# Patient Record
Sex: MALE | Race: WHITE | NOT HISPANIC OR LATINO | Employment: FULL TIME | URBAN - METROPOLITAN AREA
[De-identification: names, ages, dates, MRNs, and addresses within clinical notes are randomized per-mention and may not be internally consistent; named-entity substitution may affect disease eponyms.]

---

## 2023-03-12 DIAGNOSIS — E11.9 TYPE 2 DIABETES MELLITUS WITHOUT COMPLICATION, UNSPECIFIED WHETHER LONG TERM INSULIN USE (MULTI): ICD-10-CM

## 2023-03-12 RX ORDER — GLIMEPIRIDE 4 MG/1
1 TABLET ORAL DAILY
COMMUNITY
Start: 2020-02-27 | End: 2023-03-12 | Stop reason: SDUPTHER

## 2023-03-12 RX ORDER — GLIMEPIRIDE 4 MG/1
4 TABLET ORAL DAILY
Qty: 90 TABLET | Refills: 1 | Status: SHIPPED | OUTPATIENT
Start: 2023-03-12 | End: 2023-06-12 | Stop reason: ALTCHOICE

## 2023-04-23 DIAGNOSIS — I10 HYPERTENSION, UNSPECIFIED TYPE: ICD-10-CM

## 2023-04-23 DIAGNOSIS — Z00.00 HEALTH CARE MAINTENANCE: ICD-10-CM

## 2023-04-23 RX ORDER — MELOXICAM 15 MG/1
15 TABLET ORAL DAILY
COMMUNITY
End: 2023-04-23 | Stop reason: SDUPTHER

## 2023-04-23 RX ORDER — AMLODIPINE BESYLATE 5 MG/1
5 TABLET ORAL DAILY
Qty: 90 TABLET | Refills: 1 | Status: SHIPPED | OUTPATIENT
Start: 2023-04-23 | End: 2023-10-18 | Stop reason: SDUPTHER

## 2023-04-23 RX ORDER — LISINOPRIL AND HYDROCHLOROTHIAZIDE 12.5; 2 MG/1; MG/1
1 TABLET ORAL 2 TIMES DAILY
COMMUNITY
End: 2023-04-23 | Stop reason: SDUPTHER

## 2023-04-23 RX ORDER — AMLODIPINE BESYLATE 5 MG/1
5 TABLET ORAL DAILY
COMMUNITY
End: 2023-04-23 | Stop reason: SDUPTHER

## 2023-04-23 RX ORDER — LISINOPRIL AND HYDROCHLOROTHIAZIDE 12.5; 2 MG/1; MG/1
2 TABLET ORAL DAILY
Qty: 180 TABLET | Refills: 1 | Status: SHIPPED | OUTPATIENT
Start: 2023-04-23 | End: 2023-10-25 | Stop reason: SDUPTHER

## 2023-04-23 RX ORDER — MELOXICAM 15 MG/1
15 TABLET ORAL DAILY
Qty: 90 TABLET | Refills: 1 | Status: SHIPPED | OUTPATIENT
Start: 2023-04-23 | End: 2023-10-18 | Stop reason: SDUPTHER

## 2023-06-01 PROBLEM — I10 BENIGN SYSTOLIC HYPERTENSION: Status: ACTIVE | Noted: 2023-06-01

## 2023-06-01 PROBLEM — M16.12 ARTHRITIS OF LEFT HIP: Status: ACTIVE | Noted: 2023-06-01

## 2023-06-01 PROBLEM — E11.21 DIABETES MELLITUS WITH KIDNEY DISEASE (MULTI): Status: ACTIVE | Noted: 2023-06-01

## 2023-06-01 PROBLEM — N18.31 STAGE 3A CHRONIC KIDNEY DISEASE (MULTI): Status: ACTIVE | Noted: 2023-06-01

## 2023-06-01 PROBLEM — H35.30 MACULAR DEGENERATION: Status: ACTIVE | Noted: 2023-06-01

## 2023-06-01 PROBLEM — E78.5 HYPERLIPEMIA: Status: ACTIVE | Noted: 2023-06-01

## 2023-06-01 RX ORDER — KRILL/OM-3/DHA/EPA/PHOSPHO/AST 1000-170MG
CAPSULE ORAL
COMMUNITY
Start: 2020-02-27

## 2023-06-01 RX ORDER — ASPIRIN 81 MG/1
TABLET ORAL
COMMUNITY
Start: 2020-02-27

## 2023-06-01 RX ORDER — ROSUVASTATIN CALCIUM 40 MG/1
TABLET, COATED ORAL
COMMUNITY
End: 2023-08-07 | Stop reason: SDUPTHER

## 2023-06-01 RX ORDER — SITAGLIPTIN AND METFORMIN HYDROCHLORIDE 1000; 50 MG/1; MG/1
TABLET, FILM COATED ORAL
COMMUNITY
End: 2023-06-24 | Stop reason: SDUPTHER

## 2023-06-01 RX ORDER — BLOOD SUGAR DIAGNOSTIC
STRIP MISCELLANEOUS
COMMUNITY
Start: 2020-02-27

## 2023-06-01 RX ORDER — AMMONIUM LACTATE 12 G/100G
LOTION TOPICAL
COMMUNITY
Start: 2020-02-27 | End: 2024-02-18 | Stop reason: SDUPTHER

## 2023-06-01 RX ORDER — CHOLECALCIFEROL (VITAMIN D3) 25 MCG
TABLET ORAL
COMMUNITY
Start: 2020-02-27

## 2023-06-12 ENCOUNTER — OFFICE VISIT (OUTPATIENT)
Dept: PRIMARY CARE | Facility: CLINIC | Age: 73
End: 2023-06-12
Payer: MEDICARE

## 2023-06-12 VITALS
DIASTOLIC BLOOD PRESSURE: 75 MMHG | BODY MASS INDEX: 35.79 KG/M2 | SYSTOLIC BLOOD PRESSURE: 132 MMHG | WEIGHT: 228 LBS | HEART RATE: 58 BPM | HEIGHT: 67 IN

## 2023-06-12 DIAGNOSIS — E11.21 DIABETES MELLITUS WITH KIDNEY DISEASE (MULTI): ICD-10-CM

## 2023-06-12 DIAGNOSIS — G47.33 SLEEP APNEA, OBSTRUCTIVE: ICD-10-CM

## 2023-06-12 DIAGNOSIS — M25.512 ACUTE PAIN OF LEFT SHOULDER: ICD-10-CM

## 2023-06-12 DIAGNOSIS — N18.31 STAGE 3A CHRONIC KIDNEY DISEASE (MULTI): ICD-10-CM

## 2023-06-12 DIAGNOSIS — Z13.89 SCREENING FOR MULTIPLE CONDITIONS: ICD-10-CM

## 2023-06-12 DIAGNOSIS — Z00.00 ROUTINE GENERAL MEDICAL EXAMINATION AT HEALTH CARE FACILITY: Primary | ICD-10-CM

## 2023-06-12 DIAGNOSIS — Z12.5 PROSTATE CANCER SCREENING: ICD-10-CM

## 2023-06-12 DIAGNOSIS — E66.01 OBESITY, MORBID (MULTI): ICD-10-CM

## 2023-06-12 DIAGNOSIS — Z13.6 SCREENING FOR CARDIOVASCULAR CONDITION: ICD-10-CM

## 2023-06-12 DIAGNOSIS — I10 BENIGN SYSTOLIC HYPERTENSION: ICD-10-CM

## 2023-06-12 DIAGNOSIS — Z13.220 SCREENING FOR HYPERLIPIDEMIA: ICD-10-CM

## 2023-06-12 DIAGNOSIS — E11.9 TYPE 2 DIABETES MELLITUS WITHOUT COMPLICATION, UNSPECIFIED WHETHER LONG TERM INSULIN USE (MULTI): ICD-10-CM

## 2023-06-12 PROBLEM — E78.5 HYPERLIPIDEMIA WITH TARGET LDL LESS THAN 100: Status: RESOLVED | Noted: 2023-06-12 | Resolved: 2023-06-12

## 2023-06-12 PROBLEM — H35.3190 NONEXUDATIVE MACULAR DEGENERATION: Status: ACTIVE | Noted: 2017-03-10

## 2023-06-12 LAB
ALANINE AMINOTRANSFERASE (SGPT) (U/L) IN SER/PLAS: 33 U/L (ref 10–52)
ALBUMIN (G/DL) IN SER/PLAS: 4.3 G/DL (ref 3.4–5)
ALKALINE PHOSPHATASE (U/L) IN SER/PLAS: 66 U/L (ref 33–136)
ANION GAP IN SER/PLAS: 14 MMOL/L (ref 10–20)
ASPARTATE AMINOTRANSFERASE (SGOT) (U/L) IN SER/PLAS: 24 U/L (ref 9–39)
BILIRUBIN TOTAL (MG/DL) IN SER/PLAS: 0.6 MG/DL (ref 0–1.2)
CALCIUM (MG/DL) IN SER/PLAS: 10.5 MG/DL (ref 8.6–10.6)
CARBON DIOXIDE, TOTAL (MMOL/L) IN SER/PLAS: 27 MMOL/L (ref 21–32)
CHLORIDE (MMOL/L) IN SER/PLAS: 103 MMOL/L (ref 98–107)
CHOLESTEROL (MG/DL) IN SER/PLAS: 118 MG/DL (ref 0–199)
CHOLESTEROL IN HDL (MG/DL) IN SER/PLAS: 32.5 MG/DL
CHOLESTEROL/HDL RATIO: 3.6
CREATININE (MG/DL) IN SER/PLAS: 1.41 MG/DL (ref 0.5–1.3)
ERYTHROCYTE DISTRIBUTION WIDTH (RATIO) BY AUTOMATED COUNT: 13.2 % (ref 11.5–14.5)
ERYTHROCYTE MEAN CORPUSCULAR HEMOGLOBIN CONCENTRATION (G/DL) BY AUTOMATED: 31.4 G/DL (ref 32–36)
ERYTHROCYTE MEAN CORPUSCULAR VOLUME (FL) BY AUTOMATED COUNT: 94 FL (ref 80–100)
ERYTHROCYTES (10*6/UL) IN BLOOD BY AUTOMATED COUNT: 4.66 X10E12/L (ref 4.5–5.9)
GFR MALE: 53 ML/MIN/1.73M2
GLUCOSE (MG/DL) IN SER/PLAS: 60 MG/DL (ref 74–99)
HEMATOCRIT (%) IN BLOOD BY AUTOMATED COUNT: 43.6 % (ref 41–52)
HEMOGLOBIN (G/DL) IN BLOOD: 13.7 G/DL (ref 13.5–17.5)
LDL: 45 MG/DL (ref 0–99)
LEUKOCYTES (10*3/UL) IN BLOOD BY AUTOMATED COUNT: 5.8 X10E9/L (ref 4.4–11.3)
NON HDL CHOLESTEROL: 86 MG/DL
NRBC (PER 100 WBCS) BY AUTOMATED COUNT: 0 /100 WBC (ref 0–0)
PLATELETS (10*3/UL) IN BLOOD AUTOMATED COUNT: 143 X10E9/L (ref 150–450)
POC HEMOGLOBIN A1C: 6 % (ref 4.2–6.5)
POTASSIUM (MMOL/L) IN SER/PLAS: 4.1 MMOL/L (ref 3.5–5.3)
PROSTATE SPECIFIC ANTIGEN,SCREEN: 1.95 NG/ML (ref 0–4)
PROTEIN TOTAL: 6.8 G/DL (ref 6.4–8.2)
SODIUM (MMOL/L) IN SER/PLAS: 140 MMOL/L (ref 136–145)
THYROTROPIN (MIU/L) IN SER/PLAS BY DETECTION LIMIT <= 0.05 MIU/L: 3.28 MIU/L (ref 0.44–3.98)
TRIGLYCERIDE (MG/DL) IN SER/PLAS: 205 MG/DL (ref 0–149)
UREA NITROGEN (MG/DL) IN SER/PLAS: 26 MG/DL (ref 6–23)
VLDL: 41 MG/DL (ref 0–40)

## 2023-06-12 PROCEDURE — 1159F MED LIST DOCD IN RCRD: CPT | Performed by: FAMILY MEDICINE

## 2023-06-12 PROCEDURE — 80053 COMPREHEN METABOLIC PANEL: CPT

## 2023-06-12 PROCEDURE — G0439 PPPS, SUBSEQ VISIT: HCPCS | Performed by: FAMILY MEDICINE

## 2023-06-12 PROCEDURE — 1160F RVW MEDS BY RX/DR IN RCRD: CPT | Performed by: FAMILY MEDICINE

## 2023-06-12 PROCEDURE — 84443 ASSAY THYROID STIM HORMONE: CPT

## 2023-06-12 PROCEDURE — 80061 LIPID PANEL: CPT

## 2023-06-12 PROCEDURE — 1036F TOBACCO NON-USER: CPT | Performed by: FAMILY MEDICINE

## 2023-06-12 PROCEDURE — 99214 OFFICE O/P EST MOD 30 MIN: CPT | Performed by: FAMILY MEDICINE

## 2023-06-12 PROCEDURE — 83036 HEMOGLOBIN GLYCOSYLATED A1C: CPT | Performed by: FAMILY MEDICINE

## 2023-06-12 PROCEDURE — 85027 COMPLETE CBC AUTOMATED: CPT

## 2023-06-12 PROCEDURE — 1170F FXNL STATUS ASSESSED: CPT | Performed by: FAMILY MEDICINE

## 2023-06-12 PROCEDURE — 3075F SYST BP GE 130 - 139MM HG: CPT | Performed by: FAMILY MEDICINE

## 2023-06-12 PROCEDURE — G0447 BEHAVIOR COUNSEL OBESITY 15M: HCPCS | Performed by: FAMILY MEDICINE

## 2023-06-12 PROCEDURE — 3078F DIAST BP <80 MM HG: CPT | Performed by: FAMILY MEDICINE

## 2023-06-12 PROCEDURE — G0103 PSA SCREENING: HCPCS

## 2023-06-12 RX ORDER — GLIMEPIRIDE 4 MG/1
4 TABLET ORAL DAILY
Qty: 90 TABLET | Refills: 1 | Status: SHIPPED | OUTPATIENT
Start: 2023-06-12 | End: 2023-12-08 | Stop reason: SDUPTHER

## 2023-06-12 ASSESSMENT — ACTIVITIES OF DAILY LIVING (ADL)
BATHING: INDEPENDENT
MANAGING_FINANCES: INDEPENDENT
GROCERY_SHOPPING: INDEPENDENT
TAKING_MEDICATION: INDEPENDENT
DRESSING: INDEPENDENT
DOING_HOUSEWORK: INDEPENDENT

## 2023-06-12 ASSESSMENT — PATIENT HEALTH QUESTIONNAIRE - PHQ9
SUM OF ALL RESPONSES TO PHQ9 QUESTIONS 1 AND 2: 0
1. LITTLE INTEREST OR PLEASURE IN DOING THINGS: NOT AT ALL
2. FEELING DOWN, DEPRESSED OR HOPELESS: NOT AT ALL

## 2023-06-12 ASSESSMENT — ENCOUNTER SYMPTOMS
DEPRESSION: 0
OCCASIONAL FEELINGS OF UNSTEADINESS: 0
LOSS OF SENSATION IN FEET: 0

## 2023-06-12 NOTE — PROGRESS NOTES
Assessment and Plan:  Problem List Items Addressed This Visit       Benign systolic hypertension    Current Assessment & Plan     Patient's blood pressure is at goal of 130/85 or less. Condition is stable. Continue current medications and treatment plan.  I recommend that you exercise for 30-45 minutes 5 days a week.  I also recommend a balanced diet with fruits and vegetables every day, lean meats, and little fried foods. The DASH diet (you can find this online) is a good example of this.           Relevant Orders    CBC (Completed)    Comprehensive Metabolic Panel (Completed)    Diabetes mellitus with kidney disease (CMS/[facility])    Current Assessment & Plan     A1c controlled continue medication         Relevant Orders    TSH with reflex to Free T4 if abnormal (Completed)    Lipid Panel (Completed)    Referral to Ophthalmology    POCT glycosylated hemoglobin (Hb A1C) manually resulted (Completed)    Stage 3a chronic kidney disease    Current Assessment & Plan     Monitor kidney function avoid NSAIDs avoid other nephrotoxic agents         Sleep apnea, obstructive    Current Assessment & Plan     Compliant with CPAP         Obesity, morbid (CMS/[facility])    Current Assessment & Plan     patient is advised to lose weight by reducing calorie intake and increasing activity levels, especially aerobic exercise           Other Visit Diagnoses       Routine general medical examination at health care facility    -  Primary    Acute pain of left shoulder        Relevant Orders    Referral to Orthopaedic Surgery    XR shoulder left 2+ views    CBC (Completed)    Comprehensive Metabolic Panel (Completed)    Prostate cancer screening        Relevant Orders    CBC (Completed)    Comprehensive Metabolic Panel (Completed)    Prostate Specific Antigen, Screen (Completed)    Screening for multiple conditions        Screening for cardiovascular condition        Screening for hyperlipidemia              Will refer to orthopedics    HPI left  "shoulder pain arthroscopy 10 yrs ago.  Unable to lift left arm no specific injury but has history of rotator cuff surgeries in the past        ROS   Constitutional:  o weight loss. Negative for chills and fever.   HENT: Negative.     Respiratory: Negative.     Cardiovascular: Negative.    Gastrointestinal: Negative.  Negative for nausea and vomiting.   Endocrine: Negative.    Genitourinary: Negative.    Musculoskeletal: Shoulder pain  Skin: Negative.  Negative for rash.   Allergic/Immunologic: Negative.    Neurological: Negative.    Hematological: Negative.    Psychiatric/Behavioral: Negative.     All other systems reviewed and are negative.      Blood Pressure 132/75   Pulse 58   Height 1.702 m (5' 7\")   Weight 103 kg (228 lb)   Body Mass Index 35.71 kg/m²   Body mass index is 35.71 kg/m².  Physical Exam    ENT:      Head: Normocephalic and atraumatic.      Right Ear: Tympanic membrane normal.      Left Ear: Tympanic membrane normal.      Nose: Nose normal.      Mouth/Throat:      Mouth: Mucous membranes are moist.   Eyes:      Pupils: Pupils are equal, round, and reactive to light.   Cardiovascular:      Rate and Rhythm: Normal rate and regular rhythm.      Pulses: Normal pulses.      Heart sounds: Normal heart sounds.   Pulmonary:      Effort: Pulmonary effort is normal.      Breath sounds: Normal breath sounds.   Abdominal:      General: Abdomen is flat. Bowel sounds are normal.      Palpations: Abdomen is soft.   Musculoskeletal:         Decreased abduction external rotation left shoulder to 30 degrees.      Cervical back: Normal range of motion and neck supple.   Skin:     General: Skin is warm and dry.      Capillary Refill: Capillary refill takes less than 2 seconds.   Neurological:      General: No focal deficit present.      Mental Status: She is alert and oriented to person, place, and time.   Psychiatric:         Mood and Affect: Mood normal.       Active Problem List  Patient Active Problem List "   Diagnosis    Arthritis of left hip    Benign systolic hypertension    Diabetes mellitus with kidney disease (CMS/[facility])    Hyperlipemia    Macular degeneration    Stage 3a chronic kidney disease    Nonexudative macular degeneration    Obesity, unspecified    Sleep apnea, obstructive    Obesity, morbid (CMS/[facility])       Comprehensive Medical/Surgical/Social/Family History  Past Medical History:   Diagnosis Date    Arthritis 12/01/2014    Diabetes mellitus (CMS/[facility]) 01/01/2009    Hypertension 06/12/2023    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     Past Surgical History:   Procedure Laterality Date    JOINT REPLACEMENT  6/2022    OTHER SURGICAL HISTORY  02/27/2020    Hip surgery    OTHER SURGICAL HISTORY  02/27/2020    Shoulder surgery    OTHER SURGICAL HISTORY  02/27/2020    Carpal tunnel surgery    TONSILLECTOMY  1957     Social History     Social History Narrative    Not on file       Allergies and Medications  Penicillins  Current Outpatient Medications   Medication Instructions    amLODIPine (NORVASC) 5 mg, oral, Daily    ammonium lactate (Lac-Hydrin) 12 % lotion APPLY  AND RUB  IN A THIN FILM TO AFFECTED AREAS TWICE DAILY.(AM AND PM).    aspirin 81 mg EC tablet TAKE 1 TABLET DAILY AS DIRECTED.    cholecalciferol (Vitamin D-3) 25 MCG (1000 UT) tablet TAKE 1 TABLET DAILY.    glimepiride (AMARYL) 4 mg, oral, Daily    Janumet 50-1,000 mg tablet take one tablet by mouth two times a day    krill-om-3-dha-epa-phospho-ast (krill oil) 1,929-657-82-80 mg capsule 3 tablets by mouth 2 times daily    lisinopriL-hydrochlorothiazide 20-12.5 mg tablet 2 tablets, oral, Daily    meloxicam (MOBIC) 15 mg, oral, Daily    OneTouch Ultra Test strip USE 1 STRIP Daily    rosuvastatin (Crestor) 40 mg tablet take one tablet by mouth at bedtime   Subjective   Reason for Visit: Jordon Waddell is an 72 y.o. male here for a Medicare Wellness visit.     Past Medical, Surgical, and Family History reviewed and updated in chart.    Reviewed all medications by  "prescribing practitioner or clinical pharmacist (such as prescriptions, OTCs, herbal therapies and supplements) and documented in the medical record.    HPI    Patient Care Team:  Tae Colby MD as PCP - General  Maggie Whipple MD as PCP - [employer] PCP     Review of Systems    Objective   Vitals:  Blood Pressure 132/75   Pulse 58   Height 1.702 m (5' 7\")   Weight 103 kg (228 lb)   Body Mass Index 35.71 kg/m²       Physical Exam    Assessment/Plan   Problem List Items Addressed This Visit       Benign systolic hypertension    Current Assessment & Plan     Patient's blood pressure is at goal of 130/85 or less. Condition is stable. Continue current medications and treatment plan.  I recommend that you exercise for 30-45 minutes 5 days a week.  I also recommend a balanced diet with fruits and vegetables every day, lean meats, and little fried foods. The DASH diet (you can find this online) is a good example of this.           Relevant Orders    CBC (Completed)    Comprehensive Metabolic Panel (Completed)    Diabetes mellitus with kidney disease (CMS/[facility])    Current Assessment & Plan     A1c controlled continue medication         Relevant Orders    TSH with reflex to Free T4 if abnormal (Completed)    Lipid Panel (Completed)    Referral to Ophthalmology    POCT glycosylated hemoglobin (Hb A1C) manually resulted (Completed)    Stage 3a chronic kidney disease    Current Assessment & Plan     Monitor kidney function avoid NSAIDs avoid other nephrotoxic agents         Sleep apnea, obstructive    Current Assessment & Plan     Compliant with CPAP         Obesity, morbid (CMS/[facility])    Current Assessment & Plan     patient is advised to lose weight by reducing calorie intake and increasing activity levels, especially aerobic exercise           Other Visit Diagnoses       Routine general medical examination at health care facility    -  Primary    Acute pain of left shoulder        Relevant Orders    " "Referral to Orthopaedic Surgery    XR shoulder left 2+ views    CBC (Completed)    Comprehensive Metabolic Panel (Completed)    Prostate cancer screening        Relevant Orders    CBC (Completed)    Comprehensive Metabolic Panel (Completed)    Prostate Specific Antigen, Screen (Completed)    Screening for multiple conditions        Screening for cardiovascular condition        Screening for hyperlipidemia             spent 15 minutes obtaining and discussing depression screening using PHQ-2 questions with results documented in chart.”  (If screen positive: \"The screen indicated potential depression and PHQ-9 was obtained with treatment and referral plan discussed  Watch what you eat and include more vegetables on your plate.  Portion control and exercise will help in loosing weight .   It is recommended to get 150 mins of brisk exercise in a week . 150 mins of exercise per week will help in maintaining your current weight, if you are already working out . Exercise should make your heart rate go up.   Calorie deficit ( spending more calories than eating ) will result in weight loss    A deficit of 500 calories per day in 7 days would results in 1lbs weight loss per week., Aim for 1-2 lbs weight loss per month.   You can reduce the calorie intake by 250 calories and spend 250 calories by working out which would give you a total deficit of 500 calories     No sugary/ sweetened beverages , portion control , dedicated physical activity atleast 5 times a week advised .          "

## 2023-06-13 ENCOUNTER — TELEPHONE (OUTPATIENT)
Dept: PRIMARY CARE | Facility: CLINIC | Age: 73
End: 2023-06-13
Payer: MEDICARE

## 2023-06-13 NOTE — TELEPHONE ENCOUNTER
----- Message from Tae Colby MD sent at 6/12/2023 11:46 PM EDT -----  Please call the patient regarding his abnormal result.  Kidney function slightly worse. Check if patient is taking advil or over the counter pain meds.   Check UA c and s. Repeat Renal function in 2 weeks.   Blood sugar low check BG at home 1-2 times day call if number below 70/

## 2023-06-18 PROBLEM — E66.01 OBESITY, MORBID (MULTI): Status: ACTIVE | Noted: 2023-06-18

## 2023-06-18 ASSESSMENT — COLUMBIA-SUICIDE SEVERITY RATING SCALE - C-SSRS
2. HAVE YOU ACTUALLY HAD ANY THOUGHTS OF KILLING YOURSELF?: NO
1. IN THE PAST MONTH, HAVE YOU WISHED YOU WERE DEAD OR WISHED YOU COULD GO TO SLEEP AND NOT WAKE UP?: NO

## 2023-06-18 NOTE — ASSESSMENT & PLAN NOTE
Patient's blood pressure is at goal of 130/85 or less. Condition is stable. Continue current medications and treatment plan.  I recommend that you exercise for 30-45 minutes 5 days a week.  I also recommend a balanced diet with fruits and vegetables every day, lean meats, and little fried foods. The DASH diet (you can find this online) is a good example of this.

## 2023-06-22 ENCOUNTER — TELEPHONE (OUTPATIENT)
Dept: PRIMARY CARE | Facility: CLINIC | Age: 73
End: 2023-06-22
Payer: MEDICARE

## 2023-06-24 DIAGNOSIS — E11.21 DIABETES MELLITUS WITH KIDNEY DISEASE (MULTI): ICD-10-CM

## 2023-06-25 RX ORDER — SITAGLIPTIN AND METFORMIN HYDROCHLORIDE 1000; 50 MG/1; MG/1
1 TABLET, FILM COATED ORAL 2 TIMES DAILY
Qty: 180 TABLET | Refills: 1 | Status: SHIPPED | OUTPATIENT
Start: 2023-06-25 | End: 2024-01-02 | Stop reason: SDUPTHER

## 2023-07-12 ENCOUNTER — CLINICAL SUPPORT (OUTPATIENT)
Dept: PRIMARY CARE | Facility: CLINIC | Age: 73
End: 2023-07-12
Payer: MEDICARE

## 2023-07-12 DIAGNOSIS — N28.9 ABNORMAL KIDNEY FUNCTION: ICD-10-CM

## 2023-07-12 LAB
ALBUMIN (G/DL) IN SER/PLAS: 4.3 G/DL (ref 3.4–5)
ANION GAP IN SER/PLAS: 16 MMOL/L (ref 10–20)
APPEARANCE, URINE: CLEAR
BACTERIA, URINE: ABNORMAL /HPF
BILIRUBIN, URINE: NEGATIVE
BLOOD, URINE: NEGATIVE
CALCIUM (MG/DL) IN SER/PLAS: 9.5 MG/DL (ref 8.6–10.6)
CARBON DIOXIDE, TOTAL (MMOL/L) IN SER/PLAS: 24 MMOL/L (ref 21–32)
CHLORIDE (MMOL/L) IN SER/PLAS: 105 MMOL/L (ref 98–107)
COLOR, URINE: ABNORMAL
CREATININE (MG/DL) IN SER/PLAS: 1.21 MG/DL (ref 0.5–1.3)
GFR MALE: 64 ML/MIN/1.73M2
GLUCOSE (MG/DL) IN SER/PLAS: 115 MG/DL (ref 74–99)
GLUCOSE, URINE: NEGATIVE MG/DL
KETONES, URINE: NEGATIVE MG/DL
LEUKOCYTE ESTERASE, URINE: NEGATIVE
NITRITE, URINE: NEGATIVE
PH, URINE: 5 (ref 5–8)
PHOSPHATE (MG/DL) IN SER/PLAS: 2.7 MG/DL (ref 2.5–4.9)
POTASSIUM (MMOL/L) IN SER/PLAS: 4 MMOL/L (ref 3.5–5.3)
PROTEIN, URINE: ABNORMAL MG/DL
RBC, URINE: 1 /HPF (ref 0–5)
SODIUM (MMOL/L) IN SER/PLAS: 141 MMOL/L (ref 136–145)
SPECIFIC GRAVITY, URINE: 1.01 (ref 1–1.03)
UREA NITROGEN (MG/DL) IN SER/PLAS: 23 MG/DL (ref 6–23)
UROBILINOGEN, URINE: <2 MG/DL (ref 0–1.9)
WBC, URINE: <1 /HPF (ref 0–5)

## 2023-07-12 PROCEDURE — 80069 RENAL FUNCTION PANEL: CPT

## 2023-07-12 PROCEDURE — 81001 URINALYSIS AUTO W/SCOPE: CPT

## 2023-07-18 ENCOUNTER — TELEPHONE (OUTPATIENT)
Dept: PRIMARY CARE | Facility: CLINIC | Age: 73
End: 2023-07-18
Payer: MEDICARE

## 2023-08-07 DIAGNOSIS — E78.5 HYPERLIPIDEMIA, UNSPECIFIED HYPERLIPIDEMIA TYPE: ICD-10-CM

## 2023-08-07 RX ORDER — ROSUVASTATIN CALCIUM 40 MG/1
40 TABLET, COATED ORAL NIGHTLY
Qty: 90 TABLET | Refills: 1 | Status: SHIPPED | OUTPATIENT
Start: 2023-08-07 | End: 2024-02-11 | Stop reason: SDUPTHER

## 2023-10-18 DIAGNOSIS — I10 HYPERTENSION, UNSPECIFIED TYPE: ICD-10-CM

## 2023-10-18 DIAGNOSIS — Z00.00 HEALTH CARE MAINTENANCE: ICD-10-CM

## 2023-10-18 RX ORDER — AMLODIPINE BESYLATE 5 MG/1
5 TABLET ORAL DAILY
Qty: 90 TABLET | Refills: 1 | Status: SHIPPED | OUTPATIENT
Start: 2023-10-18 | End: 2024-04-14 | Stop reason: SDUPTHER

## 2023-10-18 RX ORDER — MELOXICAM 15 MG/1
15 TABLET ORAL DAILY
Qty: 90 TABLET | Refills: 1 | Status: SHIPPED | OUTPATIENT
Start: 2023-10-18 | End: 2024-04-14 | Stop reason: SDUPTHER

## 2023-10-19 ENCOUNTER — APPOINTMENT (OUTPATIENT)
Dept: OPHTHALMOLOGY | Facility: CLINIC | Age: 73
End: 2023-10-19
Payer: MEDICARE

## 2023-10-25 DIAGNOSIS — I10 HYPERTENSION, UNSPECIFIED TYPE: ICD-10-CM

## 2023-10-25 RX ORDER — LISINOPRIL AND HYDROCHLOROTHIAZIDE 12.5; 2 MG/1; MG/1
2 TABLET ORAL DAILY
Qty: 180 TABLET | Refills: 1 | Status: SHIPPED | OUTPATIENT
Start: 2023-10-25 | End: 2024-04-14 | Stop reason: SDUPTHER

## 2023-11-01 ENCOUNTER — APPOINTMENT (OUTPATIENT)
Dept: OPHTHALMOLOGY | Facility: CLINIC | Age: 73
End: 2023-11-01
Payer: MEDICARE

## 2023-11-16 ENCOUNTER — OFFICE VISIT (OUTPATIENT)
Dept: ORTHOPEDIC SURGERY | Facility: HOSPITAL | Age: 73
End: 2023-11-16
Payer: MEDICARE

## 2023-11-16 DIAGNOSIS — M19.012 ARTHRITIS OF LEFT GLENOHUMERAL JOINT: Primary | ICD-10-CM

## 2023-11-16 PROCEDURE — 99214 OFFICE O/P EST MOD 30 MIN: CPT | Performed by: ORTHOPAEDIC SURGERY

## 2023-11-16 PROCEDURE — 1160F RVW MEDS BY RX/DR IN RCRD: CPT | Performed by: ORTHOPAEDIC SURGERY

## 2023-11-16 PROCEDURE — 1159F MED LIST DOCD IN RCRD: CPT | Performed by: ORTHOPAEDIC SURGERY

## 2023-11-16 PROCEDURE — 1126F AMNT PAIN NOTED NONE PRSNT: CPT | Performed by: ORTHOPAEDIC SURGERY

## 2023-11-16 PROCEDURE — 1036F TOBACCO NON-USER: CPT | Performed by: ORTHOPAEDIC SURGERY

## 2023-11-16 NOTE — PROGRESS NOTES
Subjective    Patient ID: Jordon Waddell is a 72 y.o. male.    Chief Complaint: No chief complaint on file.     Last Surgery: No surgery found  Last Surgery Date: No surgery found    Jordon is a 72 year-old right-hand-dominant male comes to see me for left shoulder pain for last 6 to 8 months. Does not remember an injury. 60% of normal shoulder. 100% on the right side. No therapy. 8 out of 10 pain at its worst 6 out of 10 on average. It wakes him up at night. No radiation of symptoms. Past medical history, surgical history, social history, family history were all reviewed and are as per the [facility] patient health history questionnaire form that I signed and scanned into the chart today. Review of systems for all 14 systems is negative except for the above noted findings in the history of present illness former smoker not currently smoking.     11/16/23  Jordon returns to the clinic today for a repeat follow up visit regarding his left shoulder pain. He is accompanied by his wife today.     He is here today with complaints of returned shoulder pain. His last injection only lasted him a few days and he would like to discuss his other options today.     He has had a knee replacement done with Dr. Suresh.         Objective   Patient is a well-developed well-nourished gentleman in no acute distress. breathes with normal chest rises. pupils are round and symmetric today. Awake alert and oriented x3. Examination of the right shoulder today reveals the skin to be intact. There is no sign any atrophy lesions or abrasions. There is no pain to palpation of the bony prominences. Cervical lymphadenopathy examined, and this was negative.     Patient had 5 out of 5 wrist flexors extension thumb extension bilaterally. Sensation was intact to light touch to median ulnar radial axillary and musculocutaneous nerves bilaterally. Positive radial pulse bilaterally. Provocative maneuvers on the right side today were negative.  Range of  motion of the right shoulder revealed 0-170° of forward elevation. 0-60° of external rotation. And internal rotation up to T 12.  Examination of the left shoulder today reveals skin is intact. 150 degrees not passively correctable 40 degrees external Tatian not passively correctable internally rotates to lower lumbar spine. Negative modified belly press test. Pain with external Tatian 90 degrees abduction 0 degrees of abduction. Some crepitus.       Image Results:  X-rays from June 2023 show moderate to severe osteoarthritis with slight medialization of the joint line     Assessment/Plan   Encounter Diagnoses:  No diagnosis found.  Patient patient with end-stage arthritis of the left shoulder    At this time we had a long discussion about the various options for shoulder arthritis.     1. Do nothing continue activity modifications   2. Consider cortisone injections into the glenohumeral joint. This would give pain relief that is temporary. this would not stop the progression of arthritis. For some patient's, this injection can last not at all, for 2 weeks, 4 weeks, 3 months or longer. The risk of the injection is fairly minimal but does include included a very small chance of infection.   3. Option is a total shoulder replacement. This will give the patient a more permanent solution to pain relief. It would also improve function. There is no rush to this procedure it is an elective procedure.    Jordon has severe arthritis in their shoulder. They have failed conservative management for over 6 months with injections, therapy and home exercises. They cannot continue living with their shoulders secondary to pain and disability. They have severe erosive, destructive changes within their shoulder joint limitation of motion that do not allow them to get above their shoulder level or reach behind their back. Because of the erosive and destructive changes in their shoulder joint as seen on CT scan and plain films, in  addition to their severe limitation of range of motion we have recommended an anatomic shoulder replacement which will help with theirpain as well as improve their function.     The risks of surgery are infection, dislocation, nerve injury, blood loss. The benefits are pain relief and restoration of function. The patient verbalized an understanding of the risks, benefits, alternatives, and the expected outcomes. We gave the patient a handout today on anatomic shoulder replacements.      As his last injection did not help for more than a few days, we discussed surgery more in depth today. We will schedule his pre operative CT scan to be sure we are not missing any underlying pathology and pre-surgical planning. We will also get him set up to see Sugey for pre-op paperwork.     PLAN for LEFT anatomic shoulder replacement pending on CT results.   No orders of the defined types were placed in this encounter.    Follow up will be scheduled appropriately.     Scribe Attestation  By signing my name below, Rita TOSCANO Scribe   attest that this documentation has been prepared under the direction and in the presence of Mike Salazar MD.

## 2023-11-30 ENCOUNTER — HOSPITAL ENCOUNTER (OUTPATIENT)
Dept: RADIOLOGY | Facility: HOSPITAL | Age: 73
Discharge: HOME | End: 2023-11-30
Payer: MEDICARE

## 2023-11-30 DIAGNOSIS — M19.012 ARTHRITIS OF LEFT GLENOHUMERAL JOINT: ICD-10-CM

## 2023-11-30 PROCEDURE — 73200 CT UPPER EXTREMITY W/O DYE: CPT | Mod: LT

## 2023-11-30 PROCEDURE — 73200 CT UPPER EXTREMITY W/O DYE: CPT | Mod: LEFT SIDE | Performed by: STUDENT IN AN ORGANIZED HEALTH CARE EDUCATION/TRAINING PROGRAM

## 2023-11-30 PROCEDURE — 76377 3D RENDER W/INTRP POSTPROCES: CPT | Mod: LEFT SIDE | Performed by: STUDENT IN AN ORGANIZED HEALTH CARE EDUCATION/TRAINING PROGRAM

## 2023-12-05 ENCOUNTER — OFFICE VISIT (OUTPATIENT)
Dept: ORTHOPEDIC SURGERY | Facility: HOSPITAL | Age: 73
End: 2023-12-05
Payer: MEDICARE

## 2023-12-05 VITALS — HEIGHT: 67 IN | WEIGHT: 220 LBS | BODY MASS INDEX: 34.53 KG/M2

## 2023-12-05 DIAGNOSIS — Z01.818 PRE-OP EVALUATION: ICD-10-CM

## 2023-12-05 DIAGNOSIS — M19.012 ARTHRITIS OF LEFT SHOULDER REGION: ICD-10-CM

## 2023-12-05 PROCEDURE — 1159F MED LIST DOCD IN RCRD: CPT | Performed by: NURSE PRACTITIONER

## 2023-12-05 PROCEDURE — 3044F HG A1C LEVEL LT 7.0%: CPT | Performed by: NURSE PRACTITIONER

## 2023-12-05 PROCEDURE — 3048F LDL-C <100 MG/DL: CPT | Performed by: NURSE PRACTITIONER

## 2023-12-05 PROCEDURE — L3670 SO ACRO/CLAV CAN WEB PRE OTS: HCPCS | Performed by: NURSE PRACTITIONER

## 2023-12-05 PROCEDURE — 1157F ADVNC CARE PLAN IN RCRD: CPT | Performed by: NURSE PRACTITIONER

## 2023-12-05 PROCEDURE — 99214 OFFICE O/P EST MOD 30 MIN: CPT | Performed by: NURSE PRACTITIONER

## 2023-12-05 PROCEDURE — 1036F TOBACCO NON-USER: CPT | Performed by: NURSE PRACTITIONER

## 2023-12-05 PROCEDURE — 1160F RVW MEDS BY RX/DR IN RCRD: CPT | Performed by: NURSE PRACTITIONER

## 2023-12-05 PROCEDURE — 1126F AMNT PAIN NOTED NONE PRSNT: CPT | Performed by: NURSE PRACTITIONER

## 2023-12-05 ASSESSMENT — PAIN - FUNCTIONAL ASSESSMENT: PAIN_FUNCTIONAL_ASSESSMENT: 0-10

## 2023-12-05 ASSESSMENT — PAIN DESCRIPTION - DESCRIPTORS: DESCRIPTORS: ACHING;SHARP

## 2023-12-05 ASSESSMENT — PAIN SCALES - GENERAL: PAINLEVEL_OUTOF10: 7

## 2023-12-05 NOTE — PROGRESS NOTES
Provider Impression/Assessment:  Jordon Waddell has end-stage arthritis of left shoulder.      PLAN for LEFT anatomic shoulder replacement pending on CT results.      Patient Discussion/Plan:  Patient has failed conservative management of injections, therapy and anti-inflammatories. The risks benefits and alternatives of an anatomic shoulder replacement were discussed at length with Dr Salazar previously. This was reviewed today. The risks of surgery are infection, dislocation, nerve injury, blood loss. The benefits are pain relief and restoration of function and activities of daily life. The patient verbalize an understanding of the risks benefits alternatives and the expected outcomes and wishes to proceed with elective surgery. The rehabilitation after surgery was discussed at length. It would be 4 weeks in a sling with activities as tolerated at the waist level. Following this they will get into physical therapy for stretching and range of motion exercises. We anticipate they will make a near full recovery around 3-4 months, but with continued improvement up to a year after surgery. We also discussed the hospital course. Usually patients stay one night but sometimes they are able to leave same day. We will plan on Jordon Waddell leaving same day.     We have scheduled them for left total anatomic shoulder replacement on 2/21/24 to take place at [facility]. They were fitted for the post-operative sling today. They will need to be seen and cleared by the anesthesia team prior to surgery. Patient has obtained a CT scan prior to surgery. No further imaging is needed.      Plan will be for left anatomic total shoulder replacement pending the outcomes of the CT scan. We gave the patient a handout today on anatomic shoulder replacement. Patient works at [employer] and will plan on 12 weeks off following surgery due to the nature of his position. Paperwork was submitted to our office today for further work up.      All of their questions were answered today to their satisfaction and they are in agreement with the above plan. They know how to reach the office if any additional questions arise prior to surgery date. .      Patient was discussed with Dr Salazar and imaging reviewed. He is in full agreement of the surgical plan.      Should you have any questions or concerns after your visit today, please do not hesitate to call the office at [phone]. I am honored to be a provider on your health care team and remain dedicated to helping you achieve your healthcare goals    -------------------------------------------------------------------------------------------------  CHIEF COMPLAINT:  PRE-OPERATIVE VISIT  LEFT SHOULDER    History of Present Illness:  Rafaela is a 72 year-old right-hand-dominant male comes to see me for left shoulder pain for last 6 to 8 months. Does not remember an injury. 60% of normal shoulder. 100% on the right side. No therapy. 8 out of 10 pain at its worst 6 out of 10 on average. It wakes him up at night. No radiation of symptoms. Past medical history, surgical history, social history, family history were all reviewed and are as per the [facility] patient health history questionnaire form that I signed and scanned into the chart today. Review of systems for all 14 systems is negative except for the above noted findings in the history of present illness former smoker not currently smoking.      11/16/23  Rafaela returns to the clinic today for a repeat follow up visit regarding his left shoulder pain. He is accompanied by his wife today. He is here today with complaints of returned shoulder pain. His last injection only lasted him a few days and he would like to discuss his other options today. He has had a knee replacement done with Dr. Suresh.     12/5/23  RAFAELA RIOS returns to clinic for a preoperative planning visit for a left total shoulder replacement. He denies any new injury or trauma to his  left shoulder since his last visit. He is still struggling to sleep at night due to his pain. Status post bilateral arthroscopies at [facility] 'years ago'. 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 Has Ly[employer] chair at home already for post operative sleeping.     PCN - rash  DM - last A1C was <6  BMI = 34    Review of Systems:   Review of systems for all 14 systems is negative for complaint except for the above noted findings in the history of present illness.    Family, social, and medical histories are obtained and reviewed.    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       Past Surgical History:   Procedure Laterality Date    JOINT REPLACEMENT  2022    OTHER SURGICAL HISTORY  2020    Hip surgery    OTHER SURGICAL HISTORY  2020    Shoulder surgery    OTHER SURGICAL HISTORY  2020    Carpal tunnel surgery    TONSILLECTOMY         Social History     Socioeconomic History    Marital status:      Spouse name: Not on file    Number of children: Not on file    Years of education: Not on file    Highest education level: Not on file   Occupational History    Not on file   Tobacco Use    Smoking status: Former     Packs/day: 1.00     Years: 5.00     Additional pack years: 0.00     Total pack years: 5.00     Types: Cigarettes     Start date: 1980     Quit date: 1985     Years since quittin.9    Smokeless tobacco: Never   Vaping Use    Vaping Use: Never used   Substance and Sexual Activity    Alcohol use: Yes     Alcohol/week: 1.0 standard drink of alcohol     Types: 1 Cans of beer per week     Drug use: Never    Sexual activity: Not Currently     Partners: Female   Other Topics Concern    Not on file   Social History Narrative    Not on file     Social Determinants of Health     Financial Resource Strain: Not on file   Food Insecurity: Not on file   Transportation Needs: Not on file   Physical Activity: Not on file   Stress: Not on file   Social Connections: Not on file   Intimate Partner Violence: Not on file   Housing Stability: Not on file     Diagnoses/Problems:  Left shoulder arthritis    Physical Examination:  Jordon Waddell is a well-developed well-nourished male in no acute distress. Breathes with normal chest rises. Eye exam reveals round pupils. Awake alert and oriented x3.     Examination of right shoulder reveals range of motion 0-170° of forward elevation. 0-60° of external rotation. Internal rotation was to T12.     Examination of left shoulder reveals skin is intact. No edema. No ecchymosis. No erythema. Active forward elevation to 90°, passively to 150°. External rotation to 10°. Internally rotates to lower lumbar spine. No pain over acromioclavicular joint. Neurovascular intact distally. Jobes test is 4+ out of 5.      Results/Imaging:  Independent review of the imaging today of left shows no signs of any fracture or dislocation. X-rays from June 2023 show moderate to severe osteoarthritis with slight medialization of the joint line. These were personally reviewed with Dr Salazar previously. I personally spent time viewing digital images of the radiology testing with the patient. I explained the results to the patient, along with suggested explanation for follow up recommendations based on testing and clinical results.     Medical Equipment:  Patient was prescribed a shoulder sling for postoperative care. The patient will have weakness, instability and/or deformity of their left arm which requires stabilization from this orthosis to improve their function after surgery.     Verbal and  written instructions for the use, wear schedule, cleaning and application of this item were given. Patient was instructed that should the brace result in increased pain, decreased sensation, increased swelling, or an overall worsening of their medical condition, to please contact our office immediately at [phone].     Orthotic management and training was provided for skin care, modifications due to healing tissues, edema changes, interruption in skin integrity, and safety precautions with the orthosis.        *While intending to generate a timely document that accurately reflects the content of the visit, no guarantee can be provided that every grammatical or spelling mistake has been or will be identified or corrected. Thank you for your understanding.

## 2023-12-08 DIAGNOSIS — E11.9 TYPE 2 DIABETES MELLITUS WITHOUT COMPLICATION, UNSPECIFIED WHETHER LONG TERM INSULIN USE (MULTI): ICD-10-CM

## 2023-12-08 RX ORDER — GLIMEPIRIDE 4 MG/1
4 TABLET ORAL DAILY
Qty: 90 TABLET | Refills: 1 | Status: SHIPPED | OUTPATIENT
Start: 2023-12-08 | End: 2024-06-03 | Stop reason: SDUPTHER

## 2023-12-18 ENCOUNTER — OFFICE VISIT (OUTPATIENT)
Dept: PRIMARY CARE | Facility: CLINIC | Age: 73
End: 2023-12-18
Payer: MEDICARE

## 2023-12-18 VITALS
SYSTOLIC BLOOD PRESSURE: 134 MMHG | BODY MASS INDEX: 34.84 KG/M2 | HEIGHT: 67 IN | HEART RATE: 71 BPM | WEIGHT: 222 LBS | DIASTOLIC BLOOD PRESSURE: 82 MMHG

## 2023-12-18 DIAGNOSIS — E78.00 PURE HYPERCHOLESTEROLEMIA: ICD-10-CM

## 2023-12-18 DIAGNOSIS — Z23 NEED FOR INFLUENZA VACCINATION: ICD-10-CM

## 2023-12-18 DIAGNOSIS — E11.21 DIABETES MELLITUS WITH KIDNEY DISEASE (MULTI): ICD-10-CM

## 2023-12-18 DIAGNOSIS — Z00.00 ROUTINE GENERAL MEDICAL EXAMINATION AT A HEALTH CARE FACILITY: Primary | ICD-10-CM

## 2023-12-18 DIAGNOSIS — I10 BENIGN SYSTOLIC HYPERTENSION: ICD-10-CM

## 2023-12-18 PROBLEM — S89.92XA LEFT KNEE INJURY: Status: RESOLVED | Noted: 2023-12-18 | Resolved: 2023-12-18

## 2023-12-18 PROBLEM — M19.90 CHRONIC OSTEOARTHRITIS: Status: ACTIVE | Noted: 2023-12-18

## 2023-12-18 PROBLEM — J22 ACUTE RESPIRATORY INFECTION: Status: RESOLVED | Noted: 2023-12-18 | Resolved: 2023-12-18

## 2023-12-18 PROBLEM — R50.9 FEVER: Status: RESOLVED | Noted: 2023-12-18 | Resolved: 2023-12-18

## 2023-12-18 PROBLEM — S76.112S: Status: RESOLVED | Noted: 2023-12-18 | Resolved: 2023-12-18

## 2023-12-18 LAB
ALBUMIN SERPL BCP-MCNC: 4.4 G/DL (ref 3.4–5)
ALP SERPL-CCNC: 67 U/L (ref 33–136)
ALT SERPL W P-5'-P-CCNC: 21 U/L (ref 10–52)
ANION GAP SERPL CALC-SCNC: 13 MMOL/L (ref 10–20)
AST SERPL W P-5'-P-CCNC: 19 U/L (ref 9–39)
BILIRUB SERPL-MCNC: 0.4 MG/DL (ref 0–1.2)
BUN SERPL-MCNC: 22 MG/DL (ref 6–23)
CALCIUM SERPL-MCNC: 10.1 MG/DL (ref 8.6–10.6)
CHLORIDE SERPL-SCNC: 105 MMOL/L (ref 98–107)
CHOLEST SERPL-MCNC: 116 MG/DL (ref 0–199)
CHOLESTEROL/HDL RATIO: 2.9
CO2 SERPL-SCNC: 26 MMOL/L (ref 21–32)
CREAT SERPL-MCNC: 1.18 MG/DL (ref 0.5–1.3)
GFR SERPL CREATININE-BSD FRML MDRD: 66 ML/MIN/1.73M*2
GLUCOSE SERPL-MCNC: 163 MG/DL (ref 74–99)
HDLC SERPL-MCNC: 40 MG/DL
LDLC SERPL CALC-MCNC: 56 MG/DL
NON HDL CHOLESTEROL: 76 MG/DL (ref 0–149)
POC HEMOGLOBIN A1C: 6 % (ref 4.2–6.5)
POTASSIUM SERPL-SCNC: 4.1 MMOL/L (ref 3.5–5.3)
PROT SERPL-MCNC: 6.6 G/DL (ref 6.4–8.2)
SODIUM SERPL-SCNC: 140 MMOL/L (ref 136–145)
TRIGL SERPL-MCNC: 100 MG/DL (ref 0–149)
VLDL: 20 MG/DL (ref 0–40)

## 2023-12-18 PROCEDURE — 1126F AMNT PAIN NOTED NONE PRSNT: CPT | Performed by: FAMILY MEDICINE

## 2023-12-18 PROCEDURE — 1036F TOBACCO NON-USER: CPT | Performed by: FAMILY MEDICINE

## 2023-12-18 PROCEDURE — 83036 HEMOGLOBIN GLYCOSYLATED A1C: CPT | Performed by: FAMILY MEDICINE

## 2023-12-18 PROCEDURE — 1159F MED LIST DOCD IN RCRD: CPT | Performed by: FAMILY MEDICINE

## 2023-12-18 PROCEDURE — G0008 ADMIN INFLUENZA VIRUS VAC: HCPCS | Performed by: FAMILY MEDICINE

## 2023-12-18 PROCEDURE — 99397 PER PM REEVAL EST PAT 65+ YR: CPT | Performed by: FAMILY MEDICINE

## 2023-12-18 PROCEDURE — 90686 IIV4 VACC NO PRSV 0.5 ML IM: CPT | Performed by: FAMILY MEDICINE

## 2023-12-18 PROCEDURE — 36415 COLL VENOUS BLD VENIPUNCTURE: CPT

## 2023-12-18 PROCEDURE — 99214 OFFICE O/P EST MOD 30 MIN: CPT | Performed by: FAMILY MEDICINE

## 2023-12-18 PROCEDURE — 3079F DIAST BP 80-89 MM HG: CPT | Performed by: FAMILY MEDICINE

## 2023-12-18 PROCEDURE — 3048F LDL-C <100 MG/DL: CPT | Performed by: FAMILY MEDICINE

## 2023-12-18 PROCEDURE — 3075F SYST BP GE 130 - 139MM HG: CPT | Performed by: FAMILY MEDICINE

## 2023-12-18 PROCEDURE — 80061 LIPID PANEL: CPT

## 2023-12-18 PROCEDURE — 80053 COMPREHEN METABOLIC PANEL: CPT

## 2023-12-18 PROCEDURE — 1160F RVW MEDS BY RX/DR IN RCRD: CPT | Performed by: FAMILY MEDICINE

## 2023-12-18 ASSESSMENT — PATIENT HEALTH QUESTIONNAIRE - PHQ9
SUM OF ALL RESPONSES TO PHQ9 QUESTIONS 1 AND 2: 0
2. FEELING DOWN, DEPRESSED OR HOPELESS: NOT AT ALL
1. LITTLE INTEREST OR PLEASURE IN DOING THINGS: NOT AT ALL

## 2023-12-18 ASSESSMENT — ENCOUNTER SYMPTOMS
DEPRESSION: 0
LOSS OF SENSATION IN FEET: 0
OCCASIONAL FEELINGS OF UNSTEADINESS: 0

## 2023-12-18 NOTE — PROGRESS NOTES
"  Subjective     Patient ID: Jordon Waddell is a 73 y.o. male who presents for Follow-up.      Last Physical :  1 Years ago     Pt's PMH, PSH, SH, FH , meds and allergies was obtained / reviewed and updated .     Dental visits : Y  Vision issues : N  Hearing issues : N    Immunizations : Y    Diet :  could be better  Exercise:  Weight concerns :     Alcohol: as noted in   Tobacco: as noted in   Recreational drug use : None/ as noted in     G:  Parity:  Full term:    Premature:   (s):   Living :  Ab induced:   Ab spontaneous :  Ectopic :   Multiple :    PAP smear :  Mammogram :  Colonoscopy:    Metabolic screening   - Lipid   - Glucose  ======================================================    Visit Vitals  Blood Pressure 134/82   Pulse 71   Height 1.702 m (5' 7\")   Weight 101 kg (222 lb)   Body Mass Index 34.77 kg/m²   Smoking Status Former   Body Surface Area 2.19 m²      No LMP for male patient.     =====================================    Review of systems:  Constitutional: no chills, no fever and no night sweats.     Eyes: no blurred vision and no eyesight problems.     ENT: no hearing loss, no nasal congestion, no nasal discharge, no hoarseness and no sore throat.     Cardiovascular: no chest pain, no intermittent leg claudication, no lower extremity edema, no palpitations and no syncope.     Respiratory: no cough, no shortness of breath during exertion, no shortness of breath at rest and no wheezing.     Gastrointestinal: no abdominal pain, no blood in stools, no constipation, no diarrhea, no melena, no nausea, no rectal pain and no vomiting.     Genitourinary: no dysuria, no change in urinary frequency, no urinary hesitancy, no feelings of urinary urgency and no vaginal discharge.       Neurological: no difficulty walking, no headache, no limb weakness, no numbness and no tingling.     Psychiatric: no anxiety, no depression, no anhedonia and no substance use disorders.     Endocrine: no recent " weight gain and no recent weight loss.     Hematologic/Lymphatic: no tendency for easy bruising and no swollen glands.   ============================================================    Physical exam :    Constitutional: Alert and in no acute distress. Well developed, well nourished.     Eyes: Normal external exam. Pupils were equal in size, round, reactive to light (PERRL) with normal accommodation and extraocular movements intact (EOMI).     Ears, Nose, Mouth, and Throat: External inspection of ears and nose: Normal.  Otoscopic examination: Normal.      Neck: No neck mass was observed. Supple.     Cardiovascular: Heart rate and rhythm were normal, normal S1 and S2, no gallops, no murmurs and no pericardial rub    Pulmonary: No respiratory distress. Clear bilateral breath sounds.     Abdomen: Soft nontender; no abdominal mass palpated. No organomegaly.     l.      Skin: Normal skin color and pigmentation, normal skin turgor, and no rash.     Neurologic: Deep tendon reflexes were 2+ and symmetric. Sensation: Normal.     Psychiatric: Judgment and insight: Intact. Mood and affect: Normal.    Lymphatic : Cervical/ axillary/ groin Lns Palpable/ non palpable            All other systems have been reviewed and are negative for complaint.      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

## 2023-12-26 PROBLEM — Z23 NEED FOR INFLUENZA VACCINATION: Status: ACTIVE | Noted: 2023-12-26

## 2023-12-26 PROBLEM — Z00.00 ROUTINE GENERAL MEDICAL EXAMINATION AT A HEALTH CARE FACILITY: Status: ACTIVE | Noted: 2023-12-26

## 2024-01-02 DIAGNOSIS — E11.21 DIABETES MELLITUS WITH KIDNEY DISEASE (MULTI): ICD-10-CM

## 2024-01-02 RX ORDER — SITAGLIPTIN AND METFORMIN HYDROCHLORIDE 1000; 50 MG/1; MG/1
1 TABLET, FILM COATED ORAL 2 TIMES DAILY
Qty: 180 TABLET | Refills: 1 | Status: SHIPPED | OUTPATIENT
Start: 2024-01-02 | End: 2024-01-03 | Stop reason: SDUPTHER

## 2024-01-03 DIAGNOSIS — E11.21 DIABETES MELLITUS WITH KIDNEY DISEASE (MULTI): ICD-10-CM

## 2024-01-03 RX ORDER — SITAGLIPTIN AND METFORMIN HYDROCHLORIDE 1000; 50 MG/1; MG/1
1 TABLET, FILM COATED ORAL 2 TIMES DAILY
Qty: 180 TABLET | Refills: 1 | Status: SHIPPED | OUTPATIENT
Start: 2024-01-03

## 2024-01-24 ENCOUNTER — TELEPHONE (OUTPATIENT)
Dept: PREADMISSION TESTING | Facility: HOSPITAL | Age: 74
End: 2024-01-24
Payer: MEDICARE

## 2024-01-25 NOTE — TELEPHONE ENCOUNTER
Patient:   DEONTE MEHTA            MRN: [id number]            FIN: [id number]              Age:   54 years     Sex:  FEMALE     :  67   Associated Diagnoses:   None   Author:   KAJAL PHILLIPS     Subjective   pt only needed tylenol opver nt for rt thrpatpain.  no fever,chill. toleraitmg liquid diet  without much increase in paain.     Health Status   Allergies:    Allergies (1) Active Reaction  NKA None Documented    Current medications: Medications (8) Active  Scheduled: (3)  ampicillin-sulbactam-NaCl 0.9%  1.5 gm 100 mL, IVPB, Q6H (variable)  Benzocaine 20% ORAL topical spray 0.5 mL UD  1 spray, Oral Mucosa, Once (scheduled)  Morphine PF 2 mg/1 mL inj SDV  2 mg 1 mL, IV Push, Q4H  Continuous: (0)  PRN: (5)  Acetaminophen 500 mg tab  1,000 mg 2 tab, Oral, Q6H  Hydrocodone-acetaminophen 5-325 mg tab  1 tab, Oral, Q6H  Ibuprofen 600 mg tab  600 mg 1 tab, Oral, Q6H  Melatonin 3 mg tab  3 mg 1 tab, Oral, Q Bedtime  Morphine PF 4 mg/1 mL inj SDV  4 mg 1 mL, IV Push, Q4H        Objective   Intake and Output   I & O between:  2020 09:22 TO 2020 09:22  Med Dosing Weight:  65.9  kg   2020  24 Hour Intake:   101.00  ( 1.53 mL/kg )  24 Hour Output:   0.00           24 Hour Urine/Stool Output:   0.0  24 Hour Balance:   101.00           24 Hour Urine Output:   0.00  ( 0.00 mL/kg/hr )     VS/Measurements     Vitals between:   2020 09:22:38   TO   2020 09:22:38                   LAST RESULT MINIMUM MAXIMUM  Temperature 37.1 36.4 37.1  Heart Rate 66 47 66  Respiratory Rate 12 12 16  NISBP           113 110 124  NIDBP           54 54 80  SpO2                    96 96 96    Lines and Tubes:    LINES  Peripheral Intravenous Antecubital Right   Gauge: 20   Charted: 20 07:45  Inserted: 20   Days Since Insertion: 1 days  Indication of Use: IVPB, Saline Lock   .    General:  Alert and oriented, No acute distress.    Neck:  No carotid bruit, No jugular venous  PAT SCHEDULED   distention, no supraclavicular, submandibular or cervical ln noted.  rt pharyngeal wall w exudate  teeth /gums overall ok.    Respiratory:  Lungs are clear to auscultation, Respirations are non-labored, Breath sounds are equal.   Cardiovascular:  Normal rate, Regular rhythm.    Gastrointestinal:  Soft, Non-tender, Normal bowel sounds.    Musculoskeletal     No tenderness.     No swelling.     Integumentary:  Warm, Dry.    Neurologic:  Alert, Oriented.      Results Review   General results   Interpretation:               Labs between:  16-FEB-2020 09:22 to 17-FEB-2020 09:22  CBC:                 WBC  HgB  Hct  Plt  MCV  RDW   17-FEB-2020 7.5  13.0  39.5  266  96.8  12.8   DIFF:                 Seg  Neutroph//ABS  Lymph//ABS  Mono//ABS  EOS/ABS  17-FEB-2020 NOT APPLICABLE  65 // 4.8 24 // 1.8 8 // 0.6 3 // 0.2  BMP:                 Na  Cl  BUN  Glu   17-FEB-2020 140  (H) 109  8  91                              K  CO2  Cr  Ca                              4.0  27  0.74  8.5                        Impression and Plan   final diagnosis and hospital course.  ---right lateral pharyngeal wall necortic ln  s/p I&D 5 cc straw colored fluid 2/16/20--cytology adn cx pend  r/o neoplasm vs infection  dw dr alanis. pt w hx tonsillectomy so not bascee, could be reacitve./necoritc node from a molar  pain conotrlled,afebrile,no leukocytosis. tolerating liquids so dc to home w po augmentin adn close fu dr alanis, dr kam and dentist  --smoker  \"quit 2 week ago\" when st started  1/2 ppd 40 years  pt had nightmares w chantix in past so refuses to try that again. nicoderm patches  caused hive  rec  niocotine lozenges  condition on dc stable  see med rec  fu mikaela haney, dr alanis,dentist 3-5 days

## 2024-02-02 ENCOUNTER — APPOINTMENT (OUTPATIENT)
Dept: PREADMISSION TESTING | Facility: HOSPITAL | Age: 74
End: 2024-02-02
Payer: MEDICARE

## 2024-02-09 ENCOUNTER — HOSPITAL ENCOUNTER (OUTPATIENT)
Dept: RADIOLOGY | Facility: CLINIC | Age: 74
Discharge: HOME | End: 2024-02-09
Payer: MEDICARE

## 2024-02-09 ENCOUNTER — LAB (OUTPATIENT)
Dept: LAB | Facility: LAB | Age: 74
End: 2024-02-09
Payer: MEDICARE

## 2024-02-09 ENCOUNTER — PRE-ADMISSION TESTING (OUTPATIENT)
Dept: PREADMISSION TESTING | Facility: HOSPITAL | Age: 74
End: 2024-02-09
Payer: MEDICARE

## 2024-02-09 VITALS
BODY MASS INDEX: 34.46 KG/M2 | HEART RATE: 76 BPM | SYSTOLIC BLOOD PRESSURE: 153 MMHG | HEIGHT: 67 IN | TEMPERATURE: 97.3 F | WEIGHT: 219.58 LBS | DIASTOLIC BLOOD PRESSURE: 69 MMHG | OXYGEN SATURATION: 98 % | RESPIRATION RATE: 18 BRPM

## 2024-02-09 DIAGNOSIS — E11.9 TYPE 2 DIABETES MELLITUS WITHOUT COMPLICATION, WITHOUT LONG-TERM CURRENT USE OF INSULIN (MULTI): ICD-10-CM

## 2024-02-09 DIAGNOSIS — E78.00 PURE HYPERCHOLESTEROLEMIA: ICD-10-CM

## 2024-02-09 DIAGNOSIS — Z01.818 PREOP EXAMINATION: ICD-10-CM

## 2024-02-09 DIAGNOSIS — Z01.818 PREOP EXAMINATION: Primary | ICD-10-CM

## 2024-02-09 DIAGNOSIS — E11.21 DIABETES MELLITUS WITH KIDNEY DISEASE (MULTI): ICD-10-CM

## 2024-02-09 DIAGNOSIS — I10 BENIGN SYSTOLIC HYPERTENSION: ICD-10-CM

## 2024-02-09 LAB
ABO GROUP (TYPE) IN BLOOD: NORMAL
ANTIBODY SCREEN: NORMAL
ATRIAL RATE: 84 BPM
BASOPHILS # BLD AUTO: 0.02 X10*3/UL (ref 0–0.1)
BASOPHILS NFR BLD AUTO: 0.2 %
EOSINOPHIL # BLD AUTO: 0.23 X10*3/UL (ref 0–0.4)
EOSINOPHIL NFR BLD AUTO: 2.3 %
ERYTHROCYTE [DISTWIDTH] IN BLOOD BY AUTOMATED COUNT: 13 % (ref 11.5–14.5)
EST. AVERAGE GLUCOSE BLD GHB EST-MCNC: 114 MG/DL
HBA1C MFR BLD: 5.6 %
HCT VFR BLD AUTO: 45.7 % (ref 41–52)
HGB BLD-MCNC: 15 G/DL (ref 13.5–17.5)
IMM GRANULOCYTES # BLD AUTO: 0.03 X10*3/UL (ref 0–0.5)
IMM GRANULOCYTES NFR BLD AUTO: 0.3 % (ref 0–0.9)
LYMPHOCYTES # BLD AUTO: 1.36 X10*3/UL (ref 0.8–3)
LYMPHOCYTES NFR BLD AUTO: 13.8 %
MCH RBC QN AUTO: 29.7 PG (ref 26–34)
MCHC RBC AUTO-ENTMCNC: 32.8 G/DL (ref 32–36)
MCV RBC AUTO: 91 FL (ref 80–100)
MONOCYTES # BLD AUTO: 0.91 X10*3/UL (ref 0.05–0.8)
MONOCYTES NFR BLD AUTO: 9.2 %
NEUTROPHILS # BLD AUTO: 7.32 X10*3/UL (ref 1.6–5.5)
NEUTROPHILS NFR BLD AUTO: 74.2 %
NRBC BLD-RTO: 0 /100 WBCS (ref 0–0)
P AXIS: 15 DEGREES
P OFFSET: 189 MS
P ONSET: 138 MS
PLATELET # BLD AUTO: 126 X10*3/UL (ref 150–450)
PR INTERVAL: 152 MS
Q ONSET: 214 MS
QRS COUNT: 14 BEATS
QRS DURATION: 82 MS
QT INTERVAL: 382 MS
QTC CALCULATION(BAZETT): 451 MS
QTC FREDERICIA: 427 MS
R AXIS: 3 DEGREES
RBC # BLD AUTO: 5.05 X10*6/UL (ref 4.5–5.9)
RH FACTOR (ANTIGEN D): NORMAL
T AXIS: 85 DEGREES
T OFFSET: 405 MS
VENTRICULAR RATE: 84 BPM
WBC # BLD AUTO: 9.9 X10*3/UL (ref 4.4–11.3)

## 2024-02-09 PROCEDURE — 99204 OFFICE O/P NEW MOD 45 MIN: CPT | Performed by: NURSE PRACTITIONER

## 2024-02-09 PROCEDURE — 86850 RBC ANTIBODY SCREEN: CPT

## 2024-02-09 PROCEDURE — 85025 COMPLETE CBC W/AUTO DIFF WBC: CPT

## 2024-02-09 PROCEDURE — 87081 CULTURE SCREEN ONLY: CPT | Mod: AHULAB | Performed by: NURSE PRACTITIONER

## 2024-02-09 PROCEDURE — 93005 ELECTROCARDIOGRAM TRACING: CPT | Performed by: NURSE PRACTITIONER

## 2024-02-09 PROCEDURE — 36415 COLL VENOUS BLD VENIPUNCTURE: CPT

## 2024-02-09 PROCEDURE — 86901 BLOOD TYPING SEROLOGIC RH(D): CPT

## 2024-02-09 PROCEDURE — 86900 BLOOD TYPING SEROLOGIC ABO: CPT

## 2024-02-09 PROCEDURE — 83036 HEMOGLOBIN GLYCOSYLATED A1C: CPT

## 2024-02-09 RX ORDER — CHLORHEXIDINE GLUCONATE ORAL RINSE 1.2 MG/ML
15 SOLUTION DENTAL 2 TIMES DAILY
Qty: 473 ML | Refills: 0 | Status: SHIPPED | OUTPATIENT
Start: 2024-02-09 | End: 2024-02-21 | Stop reason: HOSPADM

## 2024-02-09 ASSESSMENT — ENCOUNTER SYMPTOMS
NECK NEGATIVE: 1
RESPIRATORY NEGATIVE: 1
CONSTITUTIONAL NEGATIVE: 1
ENDOCRINE NEGATIVE: 1
GASTROINTESTINAL NEGATIVE: 1
NEUROLOGICAL NEGATIVE: 1
EYES NEGATIVE: 1

## 2024-02-09 NOTE — CPM/PAT H&P
[facility]/PAT Evaluation       Name: Jordon Waddell (Jordon Waddell)  /Age: 1950/73 y.o.     In-Person           HPI        Date of Consult: 24    Referring Provider: Dr. Salazar    Left shoulder arthroplasty TSR and RTSR, 24    Jordon Waddell is a 73  year-old male who presents to the [facility] for perioperative risk assessment prior to surgery.    Patient presents with a primary diagnosis of left shoulder pain. left shoulder pain for last 6 to 8 months. Does not remember an injury. 60% of normal shoulder. 100% on the right side. No therapy. 8 out of 10 pain at its worst 6 out of 10 on average. It wakes him up at night. No radiation of symptoms. Patient has failed conservative management of injections, therapy and anti-inflammatories.     This note was created in part upon personal review of patient's medical records.      Patient is scheduled to have Left shoulder arthroplasty TSR and RTSR      Pt denies any past history of anesthetic complications such as PONV, awareness, prolonged sedation, dental damage, aspiration, cardiac arrest, difficult intubation, difficult I.V. access or unexpected hospital admissions.  NO malignant hyperthermia and or pseudocholinesterase deficiency.  No history of blood transfusions     Stop bang 3      The patient is not a [facility] and will accept blood and blood products if medically indicated.   Type and screen sent.     Past Medical History:   Diagnosis Date    Acute respiratory infection 2023    Arthritis 2014    Arthritis of left hip     Arthritis of left shoulder region     Cataract 2021    Diabetes mellitus (CMS/[facility]) 2009 A1C 6.1    Hyperlipidemia     Hypertension 2023    Left knee injury 2023    Macular degeneration     Obesity     Quadriceps muscle rupture, left, sequela 2023    Sleep apnea, obstructive     Stage 3a chronic kidney disease (CKD) (CMS/[facility])     23 Cr 1.18 GFR 66    Visual impairment  6th grade       Past Surgical History:   Procedure Laterality Date    CARPAL TUNNEL RELEASE Bilateral 2000    CYST REMOVAL  1970    TONSILLECTOMY  89    TOTAL HIP ARTHROPLASTY Left        Social History     Socioeconomic History    Marital status:      Spouse name: Not on file    Number of children: Not on file    Years of education: Not on file    Highest education level: Not on file   Occupational History    Not on file   Tobacco Use    Smoking status: Former     Packs/day: 1.00     Years: 5.00     Additional pack years: 0.00     Total pack years: 5.00     Types: Cigarettes     Start date: 1980     Quit date: 1985     Years since quittin.1    Smokeless tobacco: Never   Vaping Use    Vaping Use: Never used   Substance and Sexual Activity    Alcohol use: Yes     Alcohol/week: 1.0 standard drink of alcohol     Types: 1 Cans of beer per week    Drug use: Never    Sexual activity: Defer     Partners: Female   Other Topics Concern    Not on file   Social History Narrative    Not on file     Social Determinants of Health     Financial Resource Strain: Not on file   Food Insecurity: Not on file   Transportation Needs: Not on file   Physical Activity: Not on file   Stress: Not on file   Social Connections: Not on file   Intimate Partner Violence: Not on file   Housing Stability: Not on file        Family History   Problem Relation Name Age of Onset    Cancer Mother Marcela     Stroke Mother Marcela     Hypertension Mother Marcela     Hypertension Father Baltazar     Diabetes Maternal Grandfather Roger clark        Allergies   Allergen Reactions    Penicillins Unknown     Childhood        Current Outpatient Medications:     amLODIPine (Norvasc) 5 mg tablet, Take 1 tablet (5 mg) by mouth once daily., Disp: 90 tablet, Rfl: 1    ammonium lactate (Lac-Hydrin) 12 % lotion, APPLY  AND RUB  IN A THIN FILM TO AFFECTED AREAS TWICE DAILY.(AM AND PM)., Disp: , Rfl:     aspirin 81 mg EC tablet, TAKE 1 TABLET DAILY AS  "DIRECTED., Disp: , Rfl:     cholecalciferol (Vitamin D-3) 25 MCG (1000 UT) tablet, TAKE 1 TABLET DAILY., Disp: , Rfl:     glimepiride (Amaryl) 4 mg tablet, Take 1 tablet (4 mg) by mouth once daily., Disp: 90 tablet, Rfl: 1    krill-om-3-dha-epa-phospho-ast (krill oil) 1,221-167-53-80 mg capsule, 3 tablets by mouth 2 times daily, Disp: , Rfl:     lisinopriL-hydrochlorothiazide 20-12.5 mg tablet, Take 2 tablets by mouth once daily. (Patient taking differently: Take 1 tablet by mouth 2 times a day.), Disp: 180 tablet, Rfl: 1    meloxicam (Mobic) 15 mg tablet, Take 1 tablet (15 mg) by mouth once daily., Disp: 90 tablet, Rfl: 1    rosuvastatin (Crestor) 40 mg tablet, Take 1 tablet (40 mg) by mouth once daily at bedtime., Disp: 90 tablet, Rfl: 1    SITagliptin phos-metformin (Janumet) 50-1,000 mg tablet, Take 1 tablet by mouth 2 times a day., Disp: 180 tablet, Rfl: 1    chlorhexidine (Peridex) 0.12 % solution, Use 15 mL in the mouth or throat 2 times a day. Use night before surgery and morning of surgery Swish and spit (Patient not taking: Reported on 2/9/2024), Disp: 473 mL, Rfl: 0    OneTouch Ultra Test strip, USE 1 STRIP Daily, Disp: , Rfl:         PAT ROS:   Constitutional:   neg    Neuro/Psych:   neg    Eyes:   neg    Ears:   neg    Nose:   neg    Mouth:   neg    Throat:   neg    Neck:   neg    Cardio:    Functional 4 mets. Denies sob walking up 2 flights of stairs  Respiratory:   neg    Endocrine:   neg    GI:   neg    :   neg    Musculoskeletal:    Left shoulder pain  Hematologic:   neg    Skin:  neg        Physical Exam  Vitals reviewed. Physical exam within normal limits.  (wears glasses)         PAT AIRWAY:   Airway:     Mallampati::  II    Neck ROM::  Full   Several missing teeth      Heart Rate:  [76]   Temp:  [36.3 °C (97.3 °F)]   Resp:  [18]   BP: (153)/(69)   Height:  [170 cm (5' 6.93\")]   Weight:  [99.6 kg (219 lb 9.3 oz)]   SpO2:  [98 %]      EKG in [facility] 2/9/24:  NSR  Cannot r/o anterior infarct, age " undetermined  Abnormal EKG  HR 84 bpm    Lab Results   Component Value Date    GLUCOSE 163 (H) 12/18/2023    CALCIUM 10.1 12/18/2023     12/18/2023    K 4.1 12/18/2023    CO2 26 12/18/2023     12/18/2023    BUN 22 12/18/2023    CREATININE 1.18 12/18/2023      Lab Results   Component Value Date    WBC 9.9 02/09/2024    HGB 15.0 02/09/2024    HCT 45.7 02/09/2024    MCV 91 02/09/2024     (L) 02/09/2024        Assessment and Plan:         Patient is a 73-year-old male scheduled for a with Dr. Salazar  on 2/21/24.  Patient has no active cardiac symptoms.   Patient denies any chest pain, tightness, heaviness, pressure, radiating pain, palpitations, irregular heartbeats, lightheadedness, cough, congestion, shortness of breath, WAY, PND, near syncope, weight loss or gain.     RCRI  1 , 6 % Risk of MACE    Cardiology:  -- Controlled HTN: EKG, CBC ordered      Hematology:  Patient instructed to ambulate as soon as possible postoperatively to decrease thromboembolic risk.   Initiate mechanical DVT prophylaxis as soon as possible and initiate chemical prophylaxis when deemed safe from a bleeding standpoint post surgery.   -- history of thrombocytopenia: Current platelet count of 126,000. No further action at this time    Aleidai: 5    Endocrinology:  -- NIDDM: hgba1c ordered    Renal:  -- Recommendations to avoid nephrotoxic drugs and carefully monitor fluid status to maintain euvolemia. Use dose adjusted medications as needed for the underlying level of renal function.      Risk assessment complete.  Patient is scheduled for a low surgical risk procedure.        Preoperative medication instructions were provided and reviewed with the patient.  Any additional testing or evaluation was explained to the patient.  Nothing by mouth instructions were discussed and patient's questions were answered prior to conclusion to this encounter.  Patient verbalized understanding of preoperative instructions given in  preadmission testing; discharge instructions available in EMR.    This note was dictated by a speech recognition.  Minor errors may have been detected in a speech recognition.

## 2024-02-09 NOTE — PREPROCEDURE INSTRUCTIONS
Medication List            Accurate as of February 9, 2024  8:59 AM. Always use your most recent med list.                amLODIPine 5 mg tablet  Commonly known as: Norvasc  Take 1 tablet (5 mg) by mouth once daily.  Medication Adjustments for Surgery: Take morning of surgery with sip of water, no other fluids     ammonium lactate 12 % lotion  Commonly known as: Lac-Hydrin  Medication Adjustments for Surgery: Continue until night before surgery     aspirin 81 mg EC tablet  Medication Adjustments for Surgery: Take morning of surgery with sip of water, no other fluids     chlorhexidine 0.12 % solution  Commonly known as: Peridex  Use 15 mL in the mouth or throat 2 times a day. Use night before surgery and morning of surgery Swish and spit  Medication Adjustments for Surgery: Take morning of surgery with sip of water, no other fluids     cholecalciferol 25 MCG (1000 UT) tablet  Commonly known as: Vitamin D-3  Medication Adjustments for Surgery: Continue until night before surgery     glimepiride 4 mg tablet  Commonly known as: Amaryl  Take 1 tablet (4 mg) by mouth once daily.  Medication Adjustments for Surgery: Continue until night before surgery     Janumet 50-1,000 mg tablet  Generic drug: SITagliptin phos-metformin  Take 1 tablet by mouth 2 times a day.  Medication Adjustments for Surgery: Continue until night before surgery     krill oil 1,943-275-72-80 mg capsule  Generic drug: krill-om-3-dha-epa-phospho-ast  Medication Adjustments for Surgery: Stop 7 days before surgery     lisinopriL-hydrochlorothiazide 20-12.5 mg tablet  Take 2 tablets by mouth once daily.  Medication Adjustments for Surgery: Continue until night before surgery     meloxicam 15 mg tablet  Commonly known as: Mobic  Take 1 tablet (15 mg) by mouth once daily.  Medication Adjustments for Surgery: Stop 7 days before surgery     OneTouch Ultra Test strip  Generic drug: blood sugar diagnostic  Medication Adjustments for Surgery: Take morning of  surgery with sip of water, no other fluids     rosuvastatin 40 mg tablet  Commonly known as: Crestor  Take 1 tablet (40 mg) by mouth once daily at bedtime.  Medication Adjustments for Surgery: Take morning of surgery with sip of water, no other fluids                      CONTACT SURGEON'S OFFICE IF YOU DEVELOP:  * Fever = 100.4 F   * New respiratory symptoms (e.g. cough, shortness of breath, respiratory distress, sore throat)  * Recent loss of taste or smell  *Flu like symptoms such as headache, fatigue or gastrointestinal symptoms  * You develop any open sores, shingles, burning or painful urination   AND/OR:  * You no longer wish to have the surgery.  * Any other personal circumstances change that may lead to the need to cancel or defer this surgery.  *You were admitted to any hospital within one week of your planned procedure.    SMOKING:  *Quitting smoking can make a huge difference to your health and recovery from surgery.    *If you need help with quitting, call 6-227-QUIT-NOW.    THE DAY BEFORE SURGERY:  *Do not eat any food after midnight the night before surgery.   *You are permitted to drink clear liquids (i.e. water, black coffee (no milk or cream), tea, apple juice and electrolyte drinks (gatorade)) up to 10 ounces, up to 2 hours before your arrival time.  *You may chew gum until 2 hours before your surgery    DIABETICS:  If diabetic, nothing by mouth (no solids or liquids) for 8 hours prior to arrival time.   Please check fasting blood sugar upon waking up.  If fasting sugar is <80 mg/dl, please drink 100ml/3oz of apple juice no later than 2 hours prior to arrival time.      SURGICAL TIME  *You will be contacted between 2 p.m. and 6 p.m. the business day before your surgery with your arrival time.  *If you haven't received a call by 6pm, call [phone].  *Scheduled surgery times may change and you will be notified if this occurs-check your personal voicemail for any updates.    ON THE MORNING OF  SURGERY:  *Wear comfortable, loose fitting clothing.   *Do not use moisturizers, creams, lotions or perfume.  *All jewelry and valuables should be left at home.  *Prosthetic devices such as contact lenses, hearing aids, dentures, eyelash extensions, hairpins and body piercing must be removed before surgery.    BRING WITH YOU:  *Photo ID and insurance card  *Current list of medicines and allergies  *Pacemaker/Defibrillator/Heart stent cards  *CPAP machine and mask  *Slings/splints/crutches  *Copy of your complete Advanced Directive/DHPOA-if applicable  *Neurostimulator implant remote    PARKING AND ARRIVAL:  *Check in at the Main Entrance desk and let them know you are here for surgery.  *You will be directed to the 2nd floor surgical waiting area.    AFTER OUTPATIENT SURGERY:  *A responsible adult MUST accompany you at the time of discharge and stay with you for 24 hours after your surgery.  *You may NOT drive yourself home after surgery.  *You may use a taxi or ride sharing service ([employer], [employer]) to return home ONLY if you are accompanied by a friend or family member.  *Instructions for resuming your medications will be provided by your surgeon.

## 2024-02-09 NOTE — H&P (VIEW-ONLY)
[facility]/PAT Evaluation       Name: Jordon Waddell (Jordon Waddell)  /Age: 1950/73 y.o.     In-Person           HPI        Date of Consult: 24    Referring Provider: Dr. Salazar    Left shoulder arthroplasty TSR and RTSR, 24    Jordon Waddell is a 73  year-old male who presents to the [facility] for perioperative risk assessment prior to surgery.    Patient presents with a primary diagnosis of left shoulder pain. left shoulder pain for last 6 to 8 months. Does not remember an injury. 60% of normal shoulder. 100% on the right side. No therapy. 8 out of 10 pain at its worst 6 out of 10 on average. It wakes him up at night. No radiation of symptoms. Patient has failed conservative management of injections, therapy and anti-inflammatories.     This note was created in part upon personal review of patient's medical records.      Patient is scheduled to have Left shoulder arthroplasty TSR and RTSR      Pt denies any past history of anesthetic complications such as PONV, awareness, prolonged sedation, dental damage, aspiration, cardiac arrest, difficult intubation, difficult I.V. access or unexpected hospital admissions.  NO malignant hyperthermia and or pseudocholinesterase deficiency.  No history of blood transfusions     Stop bang 3      The patient is not a [employer] and will accept blood and blood products if medically indicated.   Type and screen sent.     Past Medical History:   Diagnosis Date    Acute respiratory infection 2023    Arthritis 2014    Arthritis of left hip     Arthritis of left shoulder region     Cataract 2021    Diabetes mellitus (CMS/[facility]) 2009 A1C 6.1    Hyperlipidemia     Hypertension 2023    Left knee injury 2023    Macular degeneration     Obesity     Quadriceps muscle rupture, left, sequela 2023    Sleep apnea, obstructive     Stage 3a chronic kidney disease (CKD) (CMS/[facility])     23 Cr 1.18 GFR 66    Visual impairment  6th grade       Past Surgical History:   Procedure Laterality Date    CARPAL TUNNEL RELEASE Bilateral 2000    CYST REMOVAL  1970    TONSILLECTOMY  195    TOTAL HIP ARTHROPLASTY Left        Social History     Socioeconomic History    Marital status:      Spouse name: Not on file    Number of children: Not on file    Years of education: Not on file    Highest education level: Not on file   Occupational History    Not on file   Tobacco Use    Smoking status: Former     Packs/day: 1.00     Years: 5.00     Additional pack years: 0.00     Total pack years: 5.00     Types: Cigarettes     Start date: 1980     Quit date: 1985     Years since quittin.1    Smokeless tobacco: Never   Vaping Use    Vaping Use: Never used   Substance and Sexual Activity    Alcohol use: Yes     Alcohol/week: 1.0 standard drink of alcohol     Types: 1 Cans of beer per week    Drug use: Never    Sexual activity: Defer     Partners: Female   Other Topics Concern    Not on file   Social History Narrative    Not on file     Social Determinants of Health     Financial Resource Strain: Not on file   Food Insecurity: Not on file   Transportation Needs: Not on file   Physical Activity: Not on file   Stress: Not on file   Social Connections: Not on file   Intimate Partner Violence: Not on file   Housing Stability: Not on file        Family History   Problem Relation Name Age of Onset    Cancer Mother Marcela     Stroke Mother Marcela     Hypertension Mother Marcela     Hypertension Father Baltazar     Diabetes Maternal Grandfather Roger clark        Allergies   Allergen Reactions    Penicillins Unknown     Childhood        Current Outpatient Medications:     amLODIPine (Norvasc) 5 mg tablet, Take 1 tablet (5 mg) by mouth once daily., Disp: 90 tablet, Rfl: 1    ammonium lactate (Lac-Hydrin) 12 % lotion, APPLY  AND RUB  IN A THIN FILM TO AFFECTED AREAS TWICE DAILY.(AM AND PM)., Disp: , Rfl:     aspirin 81 mg EC tablet, TAKE 1 TABLET DAILY AS  "DIRECTED., Disp: , Rfl:     cholecalciferol (Vitamin D-3) 25 MCG (1000 UT) tablet, TAKE 1 TABLET DAILY., Disp: , Rfl:     glimepiride (Amaryl) 4 mg tablet, Take 1 tablet (4 mg) by mouth once daily., Disp: 90 tablet, Rfl: 1    krill-om-3-dha-epa-phospho-ast (krill oil) 1,483-941-38-80 mg capsule, 3 tablets by mouth 2 times daily, Disp: , Rfl:     lisinopriL-hydrochlorothiazide 20-12.5 mg tablet, Take 2 tablets by mouth once daily. (Patient taking differently: Take 1 tablet by mouth 2 times a day.), Disp: 180 tablet, Rfl: 1    meloxicam (Mobic) 15 mg tablet, Take 1 tablet (15 mg) by mouth once daily., Disp: 90 tablet, Rfl: 1    rosuvastatin (Crestor) 40 mg tablet, Take 1 tablet (40 mg) by mouth once daily at bedtime., Disp: 90 tablet, Rfl: 1    SITagliptin phos-metformin (Janumet) 50-1,000 mg tablet, Take 1 tablet by mouth 2 times a day., Disp: 180 tablet, Rfl: 1    chlorhexidine (Peridex) 0.12 % solution, Use 15 mL in the mouth or throat 2 times a day. Use night before surgery and morning of surgery Swish and spit (Patient not taking: Reported on 2/9/2024), Disp: 473 mL, Rfl: 0    OneTouch Ultra Test strip, USE 1 STRIP Daily, Disp: , Rfl:         PAT ROS:   Constitutional:   neg    Neuro/Psych:   neg    Eyes:   neg    Ears:   neg    Nose:   neg    Mouth:   neg    Throat:   neg    Neck:   neg    Cardio:    Functional 4 mets. Denies sob walking up 2 flights of stairs  Respiratory:   neg    Endocrine:   neg    GI:   neg    :   neg    Musculoskeletal:    Left shoulder pain  Hematologic:   neg    Skin:  neg        Physical Exam  Vitals reviewed. Physical exam within normal limits.  (wears glasses)         PAT AIRWAY:   Airway:     Mallampati::  II    Neck ROM::  Full   Several missing teeth      Heart Rate:  [76]   Temp:  [36.3 °C (97.3 °F)]   Resp:  [18]   BP: (153)/(69)   Height:  [170 cm (5' 6.93\")]   Weight:  [99.6 kg (219 lb 9.3 oz)]   SpO2:  [98 %]      EKG in [facility] 2/9/24:  NSR  Cannot r/o anterior infarct, age " undetermined  Abnormal EKG  HR 84 bpm    Lab Results   Component Value Date    GLUCOSE 163 (H) 12/18/2023    CALCIUM 10.1 12/18/2023     12/18/2023    K 4.1 12/18/2023    CO2 26 12/18/2023     12/18/2023    BUN 22 12/18/2023    CREATININE 1.18 12/18/2023      Lab Results   Component Value Date    WBC 9.9 02/09/2024    HGB 15.0 02/09/2024    HCT 45.7 02/09/2024    MCV 91 02/09/2024     (L) 02/09/2024        Assessment and Plan:         Patient is a 73-year-old male scheduled for a with Dr. Salazar  on 2/21/24.  Patient has no active cardiac symptoms.   Patient denies any chest pain, tightness, heaviness, pressure, radiating pain, palpitations, irregular heartbeats, lightheadedness, cough, congestion, shortness of breath, WAY, PND, near syncope, weight loss or gain.     RCRI  1 , 6 % Risk of MACE    Cardiology:  -- Controlled HTN: EKG, CBC ordered      Hematology:  Patient instructed to ambulate as soon as possible postoperatively to decrease thromboembolic risk.   Initiate mechanical DVT prophylaxis as soon as possible and initiate chemical prophylaxis when deemed safe from a bleeding standpoint post surgery.   -- history of thrombocytopenia: Current platelet count of 126,000. No further action at this time    Aleidai: 5    Endocrinology:  -- NIDDM: hgba1c ordered    Renal:  -- Recommendations to avoid nephrotoxic drugs and carefully monitor fluid status to maintain euvolemia. Use dose adjusted medications as needed for the underlying level of renal function.      Risk assessment complete.  Patient is scheduled for a low surgical risk procedure.        Preoperative medication instructions were provided and reviewed with the patient.  Any additional testing or evaluation was explained to the patient.  Nothing by mouth instructions were discussed and patient's questions were answered prior to conclusion to this encounter.  Patient verbalized understanding of preoperative instructions given in  preadmission testing; discharge instructions available in EMR.    This note was dictated by a speech recognition.  Minor errors may have been detected in a speech recognition.

## 2024-02-11 DIAGNOSIS — E78.5 HYPERLIPIDEMIA, UNSPECIFIED HYPERLIPIDEMIA TYPE: ICD-10-CM

## 2024-02-11 LAB — STAPHYLOCOCCUS SPEC CULT: NORMAL

## 2024-02-11 RX ORDER — ROSUVASTATIN CALCIUM 40 MG/1
40 TABLET, COATED ORAL NIGHTLY
Qty: 90 TABLET | Refills: 1 | Status: SHIPPED | OUTPATIENT
Start: 2024-02-11

## 2024-02-12 DIAGNOSIS — M19.012 ARTHRITIS OF SHOULDER REGION, LEFT: Primary | ICD-10-CM

## 2024-02-18 DIAGNOSIS — L30.9 CHRONIC ECZEMA: ICD-10-CM

## 2024-02-19 RX ORDER — AMMONIUM LACTATE 12 G/100G
LOTION TOPICAL 2 TIMES DAILY
Qty: 225 G | Refills: 1 | Status: SHIPPED | OUTPATIENT
Start: 2024-02-19

## 2024-02-20 ENCOUNTER — ANESTHESIA EVENT (OUTPATIENT)
Dept: OPERATING ROOM | Facility: HOSPITAL | Age: 74
End: 2024-02-20
Payer: MEDICARE

## 2024-02-20 PROBLEM — M19.019 SHOULDER ARTHRITIS: Status: ACTIVE | Noted: 2024-02-20

## 2024-02-20 RX ORDER — ONDANSETRON HYDROCHLORIDE 2 MG/ML
4 INJECTION, SOLUTION INTRAVENOUS EVERY 8 HOURS PRN
Status: CANCELLED | OUTPATIENT
Start: 2024-02-20

## 2024-02-20 RX ORDER — DOCUSATE SODIUM 100 MG/1
100 CAPSULE, LIQUID FILLED ORAL 2 TIMES DAILY
Status: CANCELLED | OUTPATIENT
Start: 2024-02-20

## 2024-02-20 RX ORDER — SODIUM CHLORIDE, SODIUM LACTATE, POTASSIUM CHLORIDE, CALCIUM CHLORIDE 600; 310; 30; 20 MG/100ML; MG/100ML; MG/100ML; MG/100ML
50 INJECTION, SOLUTION INTRAVENOUS CONTINUOUS
Status: CANCELLED | OUTPATIENT
Start: 2024-02-20

## 2024-02-20 RX ORDER — NALOXONE HYDROCHLORIDE 1 MG/ML
0.2 INJECTION INTRAMUSCULAR; INTRAVENOUS; SUBCUTANEOUS EVERY 5 MIN PRN
Status: CANCELLED | OUTPATIENT
Start: 2024-02-20

## 2024-02-20 RX ORDER — ACETAMINOPHEN 325 MG/1
650 TABLET ORAL EVERY 4 HOURS PRN
Status: CANCELLED | OUTPATIENT
Start: 2024-02-20

## 2024-02-20 RX ORDER — OXYCODONE AND ACETAMINOPHEN 5; 325 MG/1; MG/1
1 TABLET ORAL EVERY 4 HOURS PRN
Status: CANCELLED | OUTPATIENT
Start: 2024-02-20

## 2024-02-20 RX ORDER — ONDANSETRON 4 MG/1
4 TABLET, FILM COATED ORAL EVERY 8 HOURS PRN
Status: CANCELLED | OUTPATIENT
Start: 2024-02-20

## 2024-02-21 ENCOUNTER — APPOINTMENT (OUTPATIENT)
Dept: RADIOLOGY | Facility: HOSPITAL | Age: 74
End: 2024-02-21
Payer: MEDICARE

## 2024-02-21 ENCOUNTER — PHARMACY VISIT (OUTPATIENT)
Dept: PHARMACY | Facility: CLINIC | Age: 74
End: 2024-02-21
Payer: MEDICARE

## 2024-02-21 ENCOUNTER — HOSPITAL ENCOUNTER (OUTPATIENT)
Facility: HOSPITAL | Age: 74
Discharge: HOME | End: 2024-02-21
Attending: ORTHOPAEDIC SURGERY | Admitting: ORTHOPAEDIC SURGERY
Payer: MEDICARE

## 2024-02-21 ENCOUNTER — ANESTHESIA (OUTPATIENT)
Dept: OPERATING ROOM | Facility: HOSPITAL | Age: 74
End: 2024-02-21
Payer: MEDICARE

## 2024-02-21 VITALS
OXYGEN SATURATION: 93 % | SYSTOLIC BLOOD PRESSURE: 128 MMHG | BODY MASS INDEX: 34.01 KG/M2 | HEIGHT: 67 IN | RESPIRATION RATE: 14 BRPM | WEIGHT: 216.71 LBS | HEART RATE: 80 BPM | DIASTOLIC BLOOD PRESSURE: 66 MMHG | TEMPERATURE: 97.9 F

## 2024-02-21 DIAGNOSIS — G89.18 ACUTE POST-OPERATIVE PAIN: Primary | ICD-10-CM

## 2024-02-21 DIAGNOSIS — Z98.890 POST-OPERATIVE STATE: ICD-10-CM

## 2024-02-21 DIAGNOSIS — M19.012 ARTHRITIS OF LEFT SHOULDER REGION: Primary | ICD-10-CM

## 2024-02-21 DIAGNOSIS — I10 HYPERTENSION, UNSPECIFIED TYPE: ICD-10-CM

## 2024-02-21 PROBLEM — E11.9 DIABETES MELLITUS, TYPE 2 (MULTI): Status: ACTIVE | Noted: 2023-12-18

## 2024-02-21 PROBLEM — J18.9 PNEUMONIA: Status: ACTIVE | Noted: 2024-02-21

## 2024-02-21 LAB
ABO GROUP (TYPE) IN BLOOD: NORMAL
GLUCOSE BLD MANUAL STRIP-MCNC: 151 MG/DL (ref 74–99)
GLUCOSE BLD MANUAL STRIP-MCNC: 216 MG/DL (ref 74–99)
RH FACTOR (ANTIGEN D): NORMAL

## 2024-02-21 PROCEDURE — 2500000004 HC RX 250 GENERAL PHARMACY W/ HCPCS (ALT 636 FOR OP/ED): Performed by: NURSE ANESTHETIST, CERTIFIED REGISTERED

## 2024-02-21 PROCEDURE — 2500000005 HC RX 250 GENERAL PHARMACY W/O HCPCS: Performed by: NURSE PRACTITIONER

## 2024-02-21 PROCEDURE — 23430 REPAIR BICEPS TENDON: CPT | Performed by: ORTHOPAEDIC SURGERY

## 2024-02-21 PROCEDURE — 2500000005 HC RX 250 GENERAL PHARMACY W/O HCPCS: Performed by: ANESTHESIOLOGY

## 2024-02-21 PROCEDURE — 3700000002 HC GENERAL ANESTHESIA TIME - EACH INCREMENTAL 1 MINUTE: Performed by: ORTHOPAEDIC SURGERY

## 2024-02-21 PROCEDURE — A23472 PR RECONSTR TOTAL SHOULDER IMPLANT: Performed by: ANESTHESIOLOGY

## 2024-02-21 PROCEDURE — 2500000004 HC RX 250 GENERAL PHARMACY W/ HCPCS (ALT 636 FOR OP/ED): Performed by: ANESTHESIOLOGY

## 2024-02-21 PROCEDURE — 7100000002 HC RECOVERY ROOM TIME - EACH INCREMENTAL 1 MINUTE: Performed by: ORTHOPAEDIC SURGERY

## 2024-02-21 PROCEDURE — 3600000005 HC OR TIME - INITIAL BASE CHARGE - PROCEDURE LEVEL FIVE: Performed by: ORTHOPAEDIC SURGERY

## 2024-02-21 PROCEDURE — 82947 ASSAY GLUCOSE BLOOD QUANT: CPT

## 2024-02-21 PROCEDURE — 7100000010 HC PHASE TWO TIME - EACH INCREMENTAL 1 MINUTE: Performed by: ORTHOPAEDIC SURGERY

## 2024-02-21 PROCEDURE — 99100 ANES PT EXTEME AGE<1 YR&>70: CPT | Performed by: ANESTHESIOLOGY

## 2024-02-21 PROCEDURE — 3600000010 HC OR TIME - EACH INCREMENTAL 1 MINUTE - PROCEDURE LEVEL FIVE: Performed by: ORTHOPAEDIC SURGERY

## 2024-02-21 PROCEDURE — 23430 REPAIR BICEPS TENDON: CPT | Performed by: NURSE PRACTITIONER

## 2024-02-21 PROCEDURE — A4217 STERILE WATER/SALINE, 500 ML: HCPCS | Performed by: ORTHOPAEDIC SURGERY

## 2024-02-21 PROCEDURE — 97535 SELF CARE MNGMENT TRAINING: CPT | Mod: GO

## 2024-02-21 PROCEDURE — 64415 NJX AA&/STRD BRCH PLXS IMG: CPT | Performed by: ANESTHESIOLOGY

## 2024-02-21 PROCEDURE — RXMED WILLOW AMBULATORY MEDICATION CHARGE

## 2024-02-21 PROCEDURE — 7100000009 HC PHASE TWO TIME - INITIAL BASE CHARGE: Performed by: ORTHOPAEDIC SURGERY

## 2024-02-21 PROCEDURE — 23472 RECONSTRUCT SHOULDER JOINT: CPT | Performed by: NURSE PRACTITIONER

## 2024-02-21 PROCEDURE — 7100000001 HC RECOVERY ROOM TIME - INITIAL BASE CHARGE: Performed by: ORTHOPAEDIC SURGERY

## 2024-02-21 PROCEDURE — 3700000001 HC GENERAL ANESTHESIA TIME - INITIAL BASE CHARGE: Performed by: ORTHOPAEDIC SURGERY

## 2024-02-21 PROCEDURE — 2500000004 HC RX 250 GENERAL PHARMACY W/ HCPCS (ALT 636 FOR OP/ED): Performed by: ORTHOPAEDIC SURGERY

## 2024-02-21 PROCEDURE — 97165 OT EVAL LOW COMPLEX 30 MIN: CPT | Mod: GO

## 2024-02-21 PROCEDURE — 7100000011 HC EXTENDED STAY RECOVERY HOURLY - NURSING UNIT

## 2024-02-21 PROCEDURE — 36415 COLL VENOUS BLD VENIPUNCTURE: CPT | Performed by: ORTHOPAEDIC SURGERY

## 2024-02-21 PROCEDURE — C1776 JOINT DEVICE (IMPLANTABLE): HCPCS | Performed by: ORTHOPAEDIC SURGERY

## 2024-02-21 PROCEDURE — C1713 ANCHOR/SCREW BN/BN,TIS/BN: HCPCS | Performed by: ORTHOPAEDIC SURGERY

## 2024-02-21 PROCEDURE — 73020 X-RAY EXAM OF SHOULDER: CPT | Mod: LEFT SIDE | Performed by: RADIOLOGY

## 2024-02-21 PROCEDURE — 2720000007 HC OR 272 NO HCPCS: Performed by: ORTHOPAEDIC SURGERY

## 2024-02-21 PROCEDURE — 23472 RECONSTRUCT SHOULDER JOINT: CPT | Performed by: ORTHOPAEDIC SURGERY

## 2024-02-21 PROCEDURE — 2500000005 HC RX 250 GENERAL PHARMACY W/O HCPCS: Performed by: NURSE ANESTHETIST, CERTIFIED REGISTERED

## 2024-02-21 PROCEDURE — 2780000003 HC OR 278 NO HCPCS: Performed by: ORTHOPAEDIC SURGERY

## 2024-02-21 PROCEDURE — A23472 PR RECONSTR TOTAL SHOULDER IMPLANT: Performed by: NURSE ANESTHETIST, CERTIFIED REGISTERED

## 2024-02-21 PROCEDURE — 2500000004 HC RX 250 GENERAL PHARMACY W/ HCPCS (ALT 636 FOR OP/ED): Performed by: NURSE PRACTITIONER

## 2024-02-21 PROCEDURE — 73020 X-RAY EXAM OF SHOULDER: CPT | Mod: LT

## 2024-02-21 PROCEDURE — 97110 THERAPEUTIC EXERCISES: CPT | Mod: GO

## 2024-02-21 DEVICE — SHOULDER INNOVATIONS - TSA - HUMERAL SHORT STEM SYSTEM.  HUMERAL HEAD.  18MM HIGH X 48MM DIAMETER R26MM ARTICULATION.
Type: IMPLANTABLE DEVICE | Site: SHOULDER | Status: FUNCTIONAL
Brand: SHOULDER INNOVATIONS HUMERAL SHORT STEM SYSTEM

## 2024-02-21 DEVICE — IMPLANTABLE DEVICE: Type: IMPLANTABLE DEVICE | Site: SHOULDER | Status: FUNCTIONAL

## 2024-02-21 DEVICE — BONE CEMENT, CMW 2 , FAST SET, 20G: Type: IMPLANTABLE DEVICE | Site: SHOULDER | Status: FUNCTIONAL

## 2024-02-21 RX ORDER — PHENYLEPHRINE HCL IN 0.9% NACL 1 MG/10 ML
SYRINGE (ML) INTRAVENOUS AS NEEDED
Status: DISCONTINUED | OUTPATIENT
Start: 2024-02-21 | End: 2024-02-21

## 2024-02-21 RX ORDER — SODIUM CHLORIDE, SODIUM LACTATE, POTASSIUM CHLORIDE, CALCIUM CHLORIDE 600; 310; 30; 20 MG/100ML; MG/100ML; MG/100ML; MG/100ML
100 INJECTION, SOLUTION INTRAVENOUS CONTINUOUS
Status: DISCONTINUED | OUTPATIENT
Start: 2024-02-21 | End: 2024-02-21 | Stop reason: HOSPADM

## 2024-02-21 RX ORDER — MEPERIDINE HYDROCHLORIDE 25 MG/ML
12.5 INJECTION INTRAMUSCULAR; INTRAVENOUS; SUBCUTANEOUS EVERY 10 MIN PRN
Status: DISCONTINUED | OUTPATIENT
Start: 2024-02-21 | End: 2024-02-21 | Stop reason: HOSPADM

## 2024-02-21 RX ORDER — LIDOCAINE HYDROCHLORIDE 20 MG/ML
INJECTION, SOLUTION EPIDURAL; INFILTRATION; INTRACAUDAL; PERINEURAL AS NEEDED
Status: DISCONTINUED | OUTPATIENT
Start: 2024-02-21 | End: 2024-02-21

## 2024-02-21 RX ORDER — MIDAZOLAM HYDROCHLORIDE 1 MG/ML
INJECTION INTRAMUSCULAR; INTRAVENOUS AS NEEDED
Status: DISCONTINUED | OUTPATIENT
Start: 2024-02-21 | End: 2024-02-21

## 2024-02-21 RX ORDER — PNV NO.95/FERROUS FUM/FOLIC AC 28MG-0.8MG
100 TABLET ORAL DAILY
COMMUNITY

## 2024-02-21 RX ORDER — DOCUSATE SODIUM 100 MG/1
100 CAPSULE, LIQUID FILLED ORAL 2 TIMES DAILY
Qty: 30 CAPSULE | Refills: 0 | Status: SHIPPED | OUTPATIENT
Start: 2024-02-21 | End: 2024-03-07

## 2024-02-21 RX ORDER — LIDOCAINE HYDROCHLORIDE 10 MG/ML
0.1 INJECTION, SOLUTION EPIDURAL; INFILTRATION; INTRACAUDAL; PERINEURAL ONCE
Status: DISCONTINUED | OUTPATIENT
Start: 2024-02-21 | End: 2024-02-21 | Stop reason: HOSPADM

## 2024-02-21 RX ORDER — DEXAMETHASONE SODIUM PHOSPHATE 4 MG/ML
INJECTION, SOLUTION INTRA-ARTICULAR; INTRALESIONAL; INTRAMUSCULAR; INTRAVENOUS; SOFT TISSUE AS NEEDED
Status: DISCONTINUED | OUTPATIENT
Start: 2024-02-21 | End: 2024-02-21

## 2024-02-21 RX ORDER — TRANEXAMIC ACID 100 MG/ML
INJECTION, SOLUTION INTRAVENOUS AS NEEDED
Status: DISCONTINUED | OUTPATIENT
Start: 2024-02-21 | End: 2024-02-21

## 2024-02-21 RX ORDER — ONDANSETRON HYDROCHLORIDE 2 MG/ML
INJECTION, SOLUTION INTRAVENOUS AS NEEDED
Status: DISCONTINUED | OUTPATIENT
Start: 2024-02-21 | End: 2024-02-21

## 2024-02-21 RX ORDER — ROCURONIUM BROMIDE 10 MG/ML
INJECTION, SOLUTION INTRAVENOUS AS NEEDED
Status: DISCONTINUED | OUTPATIENT
Start: 2024-02-21 | End: 2024-02-21

## 2024-02-21 RX ORDER — FENTANYL CITRATE 50 UG/ML
INJECTION, SOLUTION INTRAMUSCULAR; INTRAVENOUS AS NEEDED
Status: DISCONTINUED | OUTPATIENT
Start: 2024-02-21 | End: 2024-02-21

## 2024-02-21 RX ORDER — KETOROLAC TROMETHAMINE 30 MG/ML
INJECTION, SOLUTION INTRAMUSCULAR; INTRAVENOUS AS NEEDED
Status: DISCONTINUED | OUTPATIENT
Start: 2024-02-21 | End: 2024-02-21

## 2024-02-21 RX ORDER — ONDANSETRON HYDROCHLORIDE 2 MG/ML
4 INJECTION, SOLUTION INTRAVENOUS ONCE AS NEEDED
Status: DISCONTINUED | OUTPATIENT
Start: 2024-02-21 | End: 2024-02-21 | Stop reason: HOSPADM

## 2024-02-21 RX ORDER — OXYCODONE HYDROCHLORIDE 5 MG/1
5 TABLET ORAL EVERY 4 HOURS PRN
Status: DISCONTINUED | OUTPATIENT
Start: 2024-02-21 | End: 2024-02-21 | Stop reason: HOSPADM

## 2024-02-21 RX ORDER — CEFAZOLIN SODIUM 2 G/100ML
2 INJECTION, SOLUTION INTRAVENOUS ONCE
Status: COMPLETED | OUTPATIENT
Start: 2024-02-21 | End: 2024-02-21

## 2024-02-21 RX ORDER — SODIUM CHLORIDE 0.9 G/100ML
IRRIGANT IRRIGATION AS NEEDED
Status: DISCONTINUED | OUTPATIENT
Start: 2024-02-21 | End: 2024-02-21 | Stop reason: HOSPADM

## 2024-02-21 RX ORDER — OXYCODONE AND ACETAMINOPHEN 5; 325 MG/1; MG/1
1 TABLET ORAL EVERY 6 HOURS PRN
Qty: 24 TABLET | Refills: 0 | Status: SHIPPED | OUTPATIENT
Start: 2024-02-21 | End: 2024-02-27

## 2024-02-21 RX ORDER — PROPOFOL 10 MG/ML
INJECTION, EMULSION INTRAVENOUS AS NEEDED
Status: DISCONTINUED | OUTPATIENT
Start: 2024-02-21 | End: 2024-02-21

## 2024-02-21 RX ORDER — CEFAZOLIN 1 G/1
INJECTION, POWDER, FOR SOLUTION INTRAVENOUS AS NEEDED
Status: DISCONTINUED | OUTPATIENT
Start: 2024-02-21 | End: 2024-02-21

## 2024-02-21 RX ADMIN — FENTANYL CITRATE 100 MCG: 50 INJECTION, SOLUTION INTRAMUSCULAR; INTRAVENOUS at 09:30

## 2024-02-21 RX ADMIN — Medication 8 L/MIN: at 11:32

## 2024-02-21 RX ADMIN — POVIDONE-IODINE 1 APPLICATION: 5 SOLUTION TOPICAL at 07:09

## 2024-02-21 RX ADMIN — Medication 100 MCG: at 09:48

## 2024-02-21 RX ADMIN — TRANEXAMIC ACID 1000 MG: 1 INJECTION, SOLUTION INTRAVENOUS at 09:26

## 2024-02-21 RX ADMIN — KETOROLAC TROMETHAMINE 30 MG: 30 INJECTION, SOLUTION INTRAMUSCULAR at 11:01

## 2024-02-21 RX ADMIN — LIDOCAINE HYDROCHLORIDE 50 MG: 20 INJECTION, SOLUTION EPIDURAL; INFILTRATION; INTRACAUDAL; PERINEURAL at 09:30

## 2024-02-21 RX ADMIN — CEFAZOLIN 2 G: 1 INJECTION, POWDER, FOR SOLUTION INTRAMUSCULAR; INTRAVENOUS at 09:15

## 2024-02-21 RX ADMIN — Medication 200 MCG: at 09:54

## 2024-02-21 RX ADMIN — Medication 200 MCG: at 10:20

## 2024-02-21 RX ADMIN — Medication 200 MCG: at 10:10

## 2024-02-21 RX ADMIN — ROCURONIUM BROMIDE 20 MG: 10 INJECTION, SOLUTION INTRAVENOUS at 10:10

## 2024-02-21 RX ADMIN — PROPOFOL 70 MG: 10 INJECTION, EMULSION INTRAVENOUS at 10:54

## 2024-02-21 RX ADMIN — ONDANSETRON 4 MG: 2 INJECTION INTRAMUSCULAR; INTRAVENOUS at 09:30

## 2024-02-21 RX ADMIN — PROPOFOL 130 MG: 10 INJECTION, EMULSION INTRAVENOUS at 09:30

## 2024-02-21 RX ADMIN — SUGAMMADEX 200 MG: 100 INJECTION, SOLUTION INTRAVENOUS at 11:27

## 2024-02-21 RX ADMIN — DEXAMETHASONE SODIUM PHOSPHATE 4 MG: 4 INJECTION, SOLUTION INTRAMUSCULAR; INTRAVENOUS at 09:30

## 2024-02-21 RX ADMIN — ROCURONIUM BROMIDE 60 MG: 10 INJECTION, SOLUTION INTRAVENOUS at 09:30

## 2024-02-21 RX ADMIN — MIDAZOLAM HYDROCHLORIDE 2 MG: 1 INJECTION, SOLUTION INTRAMUSCULAR; INTRAVENOUS at 08:00

## 2024-02-21 RX ADMIN — CEFAZOLIN SODIUM 2 G: 2 INJECTION, SOLUTION INTRAVENOUS at 14:26

## 2024-02-21 RX ADMIN — SODIUM CHLORIDE, POTASSIUM CHLORIDE, SODIUM LACTATE AND CALCIUM CHLORIDE 100 ML/HR: 600; 310; 30; 20 INJECTION, SOLUTION INTRAVENOUS at 07:08

## 2024-02-21 ASSESSMENT — PAIN - FUNCTIONAL ASSESSMENT
PAIN_FUNCTIONAL_ASSESSMENT: 0-10
PAIN_FUNCTIONAL_ASSESSMENT: 0-10
PAIN_FUNCTIONAL_ASSESSMENT: VAS (VISUAL ANALOG SCALE)
PAIN_FUNCTIONAL_ASSESSMENT: 0-10

## 2024-02-21 ASSESSMENT — COLUMBIA-SUICIDE SEVERITY RATING SCALE - C-SSRS
1. IN THE PAST MONTH, HAVE YOU WISHED YOU WERE DEAD OR WISHED YOU COULD GO TO SLEEP AND NOT WAKE UP?: NO
6. HAVE YOU EVER DONE ANYTHING, STARTED TO DO ANYTHING, OR PREPARED TO DO ANYTHING TO END YOUR LIFE?: NO
2. HAVE YOU ACTUALLY HAD ANY THOUGHTS OF KILLING YOURSELF?: NO

## 2024-02-21 ASSESSMENT — PAIN SCALES - GENERAL
PAINLEVEL_OUTOF10: 0 - NO PAIN
PAINLEVEL_OUTOF10: 0 - NO PAIN
PAINLEVEL_OUTOF10: 7
PAINLEVEL_OUTOF10: 0 - NO PAIN
PAIN_LEVEL: 0
PAINLEVEL_OUTOF10: 0 - NO PAIN

## 2024-02-21 ASSESSMENT — PAIN DESCRIPTION - DESCRIPTORS: DESCRIPTORS: ACHING

## 2024-02-21 ASSESSMENT — ACTIVITIES OF DAILY LIVING (ADL): HOME_MANAGEMENT_TIME_ENTRY: 15

## 2024-02-21 NOTE — OP NOTE
Left Shoulder Total Arthroplasty vs. Reverse Total Shoulder Arthroplasty (L) Operative Note     Date: 2024  OR Location: [facility] A OR    Name: Jordon Waddell, : 1950, Age: 73 y.o., MRN: [id number], Sex: male    Diagnosis  Pre-op Diagnosis     * Arthritis of left shoulder region [M19.012] Post-op Diagnosis     * Arthritis of left shoulder region [M19.012]     Procedures  Left Shoulder Total Arthroplasty vs. Reverse Total Shoulder Arthroplasty  57209 - [place] ARTHROPLASTY GLENOHUMERAL JOINT TOTAL SHOULDER      Surgeons      * Mike Salazar - Primary    Resident/Fellow/Other Assistant:  Surgeon(s) and Role:     * Miquel Carpenter MD - Assisting  Naty pérez cnp    Procedure Summary  Anesthesia: Consult  ASA: III  Anesthesia Staff: Anesthesiologist: Pablo Hadley MD  CRNA: JUAN Moon-CRNA  Estimated Blood Loss: 75 mL  Intra-op Medications:   Administrations occurring from 0830 to 1100 on 24:   Medication Name Total Dose   sodium chloride 0.9 % irrigation solution 3,000 mL   lactated Ringer's infusion Cannot be calculated              Anesthesia Record               Intraprocedure I/O Totals          Intake    Tranexamic Acid 0.00 mL    The total shown is the total volume documented since Anesthesia Start was filed.    lactated Ringer's infusion 1000.00 mL    Total Intake 1000 mL       Output    Est. Blood Loss 100 mL    Total Output 100 mL       Net    Net Volume 900 mL          Specimen: No specimens collected     Staff:   Circulator: Kareem Thomas RN  Relief Circulator: Bianca Joseph RN  Relief Scrub: Erika Julian  Scrub Person: [facility]         Drains and/or Catheters: * None in log *    Tourniquet Times:         Implants:  Implants       Type Name Action Serial No.      Joint Knee BONE CEMENT, CMW 2 , FAST SET, 20G - SNA - [id number] Implanted NA     Joint GLENOID, CIRCULAR, W/IN-LINE PEG, 24 X 6 MM - SNA - [id number] Implanted NA     Joint Shoulder HUMERAL STEMLESS,  28MM SIZE 0 Implanted NA     Joint HUMERAL, OFFSET HEAD, 18 X 48MM, R30 ARTICULATION - SNA - [id number] Implanted NA              Findings: Consistent with diagnosis intact rotator cuff end-stage arthritis longitudinal tear of the biceps    Indications: Jordon Waddell is an 73 y.o. male who is having surgery for Arthritis of left shoulder region [M19.012].  Longitudinal tear of the biceps    The patient was seen in the preoperative area. The risks, benefits, complications, treatment options, non-operative alternatives, expected recovery and outcomes were discussed with the patient. The possibilities of reaction to medication, pulmonary aspiration, injury to surrounding structures, bleeding, recurrent infection, the need for additional procedures, failure to diagnose a condition, and creating a complication requiring transfusion or operation were discussed with the patient. The patient concurred with the proposed plan, giving informed consent.  The site of surgery was properly noted/marked if necessary per policy. The patient has been actively warmed in preoperative area. Preoperative antibiotics have been ordered and given within 1 hours of incision. Venous thrombosis prophylaxis have been ordered including bilateral sequential compression devices    Procedure Details:    Implants:    History:  failed conservative management including injections, therapy, and anti-inflammatories.  Risks, benefits, and alternatives of surgery were discussed at length.  understood the risks of surgery and wished to proceed.    OPERATIVE REPORT:    On the day of surgery, they were seen in preoperative holding area, identified as the correct patient.  shoulder was identified and marked as the correct operative site.  Consent was signed.  Shoulder was marked.  Interscalene nerve block was performed.  They were taken to the operating room on a standard [facility].  A huddle was ensued ensuring she was the correct patient, shoulder was  the correct operative site.  All were in agreement with the correct shoulder.   intubated and sedated after.  Antibiotics and tranexamic acid were given as indicated.  Positioned in 60-degree beach chair position.  All bony prominences were well padded.  Head was held in neutral position by a [employer] head chacon.  We prepped and draped in standard fashion.  Ioban and space suits were used to increase sterility.  We made a standard deltopectoral incision.  Protected the cephalic vein throughout the course of the case laterally.  We released the subdeltoid space.    We then released the conjoint tendon laterally, identified circumflex vessels, and ligated them in standard fashion.  We palpated the axillary nerve and protected throughout the course of the case.  We identified the biceps tendon.  After internally rotating, released it up to the supraglenoid tubercle.  We tenodesed it to the superior border of the pectoralis major which we released a very small portion of.  We then did an osteotomy of the subscapularis to do circumferential capsular release.  We removed the osteophytes after dislocating.  We made our standard anatomic cut.  Following this, we then placed a cup protector and exposed the glenoid.  We released the labrum from 10 o'clock to 6 o'clock.  we excised the anterior capsule. We preserved the posterior aspect.  We released the capsule down to the Matsen point.  We then placed our standard retractors.  We used the inset glenoid, corrected about 5 degrees of retroversion.   standard reaming took place.  We drilled the superior inferior drill, copiously irrigated the wound, pulse lavaged.  Antibiotic cement was placed on the implant and in the 3 holes.  We impacted the implant until the cement hardened.  We turned our attention back to the humerus.  We placed a trial head.  Excellent 50, 50 shuck.  We removed all trial implants, placed our drill holes 3 laterally, 2 medial, and suture tapes were used. we  also used arthrex cerclage thru the osteotomy bed and lateral to biceps groove.   We impacted the implant into place. It came down quite nicely.  We copiously irrigated the wound.  We reduced the shoulder.  Excellent 50, 50 shuck.  Closed the subscapularis with the rotator interval stitch, medial row stitch, and 4 transosseous suture and cerclage tape.  We copiously irrigated the wound again.  Sue was placed in the deep space.  0-Vicryl, 2-0 Vicryl, running prolene and Dermabond completed the closure.  I was present and scrubbed for all critical portions of the procedure.      Please note that we will be billing for my nurse practitioner's first assist as she was critical in the center for successful completion of the case including positioning of the body, retraction, suture management, placement of the implants and wound closure. There was no qualified resident available for the entire case  Size zero 48 mm head 24 standard baseplate with 2 mm posteriorly 4 mm anteriorly of inset no, is a subchondral bone 40 mm of Arthrex torque  Complications:  None; patient tolerated the procedure well.    Disposition: PACU - hemodynamically stable.  Condition: stable         Additional Details: None    Attending Attestation: I was present and scrubbed for the entire procedure.    Mike Salazar  Phone Number: [phone]

## 2024-02-21 NOTE — SIGNIFICANT EVENT
Patient arrived to [place] after procedure with Anesthesia and procedure RN, procedure discussed, plan reviewed, VSS

## 2024-02-21 NOTE — ANESTHESIA POSTPROCEDURE EVALUATION
Patient: Jordon Waddell    Procedure Summary       Date: 02/21/24 Room / Location: [facility] A OR 12 / [facility] A OR    Anesthesia Start: 0900 Anesthesia Stop: 1136    Procedure: Left Shoulder Total Arthroplasty vs. Reverse Total Shoulder Arthroplasty (Left: Shoulder) Diagnosis:       Arthritis of left shoulder region      (Arthritis of left shoulder region [M19.012])    Surgeons: Mike Salazar MD Responsible Provider: Pablo Hadley MD    Anesthesia Type: general ASA Status: 3            Anesthesia Type: general    Vitals Value Taken Time   /62 02/21/24 1201   Temp 36.7 °C (98.1 °F) 02/21/24 1132   Pulse 80 02/21/24 1206   Resp 14 02/21/24 1206   SpO2 92 % 02/21/24 1206   Vitals shown include unvalidated device data.    Anesthesia Post Evaluation    Patient location during evaluation: bedside  Patient participation: complete - patient cannot participate  Level of consciousness: awake  Pain score: 0  Pain management: adequate  Airway patency: patent  Cardiovascular status: acceptable  Respiratory status: acceptable  Hydration status: acceptable  Postoperative Nausea and Vomiting: none        Pt encouraged to breathe deeply and cough.    No notable events documented.

## 2024-02-21 NOTE — ANESTHESIA PROCEDURE NOTES
Peripheral Block    Patient location during procedure: pre-op  Start time: 2/21/2024 8:00 AM  End time: 2/21/2024 8:15 AM  Reason for block: procedure for pain, at surgeon's request and post-op pain management  Staffing  Performed: attending   Authorized by: Pablo Hadley MD    Performed by: Pablo Hadley MD  Preanesthetic Checklist  Completed: patient identified, IV checked, site marked, risks and benefits discussed, surgical consent, monitors and equipment checked, pre-op evaluation and timeout performed   Timeout performed at: 2/21/2024 8:00 AM  Peripheral Block  Patient position: sitting  Prep: ChloraPrep  Patient monitoring: heart rate and continuous pulse ox  Block type: interscalene  Laterality: left  Injection technique: single-shot  Guidance: ultrasound guided  Local infiltration: lidocaine  Needle  Needle type: short-bevel   Needle gauge: 22 G  Needle length: 5 cm  Needle localization: ultrasound guidance     image stored in chart  Test dose: negative  Assessment  Injection assessment: negative aspiration for heme, no paresthesia on injection, incremental injection and local visualized surrounding nerve on ultrasound  Paresthesia pain: none  Heart rate change: no  Slow fractionated injection: yes  Additional Notes  Bupivacaine 0.375% 30ml + lidocaine 2% 10ml + decadron 4mg + NaHCO3 1ml used for block.  Versed 2mg iv given.  Pt ariel proc well.

## 2024-02-21 NOTE — ANESTHESIA PREPROCEDURE EVALUATION
Patient: Jordon Waddell    Procedure Information       Date/Time: 02/21/24 0830    Procedure: Left Shoulder Total Arthroplasty vs. Reverse Total Shoulder Arthroplasty (Left: Shoulder)    Location: [facility] OR  / [facility] A OR    Surgeons: Mike Salazar MD            Relevant Problems   Anesthesia (within normal limits)      Cardiovascular   (+) Benign systolic hypertension   (+) Hyperlipemia      Endocrine   (+) Diabetes mellitus, type 2 (CMS/HCC)   (+) Obesity, morbid (CMS/HCC)   (+) Obesity, unspecified      GI (within normal limits)      /Renal   (+) Stage 3a chronic kidney disease (CMS/HCC)      Neuro/Psych (within normal limits)      Pulmonary   (+) Pneumonia (remote)   (+) Sleep apnea, obstructive (Noncompliant w CPAP)      GI/Hepatic (within normal limits)      Hematology (within normal limits)      Musculoskeletal   (+) Chronic osteoarthritis      Other   (+) Arthritis of left hip   (+) Shoulder arthritis       Clinical information reviewed:    Allergies  Meds               NPO Detail:  NPO/Void Status  Carbohydrate Drink Given Prior to Surgery? : N  Date of Last Liquid: 02/21/24  Time of Last Liquid: 0445  Date of Last Solid: 02/20/24  Time of Last Solid: 2100  Last Intake Type: Clear fluids (water)  Time of Last Void: 0600         Physical Exam    Airway  Mallampati: III     Cardiovascular    Dental        Pulmonary    Abdominal            Anesthesia Plan    History of general anesthesia?: yes  History of complications of general anesthesia?: no    ASA 3     general     intravenous induction   Anesthetic plan and risks discussed with patient.

## 2024-02-21 NOTE — ANESTHESIA PROCEDURE NOTES
Airway  Date/Time: 2/21/2024 9:16 AM  Urgency: elective    Airway not difficult    Staffing  Performed: CRNA   Authorized by: Pablo Hadley MD    Performed by: JUAN Moon-WALLACE  Patient location during procedure: OR    Indications and Patient Condition  Indications for airway management: anesthesia and airway protection  Spontaneous Ventilation: absent  Sedation level: deep  Preoxygenated: yes  Patient position: sniffing  MILS maintained throughout  Mask difficulty assessment: 1 - vent by mask    Final Airway Details  Final airway type: endotracheal airway      Successful airway: ETT  Cuffed: yes   Successful intubation technique: direct laryngoscopy  Facilitating devices/methods: intubating stylet and cricoid pressure  Endotracheal tube insertion site: oral  Blade: Ryley  Blade size: #4  ETT size (mm): 7.5  Cormack-Lehane Classification: grade I - full view of glottis  Placement verified by: chest auscultation and capnometry   Cuff volume (mL): 6  Measured from: lips  ETT to lips (cm): 23  Number of attempts at approach: 1  Ventilation between attempts: BVM    Additional Comments  Smooth IV induction, atraumatic DVL/intubation, teeth/lips intact.

## 2024-02-21 NOTE — PROGRESS NOTES
Occupational Therapy                 Therapy Communication Note    Patient Name: Jordon Waddell  MRN: [id number]  Today's Date: 2/21/2024     Discipline: Occupational Therapy    (OT eval complete in PACU. Refer to hard chart for details.)

## 2024-02-21 NOTE — BRIEF OP NOTE
Date: 2024  OR Location: [facility] OR    Name: Jordon Waddell, : 1950, Age: 73 y.o., MRN: [id number], Sex: male    Diagnosis  Pre-op Diagnosis     * Arthritis of left shoulder region [M19.012] Post-op Diagnosis     * Arthritis of left shoulder region [M19.012]     Procedures  Left Shoulder Total Arthroplasty vs. Reverse Total Shoulder Arthroplasty  70765 - [place] ARTHROPLASTY GLENOHUMERAL JOINT TOTAL SHOULDER      Surgeons      * Mike Salazar - Primary    Resident/Fellow/Other Assistant:  Surgeon(s) and Role:     * Miquel Carpenter MD - Assisting    Procedure Summary  Anesthesia: Consult  ASA: III  Anesthesia Staff: Anesthesiologist: Pablo Hadley MD  CRNA: JUAN Moon-CRNA  Estimated Blood Loss: 100mL  Intra-op Medications:   Administrations occurring from 0830 to 1100 on 24:   Medication Name Total Dose   sodium chloride 0.9 % irrigation solution 3,000 mL   lactated Ringer's infusion Cannot be calculated              Anesthesia Record               Intraprocedure I/O Totals          Intake    Tranexamic Acid 0.00 mL    The total shown is the total volume documented since Anesthesia Start was filed.    lactated Ringer's infusion 1000.00 mL    Total Intake 1000 mL       Output    Est. Blood Loss 100 mL    Total Output 100 mL       Net    Net Volume 900 mL          Specimen: No specimens collected     Staff:   Circulator: Kareem Thomas RN  Relief Circulator: Bianca Joseph RN  Relief Scrub: Erika Julian  Scrub Person: [facility]          Findings: c/w diagnosis    Complications:  None; patient tolerated the procedure well.     Disposition: PACU - hemodynamically stable.  Condition: stable  Specimens Collected: No specimens collected  Attending Attestation: I was present and scrubbed for the entire procedure.    Mike Salazar  Phone Number: [phone]

## 2024-03-05 ENCOUNTER — HOSPITAL ENCOUNTER (OUTPATIENT)
Dept: RADIOLOGY | Facility: HOSPITAL | Age: 74
Discharge: HOME | End: 2024-03-05
Payer: MEDICARE

## 2024-03-05 ENCOUNTER — OFFICE VISIT (OUTPATIENT)
Dept: ORTHOPEDIC SURGERY | Facility: HOSPITAL | Age: 74
End: 2024-03-05
Payer: MEDICARE

## 2024-03-05 VITALS — WEIGHT: 217 LBS | BODY MASS INDEX: 34.06 KG/M2 | HEIGHT: 67 IN

## 2024-03-05 DIAGNOSIS — M19.012 ARTHRITIS OF LEFT SHOULDER REGION: ICD-10-CM

## 2024-03-05 DIAGNOSIS — Z96.612 STATUS POST REPLACEMENT OF LEFT SHOULDER JOINT: Primary | ICD-10-CM

## 2024-03-05 PROCEDURE — 1157F ADVNC CARE PLAN IN RCRD: CPT | Performed by: NURSE PRACTITIONER

## 2024-03-05 PROCEDURE — 73030 X-RAY EXAM OF SHOULDER: CPT | Mod: LT

## 2024-03-05 PROCEDURE — 1159F MED LIST DOCD IN RCRD: CPT | Performed by: NURSE PRACTITIONER

## 2024-03-05 PROCEDURE — 1126F AMNT PAIN NOTED NONE PRSNT: CPT | Performed by: NURSE PRACTITIONER

## 2024-03-05 PROCEDURE — 99024 POSTOP FOLLOW-UP VISIT: CPT | Performed by: NURSE PRACTITIONER

## 2024-03-05 PROCEDURE — 3044F HG A1C LEVEL LT 7.0%: CPT | Performed by: NURSE PRACTITIONER

## 2024-03-05 PROCEDURE — 73030 X-RAY EXAM OF SHOULDER: CPT | Mod: LEFT SIDE | Performed by: RADIOLOGY

## 2024-03-05 PROCEDURE — 1160F RVW MEDS BY RX/DR IN RCRD: CPT | Performed by: NURSE PRACTITIONER

## 2024-03-05 PROCEDURE — 1036F TOBACCO NON-USER: CPT | Performed by: NURSE PRACTITIONER

## 2024-03-05 ASSESSMENT — PAIN SCALES - GENERAL: PAINLEVEL_OUTOF10: 1

## 2024-03-05 ASSESSMENT — PAIN - FUNCTIONAL ASSESSMENT: PAIN_FUNCTIONAL_ASSESSMENT: 0-10

## 2024-03-05 ASSESSMENT — PAIN DESCRIPTION - DESCRIPTORS: DESCRIPTORS: ACHING

## 2024-03-05 NOTE — PROGRESS NOTES
Provider Impression/Assessment:  Jordon Waddell is a pleasant 73 y.o. male who is accompanied by his wife today. Returning to clinic for their first postoperative visit. They are status post left anatomic total shoulder replacement on 2/21/24.    Patient Discussion/Plan:  Jordon Waddell is doing very well since surgery. We encouraged them to be very careful with their shoulder over the next 6 weeks. They understand that the rotator cuff needs to heal over the next 4-6 weeks. They will continue doing gentle range of motion exercises of shoulder and elbow at this time. No lifting more than a coffee cup at this time with minimal active motion of the shoulder.     In 2 weeks time they will initiate physical therapy for more range of motion of the shoulder and some minimal light strengthening of the scapular plane. Prescription was given to patient today for therapy with limits on external rotation. They will take extreme caution with external rotation and not going past 30 degrees for the first 4 weeks of therapy. Active range of motion as tolerated with specified limits. We also talked about scapular stabilizing exercises that can be initiated right away now at home. They have the handout with exercises that was given prior to discharge from hospital. They were also given a detailed post operative protocol following total shoulder replacement today. In one month they can likely start lifting up to 5 pounds at home.     Patient should not visit the Dentist for 3 months after shoulder replacement, unless an emergency. Patient understands the need for lifelong prophylactic oral antibiotic prior to dental work in the future. They understand that their Dentist or our team can prescribe the antibiotics for them in the future.    We have scheduled to have Jordon Waddell see Dr Salazar in 6 weeks for a repeat clinical check and with likely progression to more aggressive range of motion and strengthening at that time. No repeat  imaging needed at that time.     All questions were answered today to their satisfaction and they know how to reach our office if needed prior to the  next scheduled visit with our office.      Patient was discussed with Dr Salazar and he agrees with the above plan.    Should you have any questions or concerns after your visit today, please do not hesitate to call the office at [phone]. I am honored to be a provider on your health care team and remain dedicated to helping you achieve your healthcare goals    -------------------------------------------------------------------------------------------------  CHIEF COMPLAINT:  POV: LEFT TSR  DOS: 2/21/24    History of Present Illness:  JORDON WADDELL returns to clinic for his first postoperative visit. He is 13 days S/P left reverse total shoulder replacement, done 02/21/24. XR today. Sutures removed today. He is doing very well. Devontes pain is well-managed. He has discontinued all pain medication. He wears his sling when he leaves the house and when he is moving around the house doing activities.     Jordon Waddell reports doing well after surgery. Pain is well managed. Denies use of narcotic pain medication at this time or any Over-the-counter pain medication. Using ice machine daily. Sleeping without difficulty. Denies redness or warmth at incision site. Denies any fever, chills, or paresthesia. They have been compliant with restrictions. Presents today wearing their shoulder sling. Doing well with daily home therapy for passive shoulder exercises and active elbow, wrist and hand exercises. 1 out of 10 pain on average.     Remains off work from his position at [employer] for likely the next 3 months while he recovers.     Review of Systems:   Review of systems for all 14 systems is negative for complaint except for the above noted findings in the history of present illness.    Family, social, and medical histories are obtained and reviewed.    Allergies:  Allergies    Allergen Reactions    Penicillins Unknown     Childhood        Medications:    Current Outpatient Medications:     amLODIPine (Norvasc) 5 mg tablet, Take 1 tablet (5 mg) by mouth once daily., Disp: 90 tablet, Rfl: 1    ammonium lactate (Lac-Hydrin) 12 % lotion, Apply topically 2 times a day., Disp: 225 g, Rfl: 1    aspirin 81 mg EC tablet, TAKE 1 TABLET DAILY AS DIRECTED., Disp: , Rfl:     cholecalciferol (Vitamin D-3) 25 MCG (1000 UT) tablet, TAKE 1 TABLET DAILY., Disp: , Rfl:     cyanocobalamin (Vitamin B-12) 100 mcg tablet, Take 1 tablet (100 mcg) by mouth once daily., Disp: , Rfl:     glimepiride (Amaryl) 4 mg tablet, Take 1 tablet (4 mg) by mouth once daily., Disp: 90 tablet, Rfl: 1    krill-om-3-dha-epa-phospho-ast (krill oil) 1,593-170-56-80 mg capsule, 3 tablets by mouth 2 times daily, Disp: , Rfl:     lisinopriL-hydrochlorothiazide 20-12.5 mg tablet, Take 2 tablets by mouth once daily. (Patient taking differently: Take 1 tablet by mouth 2 times a day.), Disp: 180 tablet, Rfl: 1    meloxicam (Mobic) 15 mg tablet, Take 1 tablet (15 mg) by mouth once daily., Disp: 90 tablet, Rfl: 1    OneTouch Ultra Test strip, USE 1 STRIP Daily, Disp: , Rfl:     rosuvastatin (Crestor) 40 mg tablet, Take 1 tablet (40 mg) by mouth once daily at bedtime., Disp: 90 tablet, Rfl: 1    SITagliptin phos-metformin (Janumet) 50-1,000 mg tablet, Take 1 tablet by mouth 2 times a day., Disp: 180 tablet, Rfl: 1    Diagnoses/Problems:  Left shoulder arthritis    Physical Examination:  Examination of left shoulder reveals incision is dry, intact and healing well. Very minimal swelling. Mild ecchymosis. No erythema or warmth. Nonabsorbable suture was removed today. Surgical glue intact. Neurovascularly intact distally. 5 out of 5 wrist, hand and thumb flexion and extension without pain. Full range of motion of elbow. They can actively elevate to about 90° of forward elevation, passively gets better motion while lying down, 120°. Stiffness  with external rotation, expected to 10-20° actively. Have not attempted internal rotation     Results/Imaging:  Independent review of the imaging today of LEFT shoulder show excellent alignment status post anatomic total shoulder replacement. No evidence of fracture, dislocation of other acute findings. I personally spent time viewing digital images of the radiology testing with the patient. Radiology results discussed with Dr. Salazar. Axillary view today. Tuberosity is in an anatomic position on axial imaging.        *While intending to generate a timely document that accurately reflects the content of the visit, no guarantee can be provided that every grammatical or spelling mistake has been or will be identified or corrected. Thank you for your understanding.

## 2024-03-12 ENCOUNTER — APPOINTMENT (OUTPATIENT)
Dept: ORTHOPEDIC SURGERY | Facility: HOSPITAL | Age: 74
End: 2024-03-12
Payer: MEDICARE

## 2024-03-19 ENCOUNTER — APPOINTMENT (OUTPATIENT)
Dept: ORTHOPEDIC SURGERY | Facility: HOSPITAL | Age: 74
End: 2024-03-19
Payer: MEDICARE

## 2024-04-14 DIAGNOSIS — I10 HYPERTENSION, UNSPECIFIED TYPE: ICD-10-CM

## 2024-04-14 DIAGNOSIS — Z00.00 HEALTH CARE MAINTENANCE: ICD-10-CM

## 2024-04-14 RX ORDER — LISINOPRIL AND HYDROCHLOROTHIAZIDE 12.5; 2 MG/1; MG/1
1 TABLET ORAL 2 TIMES DAILY
Qty: 180 TABLET | Refills: 1 | Status: SHIPPED | OUTPATIENT
Start: 2024-04-14

## 2024-04-14 RX ORDER — MELOXICAM 15 MG/1
15 TABLET ORAL DAILY
Qty: 90 TABLET | Refills: 1 | Status: SHIPPED | OUTPATIENT
Start: 2024-04-14

## 2024-04-14 RX ORDER — AMLODIPINE BESYLATE 5 MG/1
5 TABLET ORAL DAILY
Qty: 90 TABLET | Refills: 1 | Status: SHIPPED | OUTPATIENT
Start: 2024-04-14

## 2024-04-14 NOTE — PROGRESS NOTES
Subjective    Patient ID: Jordon Waddell is a 73 y.o. male.    Chief Complaint: No chief complaint on file.     Last Surgery: L TSR  Last Surgery Date: 02/21/24    HPI  Jordon is a 72 year-old right-hand-dominant male comes to see me for left shoulder pain for last 6 to 8 months. Does not remember an injury. 60% of normal shoulder. 100% on the right side. No therapy. 8 out of 10 pain at its worst 6 out of 10 on average. It wakes him up at night. No radiation of symptoms. Past medical history, surgical history, social history, family history were all reviewed and are as per the [facility] patient health history questionnaire form that I signed and scanned into the chart today. Review of systems for all 14 systems is negative except for the above noted findings in the history of present illness former smoker not currently smoking.     11/16/23  Jordon returns to the clinic today for a repeat follow up visit regarding his left shoulder pain. He is accompanied by his wife today.     He is here today with complaints of returned shoulder pain. His last injection only lasted him a few days and he would like to discuss his other options today.     He has had a knee replacement done with Dr. Suresh.     04/16/24   Jordon returns to the clinic for his first post operative visit. He is s/p L TSR done 02/21/24    He reports he is doing fairly well today. He is sleeping better.     Objective   Patient is a well-developed well-nourished gentleman in no acute distress. breathes with normal chest rises. pupils are round and symmetric today. Awake alert and oriented x3. Examination of the right shoulder today reveals the skin to be intact. There is no sign any atrophy lesions or abrasions. There is no pain to palpation of the bony prominences. Cervical lymphadenopathy examined, and this was negative.     Patient had 5 out of 5 wrist flexors extension thumb extension bilaterally. Sensation was intact to light touch to median ulnar radial  axillary and musculocutaneous nerves bilaterally. Positive radial pulse bilaterally. Provocative maneuvers on the right side today were negative. Range of motion of the right shoulder revealed 0-170° of forward elevation. 0-60° of external rotation. And internal rotation up to T 12.  Examination of the left shoulder today reveals skin is intact. 150 degrees not passively correctable 40 degrees external Tatian not passively correctable internally rotates to lower lumbar spine. Negative modified belly press test. Pain with external Tatian 90 degrees abduction 0 degrees of abduction. Some crepitus.     Examination of the left shoulder revels well healing incisions. 40° of external rotation. He can internally rotate to this waist. - modified belly press test      Image Results:  X-rays from June 2023 show moderate to severe osteoarthritis with slight medialization of the joint line     Assessment/Plan   Encounter Diagnoses:  No diagnosis found.  Patient  2 months s/p left TSR done 02/21/24    He is doing excellent. We are going to begin some light strengthening. No lifting more than 10-15lbs. He may swing his golf clubs. He may return to work without restrictions now and was provided a note. He will follow up with Sugey in one month for a repeat clinical assessment at that time.     No orders of the defined types were placed in this encounter.    Follow up 1 month     Scribe Attestation  By signing my name below, IRita Scribe   attest that this documentation has been prepared under the direction and in the presence of Mike Salazar MD.

## 2024-04-16 ENCOUNTER — OFFICE VISIT (OUTPATIENT)
Dept: ORTHOPEDIC SURGERY | Facility: HOSPITAL | Age: 74
End: 2024-04-16
Payer: MEDICARE

## 2024-04-16 VITALS — HEIGHT: 67 IN | WEIGHT: 225 LBS | BODY MASS INDEX: 35.31 KG/M2

## 2024-04-16 DIAGNOSIS — Z96.612 STATUS POST REPLACEMENT OF LEFT SHOULDER JOINT: Primary | ICD-10-CM

## 2024-04-16 PROCEDURE — 3044F HG A1C LEVEL LT 7.0%: CPT | Performed by: ORTHOPAEDIC SURGERY

## 2024-04-16 PROCEDURE — 1160F RVW MEDS BY RX/DR IN RCRD: CPT | Performed by: ORTHOPAEDIC SURGERY

## 2024-04-16 PROCEDURE — 1157F ADVNC CARE PLAN IN RCRD: CPT | Performed by: ORTHOPAEDIC SURGERY

## 2024-04-16 PROCEDURE — 99024 POSTOP FOLLOW-UP VISIT: CPT | Performed by: ORTHOPAEDIC SURGERY

## 2024-04-16 PROCEDURE — 1159F MED LIST DOCD IN RCRD: CPT | Performed by: ORTHOPAEDIC SURGERY

## 2024-04-16 ASSESSMENT — PAIN - FUNCTIONAL ASSESSMENT: PAIN_FUNCTIONAL_ASSESSMENT: 0-10

## 2024-04-16 ASSESSMENT — PAIN SCALES - GENERAL: PAINLEVEL_OUTOF10: 0 - NO PAIN

## 2024-04-16 NOTE — LETTER
April 16, 2024     Patient: Jordon Waddell   YOB: 1950   Date of Visit: 4/16/2024       To Whom It May Concern:    It is my medical opinion that Jordon Waddell may return to full duty immediately with no restrictions.    If you have any questions or concerns, please don't hesitate to call.         Sincerely,        Mike Salazar MD    CC: No Recipients

## 2024-05-28 ENCOUNTER — APPOINTMENT (OUTPATIENT)
Dept: ORTHOPEDIC SURGERY | Facility: HOSPITAL | Age: 74
End: 2024-05-28
Payer: MEDICARE

## 2024-06-03 DIAGNOSIS — E11.9 TYPE 2 DIABETES MELLITUS WITHOUT COMPLICATION, UNSPECIFIED WHETHER LONG TERM INSULIN USE (MULTI): ICD-10-CM

## 2024-06-03 RX ORDER — GLIMEPIRIDE 4 MG/1
4 TABLET ORAL DAILY
Qty: 90 TABLET | Refills: 1 | Status: SHIPPED | OUTPATIENT
Start: 2024-06-03 | End: 2024-06-03 | Stop reason: SDUPTHER

## 2024-06-04 ENCOUNTER — APPOINTMENT (OUTPATIENT)
Dept: ORTHOPEDIC SURGERY | Facility: HOSPITAL | Age: 74
End: 2024-06-04
Payer: MEDICARE

## 2024-06-13 ENCOUNTER — APPOINTMENT (OUTPATIENT)
Dept: ORTHOPEDIC SURGERY | Facility: HOSPITAL | Age: 74
End: 2024-06-13
Payer: MEDICARE

## 2024-06-14 RX ORDER — GLIMEPIRIDE 4 MG/1
4 TABLET ORAL DAILY
Qty: 90 TABLET | Refills: 1 | Status: SHIPPED | OUTPATIENT
Start: 2024-06-14

## 2024-06-21 ENCOUNTER — APPOINTMENT (OUTPATIENT)
Dept: ORTHOPEDIC SURGERY | Facility: CLINIC | Age: 74
End: 2024-06-21
Payer: MEDICARE

## 2024-06-24 ENCOUNTER — APPOINTMENT (OUTPATIENT)
Dept: PRIMARY CARE | Facility: CLINIC | Age: 74
End: 2024-06-24
Payer: MEDICARE

## 2024-06-24 VITALS
DIASTOLIC BLOOD PRESSURE: 68 MMHG | WEIGHT: 226.8 LBS | BODY MASS INDEX: 35.6 KG/M2 | HEIGHT: 67 IN | SYSTOLIC BLOOD PRESSURE: 134 MMHG | RESPIRATION RATE: 16 BRPM | OXYGEN SATURATION: 97 % | HEART RATE: 78 BPM

## 2024-06-24 DIAGNOSIS — I10 HYPERTENSION, UNSPECIFIED TYPE: ICD-10-CM

## 2024-06-24 DIAGNOSIS — E66.01 OBESITY, MORBID (MULTI): ICD-10-CM

## 2024-06-24 DIAGNOSIS — I10 BENIGN SYSTOLIC HYPERTENSION: ICD-10-CM

## 2024-06-24 DIAGNOSIS — N18.31 STAGE 3A CHRONIC KIDNEY DISEASE (MULTI): ICD-10-CM

## 2024-06-24 DIAGNOSIS — E78.5 HYPERLIPIDEMIA, UNSPECIFIED HYPERLIPIDEMIA TYPE: ICD-10-CM

## 2024-06-24 DIAGNOSIS — E11.9 TYPE 2 DIABETES MELLITUS WITHOUT COMPLICATION, UNSPECIFIED WHETHER LONG TERM INSULIN USE (MULTI): ICD-10-CM

## 2024-06-24 DIAGNOSIS — Z12.5 PROSTATE CANCER SCREENING: ICD-10-CM

## 2024-06-24 DIAGNOSIS — Z23 NEED FOR SHINGLES VACCINE: ICD-10-CM

## 2024-06-24 DIAGNOSIS — E11.21 DIABETES MELLITUS WITH KIDNEY DISEASE (MULTI): ICD-10-CM

## 2024-06-24 DIAGNOSIS — E78.00 PURE HYPERCHOLESTEROLEMIA: ICD-10-CM

## 2024-06-24 DIAGNOSIS — Z00.00 ROUTINE GENERAL MEDICAL EXAMINATION AT HEALTH CARE FACILITY: Primary | ICD-10-CM

## 2024-06-24 DIAGNOSIS — E11.9 TYPE 2 DIABETES MELLITUS WITHOUT COMPLICATION, WITHOUT LONG-TERM CURRENT USE OF INSULIN (MULTI): ICD-10-CM

## 2024-06-24 LAB
ALBUMIN SERPL BCP-MCNC: 4.4 G/DL (ref 3.4–5)
ALP SERPL-CCNC: 70 U/L (ref 33–136)
ALT SERPL W P-5'-P-CCNC: 19 U/L (ref 10–52)
ANION GAP SERPL CALC-SCNC: 14 MMOL/L (ref 10–20)
AST SERPL W P-5'-P-CCNC: 17 U/L (ref 9–39)
BILIRUB SERPL-MCNC: 0.4 MG/DL (ref 0–1.2)
BUN SERPL-MCNC: 19 MG/DL (ref 6–23)
CALCIUM SERPL-MCNC: 10.1 MG/DL (ref 8.6–10.6)
CHLORIDE SERPL-SCNC: 105 MMOL/L (ref 98–107)
CHOLEST SERPL-MCNC: 111 MG/DL (ref 0–199)
CHOLESTEROL/HDL RATIO: 2.9
CO2 SERPL-SCNC: 28 MMOL/L (ref 21–32)
CREAT SERPL-MCNC: 1.38 MG/DL (ref 0.5–1.3)
EGFRCR SERPLBLD CKD-EPI 2021: 54 ML/MIN/1.73M*2
GLUCOSE SERPL-MCNC: 196 MG/DL (ref 74–99)
HDLC SERPL-MCNC: 38.9 MG/DL
LDLC SERPL CALC-MCNC: 46 MG/DL
NON HDL CHOLESTEROL: 72 MG/DL (ref 0–149)
POC HEMOGLOBIN A1C: 6 % (ref 4.2–6.5)
POTASSIUM SERPL-SCNC: 4.1 MMOL/L (ref 3.5–5.3)
PROT SERPL-MCNC: 6.6 G/DL (ref 6.4–8.2)
PSA SERPL-MCNC: 2.65 NG/ML
SODIUM SERPL-SCNC: 143 MMOL/L (ref 136–145)
TRIGL SERPL-MCNC: 131 MG/DL (ref 0–149)
TSH SERPL-ACNC: 3.95 MIU/L (ref 0.44–3.98)
VLDL: 26 MG/DL (ref 0–40)

## 2024-06-24 PROCEDURE — G0439 PPPS, SUBSEQ VISIT: HCPCS | Performed by: FAMILY MEDICINE

## 2024-06-24 PROCEDURE — 3048F LDL-C <100 MG/DL: CPT | Performed by: FAMILY MEDICINE

## 2024-06-24 PROCEDURE — 84443 ASSAY THYROID STIM HORMONE: CPT

## 2024-06-24 PROCEDURE — 1170F FXNL STATUS ASSESSED: CPT | Performed by: FAMILY MEDICINE

## 2024-06-24 PROCEDURE — 83036 HEMOGLOBIN GLYCOSYLATED A1C: CPT | Performed by: FAMILY MEDICINE

## 2024-06-24 PROCEDURE — 80053 COMPREHEN METABOLIC PANEL: CPT

## 2024-06-24 PROCEDURE — 4010F ACE/ARB THERAPY RXD/TAKEN: CPT | Performed by: FAMILY MEDICINE

## 2024-06-24 PROCEDURE — 99214 OFFICE O/P EST MOD 30 MIN: CPT | Performed by: FAMILY MEDICINE

## 2024-06-24 PROCEDURE — 36415 COLL VENOUS BLD VENIPUNCTURE: CPT

## 2024-06-24 PROCEDURE — G0103 PSA SCREENING: HCPCS

## 2024-06-24 PROCEDURE — 1124F ACP DISCUSS-NO DSCNMKR DOCD: CPT | Performed by: FAMILY MEDICINE

## 2024-06-24 PROCEDURE — 1157F ADVNC CARE PLAN IN RCRD: CPT | Performed by: FAMILY MEDICINE

## 2024-06-24 PROCEDURE — 1159F MED LIST DOCD IN RCRD: CPT | Performed by: FAMILY MEDICINE

## 2024-06-24 PROCEDURE — 3075F SYST BP GE 130 - 139MM HG: CPT | Performed by: FAMILY MEDICINE

## 2024-06-24 PROCEDURE — 80061 LIPID PANEL: CPT

## 2024-06-24 PROCEDURE — 3044F HG A1C LEVEL LT 7.0%: CPT | Performed by: FAMILY MEDICINE

## 2024-06-24 PROCEDURE — 3078F DIAST BP <80 MM HG: CPT | Performed by: FAMILY MEDICINE

## 2024-06-24 PROCEDURE — 1036F TOBACCO NON-USER: CPT | Performed by: FAMILY MEDICINE

## 2024-06-24 PROCEDURE — 1160F RVW MEDS BY RX/DR IN RCRD: CPT | Performed by: FAMILY MEDICINE

## 2024-06-24 RX ORDER — LANCETS
EACH MISCELLANEOUS
Qty: 100 EACH | Refills: 1 | Status: ON HOLD | OUTPATIENT
Start: 2024-06-24

## 2024-06-24 RX ORDER — DEXTROSE 4 G
1 TABLET,CHEWABLE ORAL DAILY
Qty: 1 EACH | Refills: 0 | Status: ON HOLD | OUTPATIENT
Start: 2024-06-24

## 2024-06-24 RX ORDER — GLIMEPIRIDE 4 MG/1
4 TABLET ORAL DAILY
Qty: 90 TABLET | Refills: 1 | Status: ON HOLD | OUTPATIENT
Start: 2024-06-24

## 2024-06-24 RX ORDER — AMLODIPINE BESYLATE 5 MG/1
5 TABLET ORAL DAILY
Qty: 90 TABLET | Refills: 1 | Status: ON HOLD | OUTPATIENT
Start: 2024-06-24

## 2024-06-24 RX ORDER — BLOOD SUGAR DIAGNOSTIC
1 STRIP MISCELLANEOUS DAILY
Qty: 100 STRIP | Refills: 1 | Status: ON HOLD | OUTPATIENT
Start: 2024-06-24

## 2024-06-24 RX ORDER — ROSUVASTATIN CALCIUM 40 MG/1
40 TABLET, COATED ORAL NIGHTLY
Qty: 90 TABLET | Refills: 1 | Status: ON HOLD | OUTPATIENT
Start: 2024-06-24

## 2024-06-24 RX ORDER — LISINOPRIL AND HYDROCHLOROTHIAZIDE 12.5; 2 MG/1; MG/1
1 TABLET ORAL 2 TIMES DAILY
Qty: 180 TABLET | Refills: 1 | Status: ON HOLD | OUTPATIENT
Start: 2024-06-24

## 2024-06-24 RX ORDER — SITAGLIPTIN AND METFORMIN HYDROCHLORIDE 1000; 50 MG/1; MG/1
1 TABLET, FILM COATED ORAL 2 TIMES DAILY
Qty: 180 TABLET | Refills: 1 | Status: ON HOLD | OUTPATIENT
Start: 2024-06-24

## 2024-06-24 ASSESSMENT — ACTIVITIES OF DAILY LIVING (ADL)
MANAGING_FINANCES: INDEPENDENT
BATHING: INDEPENDENT
GROCERY_SHOPPING: INDEPENDENT
DRESSING: INDEPENDENT
TAKING_MEDICATION: INDEPENDENT
DOING_HOUSEWORK: INDEPENDENT

## 2024-06-24 ASSESSMENT — PATIENT HEALTH QUESTIONNAIRE - PHQ9
1. LITTLE INTEREST OR PLEASURE IN DOING THINGS: NOT AT ALL
SUM OF ALL RESPONSES TO PHQ9 QUESTIONS 1 AND 2: 0
2. FEELING DOWN, DEPRESSED OR HOPELESS: NOT AT ALL

## 2024-06-24 NOTE — PROGRESS NOTES
"Subjective   Reason for Visit: Jordon Waddell is an 73 y.o. male here for a Medicare Wellness visit.     Past Medical, Surgical, and Family History reviewed and updated in chart.    Reviewed all medications by prescribing practitioner or clinical pharmacist (such as prescriptions, OTCs, herbal therapies and supplements) and documented in the medical record.    HPI  Patient is here for follow-up of hypertension hyperlipidemia has been taking meds as prescribed denies chest pain shortness of breath myalgias dizziness been eating healthy and trying to increase exercise  DM BG controlled. A1c 5.8 Denies low BG Eating less busy with work.   Patient Care Team:  Tae Colby MD as PCP - General  Maggie Whipple MD as PCP - [employer] PCP     Review of Systems   Constitutional:  Negative for chills and fever.   HENT: Negative.     Respiratory: Negative.     Cardiovascular: Negative.    Gastrointestinal: Negative.  Negative for nausea and vomiting.   Endocrine: Negative.    Genitourinary: Negative.    Musculoskeletal: Negative.    Skin: Negative.  Negative for rash.   Allergic/Immunologic: Negative.    Neurological: Negative.    Hematological: Negative.    Psychiatric/Behavioral: Negative.     All other systems reviewed and are negative.      Objective   Vitals:  /68   Pulse 78   Resp 16   Ht 1.702 m (5' 7\")   Wt 103 kg (226 lb 12.8 oz)   SpO2 97%   BMI 35.52 kg/m²       Physical Exam  Vitals and nursing note reviewed.   Constitutional:       Appearance: Normal appearance.   HENT:      Head: Normocephalic and atraumatic.      Right Ear: Tympanic membrane normal.      Left Ear: Tympanic membrane normal.      Nose: Nose normal.      Mouth/Throat:      Mouth: Mucous membranes are moist.   Eyes:      Pupils: Pupils are equal, round, and reactive to light.   Cardiovascular:      Rate and Rhythm: Normal rate and regular rhythm.      Pulses: Normal pulses.      Heart sounds: Normal heart sounds. "   Pulmonary:      Effort: Pulmonary effort is normal.      Breath sounds: Normal breath sounds.   Abdominal:      General: Abdomen is flat. Bowel sounds are normal.      Palpations: Abdomen is soft.   Musculoskeletal:         General: Normal range of motion.      Cervical back: Normal range of motion and neck supple.   Skin:     General: Skin is warm and dry.      Capillary Refill: Capillary refill takes less than 2 seconds.   Neurological:      General: No focal deficit present.      Mental Status: He is alert and oriented to person, place, and time.   Psychiatric:         Mood and Affect: Mood normal.         Assessment/Plan   Problem List Items Addressed This Visit       Benign systolic hypertension    Current Assessment & Plan     Patient's blood pressure is at goal of 130/85 or less. Condition is stable. Continue current medications and treatment plan.  I recommend that you exercise for 30-45 minutes 5 days a week.  I also recommend a balanced diet with fruits and vegetables every day, lean meats, and little fried foods. The DASH diet (you can find this online) is a good example of this.  BP at goal  C/w Lisinopril/hydrochlorothiazide          Hyperlipemia    Current Assessment & Plan     C/w rosuvasttain 40mg          Relevant Medications    rosuvastatin (Crestor) 40 mg tablet    Other Relevant Orders    Lipid Panel (Completed)    Comprehensive Metabolic Panel (Completed)    TSH with reflex to Free T4 if abnormal (Completed)    Stage 3a chronic kidney disease (Multi)    Current Assessment & Plan     Check labs avoid nephrotoxic meds           Obesity, morbid (Multi)    Current Assessment & Plan     Watch what you eat and include more vegetables on your plate.  Portion control and exercise will help in loosing weight .   It is recommended to get 150 mins of brisk exercise in a week . 150 mins of exercise per week will help in maintaining your current weight, if you are already working out . Exercise should make  your heart rate go up.   Calorie deficit ( spending more calories than eating ) will result in weight loss    A deficit of 500 calories per day in 7 days would results in 1lbs weight loss per week., Aim for 1-2 lbs weight loss per month.   You can reduce the calorie intake by 250 calories and spend 250 calories by working out which would give you a total deficit of 500 calories     No sugary/ sweetened beverages , portion control , dedicated physical activity atleast 5 times a week advised .            Diabetes mellitus, type 2 (Multi)    Relevant Medications    SITagliptin phos-metformin (Janumet) 50-1,000 mg tablet    glimepiride (Amaryl) 4 mg tablet    OneTouch Ultra Test strip    lancets misc    blood-glucose meter (OneTouch Ultra2 Meter) misc    Other Relevant Orders    TSH with reflex to Free T4 if abnormal (Completed)    POCT glycosylated hemoglobin (Hb A1C) manually resulted (Completed)    Routine general medical examination at health care facility - Primary    Prostate cancer screening    Relevant Orders    Prostate Specific Antigen, Screen (Completed)    Need for shingles vaccine    Diabetes mellitus with kidney disease (Multi)    Relevant Medications    SITagliptin phos-metformin (Janumet) 50-1,000 mg tablet    Hypertension    Relevant Medications    amLODIPine (Norvasc) 5 mg tablet    lisinopriL-hydrochlorothiazide 20-12.5 mg tablet

## 2024-06-25 DIAGNOSIS — I10 HYPERTENSION, UNSPECIFIED TYPE: ICD-10-CM

## 2024-06-26 ENCOUNTER — APPOINTMENT (OUTPATIENT)
Dept: RADIOLOGY | Facility: HOSPITAL | Age: 74
DRG: 064 | End: 2024-06-26
Payer: MEDICARE

## 2024-06-26 ENCOUNTER — APPOINTMENT (OUTPATIENT)
Dept: RADIOLOGY | Facility: HOSPITAL | Age: 74
End: 2024-06-26
Payer: MEDICARE

## 2024-06-26 ENCOUNTER — APPOINTMENT (OUTPATIENT)
Dept: CARDIOLOGY | Facility: HOSPITAL | Age: 74
End: 2024-06-26
Payer: MEDICARE

## 2024-06-26 ENCOUNTER — HOSPITAL ENCOUNTER (INPATIENT)
Facility: HOSPITAL | Age: 74
End: 2024-06-26
Attending: PSYCHIATRY & NEUROLOGY | Admitting: PSYCHIATRY & NEUROLOGY
Payer: MEDICARE

## 2024-06-26 ENCOUNTER — HOSPITAL ENCOUNTER (EMERGENCY)
Facility: HOSPITAL | Age: 74
Discharge: OTHER INSTITUTION | End: 2024-06-26
Attending: EMERGENCY MEDICINE
Payer: MEDICARE

## 2024-06-26 VITALS
WEIGHT: 230.82 LBS | RESPIRATION RATE: 17 BRPM | OXYGEN SATURATION: 97 % | DIASTOLIC BLOOD PRESSURE: 74 MMHG | SYSTOLIC BLOOD PRESSURE: 141 MMHG | HEART RATE: 89 BPM | HEIGHT: 70 IN | BODY MASS INDEX: 33.05 KG/M2 | TEMPERATURE: 98.1 F

## 2024-06-26 DIAGNOSIS — M79.605 PAIN OF LEFT LOWER EXTREMITY: Primary | ICD-10-CM

## 2024-06-26 DIAGNOSIS — M79.605 LEG PAIN, ANTERIOR, LEFT: ICD-10-CM

## 2024-06-26 DIAGNOSIS — I63.531 CEREBRAL INFARCTION DUE TO UNSPECIFIED OCCLUSION OR STENOSIS OF RIGHT POSTERIOR CEREBRAL ARTERY (MULTI): ICD-10-CM

## 2024-06-26 DIAGNOSIS — R41.82 AMS (ALTERED MENTAL STATUS): Primary | ICD-10-CM

## 2024-06-26 DIAGNOSIS — Z23 NEED FOR SHINGLES VACCINE: ICD-10-CM

## 2024-06-26 DIAGNOSIS — R60.0 LOCALIZED EDEMA: ICD-10-CM

## 2024-06-26 DIAGNOSIS — I82.4Z2 ACUTE DEEP VEIN THROMBOSIS (DVT) OF DISTAL VEIN OF LEFT LOWER EXTREMITY (MULTI): ICD-10-CM

## 2024-06-26 DIAGNOSIS — I62.9 INTRACRANIAL HEMORRHAGE (MULTI): ICD-10-CM

## 2024-06-26 LAB
ABO GROUP (TYPE) IN BLOOD: NORMAL
ALBUMIN SERPL BCP-MCNC: 3.7 G/DL (ref 3.4–5)
ALBUMIN SERPL BCP-MCNC: 4.5 G/DL (ref 3.4–5)
ALP SERPL-CCNC: 61 U/L (ref 33–136)
ALT SERPL W P-5'-P-CCNC: 20 U/L (ref 10–52)
ANION GAP SERPL CALC-SCNC: 15 MMOL/L (ref 10–20)
ANION GAP SERPL CALC-SCNC: 16 MMOL/L (ref 10–20)
ANTIBODY SCREEN: NORMAL
APTT PPP: 29 SECONDS (ref 27–38)
APTT PPP: 30 SECONDS (ref 27–38)
AST SERPL W P-5'-P-CCNC: 15 U/L (ref 9–39)
BASOPHILS # BLD AUTO: 0.02 X10*3/UL (ref 0–0.1)
BASOPHILS NFR BLD AUTO: 0.4 %
BILIRUB SERPL-MCNC: 0.5 MG/DL (ref 0–1.2)
BNP SERPL-MCNC: 51 PG/ML (ref 0–99)
BUN SERPL-MCNC: 21 MG/DL (ref 6–23)
BUN SERPL-MCNC: 21 MG/DL (ref 6–23)
CALCIUM SERPL-MCNC: 10.3 MG/DL (ref 8.6–10.6)
CALCIUM SERPL-MCNC: 9.6 MG/DL (ref 8.6–10.3)
CARDIAC TROPONIN I PNL SERPL HS: 13 NG/L (ref 0–20)
CARDIAC TROPONIN I PNL SERPL HS: 27 NG/L (ref 0–53)
CHLORIDE SERPL-SCNC: 101 MMOL/L (ref 98–107)
CHLORIDE SERPL-SCNC: 101 MMOL/L (ref 98–107)
CHOLEST SERPL-MCNC: 113 MG/DL (ref 0–199)
CHOLESTEROL/HDL RATIO: 3.2
CO2 SERPL-SCNC: 25 MMOL/L (ref 21–32)
CO2 SERPL-SCNC: 26 MMOL/L (ref 21–32)
CREAT SERPL-MCNC: 1.26 MG/DL (ref 0.5–1.3)
CREAT SERPL-MCNC: 1.49 MG/DL (ref 0.5–1.3)
EGFRCR SERPLBLD CKD-EPI 2021: 50 ML/MIN/1.73M*2
EGFRCR SERPLBLD CKD-EPI 2021: 61 ML/MIN/1.73M*2
EOSINOPHIL # BLD AUTO: 0.19 X10*3/UL (ref 0–0.4)
EOSINOPHIL NFR BLD AUTO: 4 %
ERYTHROCYTE [DISTWIDTH] IN BLOOD BY AUTOMATED COUNT: 12.6 % (ref 11.5–14.5)
ERYTHROCYTE [DISTWIDTH] IN BLOOD BY AUTOMATED COUNT: 12.6 % (ref 11.5–14.5)
EST. AVERAGE GLUCOSE BLD GHB EST-MCNC: 131 MG/DL
GLUCOSE BLD MANUAL STRIP-MCNC: 100 MG/DL (ref 74–99)
GLUCOSE BLD MANUAL STRIP-MCNC: 118 MG/DL (ref 74–99)
GLUCOSE BLD MANUAL STRIP-MCNC: 125 MG/DL (ref 74–99)
GLUCOSE BLD MANUAL STRIP-MCNC: 158 MG/DL (ref 74–99)
GLUCOSE SERPL-MCNC: 132 MG/DL (ref 74–99)
GLUCOSE SERPL-MCNC: 140 MG/DL (ref 74–99)
HBA1C MFR BLD: 6.2 %
HCT VFR BLD AUTO: 40.3 % (ref 41–52)
HCT VFR BLD AUTO: 41.3 % (ref 41–52)
HDLC SERPL-MCNC: 34.8 MG/DL
HGB BLD-MCNC: 13.4 G/DL (ref 13.5–17.5)
HGB BLD-MCNC: 14 G/DL (ref 13.5–17.5)
IMM GRANULOCYTES # BLD AUTO: 0.03 X10*3/UL (ref 0–0.5)
IMM GRANULOCYTES NFR BLD AUTO: 0.6 % (ref 0–0.9)
INR PPP: 1 (ref 0.9–1.1)
INR PPP: 1 (ref 0.9–1.1)
LDLC SERPL CALC-MCNC: 47 MG/DL
LYMPHOCYTES # BLD AUTO: 1.26 X10*3/UL (ref 0.8–3)
LYMPHOCYTES NFR BLD AUTO: 26.2 %
MCH RBC QN AUTO: 29.8 PG (ref 26–34)
MCH RBC QN AUTO: 30.2 PG (ref 26–34)
MCHC RBC AUTO-ENTMCNC: 33.3 G/DL (ref 32–36)
MCHC RBC AUTO-ENTMCNC: 33.9 G/DL (ref 32–36)
MCV RBC AUTO: 89 FL (ref 80–100)
MCV RBC AUTO: 90 FL (ref 80–100)
MONOCYTES # BLD AUTO: 0.53 X10*3/UL (ref 0.05–0.8)
MONOCYTES NFR BLD AUTO: 11 %
NEUTROPHILS # BLD AUTO: 2.78 X10*3/UL (ref 1.6–5.5)
NEUTROPHILS NFR BLD AUTO: 57.8 %
NON HDL CHOLESTEROL: 78 MG/DL (ref 0–149)
NRBC BLD-RTO: 0 /100 WBCS (ref 0–0)
NRBC BLD-RTO: 0 /100 WBCS (ref 0–0)
PHOSPHATE SERPL-MCNC: 2.9 MG/DL (ref 2.5–4.9)
PLATELET # BLD AUTO: 116 X10*3/UL (ref 150–450)
PLATELET # BLD AUTO: 143 X10*3/UL (ref 150–450)
POTASSIUM SERPL-SCNC: 3.8 MMOL/L (ref 3.5–5.3)
POTASSIUM SERPL-SCNC: 3.8 MMOL/L (ref 3.5–5.3)
PROT SERPL-MCNC: 6.1 G/DL (ref 6.4–8.2)
PROTHROMBIN TIME: 10.8 SECONDS (ref 9.8–12.8)
PROTHROMBIN TIME: 10.9 SECONDS (ref 9.8–12.8)
RBC # BLD AUTO: 4.49 X10*6/UL (ref 4.5–5.9)
RBC # BLD AUTO: 4.64 X10*6/UL (ref 4.5–5.9)
RH FACTOR (ANTIGEN D): NORMAL
SODIUM SERPL-SCNC: 137 MMOL/L (ref 136–145)
SODIUM SERPL-SCNC: 139 MMOL/L (ref 136–145)
TRIGL SERPL-MCNC: 154 MG/DL (ref 0–149)
VLDL: 31 MG/DL (ref 0–40)
WBC # BLD AUTO: 4.8 X10*3/UL (ref 4.4–11.3)
WBC # BLD AUTO: 6.7 X10*3/UL (ref 4.4–11.3)

## 2024-06-26 PROCEDURE — 86901 BLOOD TYPING SEROLOGIC RH(D): CPT | Performed by: STUDENT IN AN ORGANIZED HEALTH CARE EDUCATION/TRAINING PROGRAM

## 2024-06-26 PROCEDURE — 70450 CT HEAD/BRAIN W/O DYE: CPT

## 2024-06-26 PROCEDURE — 71045 X-RAY EXAM CHEST 1 VIEW: CPT

## 2024-06-26 PROCEDURE — 80069 RENAL FUNCTION PANEL: CPT | Performed by: STUDENT IN AN ORGANIZED HEALTH CARE EDUCATION/TRAINING PROGRAM

## 2024-06-26 PROCEDURE — 84484 ASSAY OF TROPONIN QUANT: CPT | Performed by: STUDENT IN AN ORGANIZED HEALTH CARE EDUCATION/TRAINING PROGRAM

## 2024-06-26 PROCEDURE — 2500000004 HC RX 250 GENERAL PHARMACY W/ HCPCS (ALT 636 FOR OP/ED): Performed by: STUDENT IN AN ORGANIZED HEALTH CARE EDUCATION/TRAINING PROGRAM

## 2024-06-26 PROCEDURE — 71045 X-RAY EXAM CHEST 1 VIEW: CPT | Performed by: RADIOLOGY

## 2024-06-26 PROCEDURE — 83036 HEMOGLOBIN GLYCOSYLATED A1C: CPT | Performed by: STUDENT IN AN ORGANIZED HEALTH CARE EDUCATION/TRAINING PROGRAM

## 2024-06-26 PROCEDURE — 99222 1ST HOSP IP/OBS MODERATE 55: CPT

## 2024-06-26 PROCEDURE — 84484 ASSAY OF TROPONIN QUANT: CPT | Performed by: EMERGENCY MEDICINE

## 2024-06-26 PROCEDURE — 96376 TX/PRO/DX INJ SAME DRUG ADON: CPT

## 2024-06-26 PROCEDURE — 70450 CT HEAD/BRAIN W/O DYE: CPT | Performed by: RADIOLOGY

## 2024-06-26 PROCEDURE — 85025 COMPLETE CBC W/AUTO DIFF WBC: CPT | Performed by: EMERGENCY MEDICINE

## 2024-06-26 PROCEDURE — 85610 PROTHROMBIN TIME: CPT | Performed by: EMERGENCY MEDICINE

## 2024-06-26 PROCEDURE — 36415 COLL VENOUS BLD VENIPUNCTURE: CPT | Performed by: EMERGENCY MEDICINE

## 2024-06-26 PROCEDURE — 70498 CT ANGIOGRAPHY NECK: CPT

## 2024-06-26 PROCEDURE — 36415 COLL VENOUS BLD VENIPUNCTURE: CPT | Performed by: STUDENT IN AN ORGANIZED HEALTH CARE EDUCATION/TRAINING PROGRAM

## 2024-06-26 PROCEDURE — 2020000001 HC ICU ROOM DAILY

## 2024-06-26 PROCEDURE — 80061 LIPID PANEL: CPT | Performed by: STUDENT IN AN ORGANIZED HEALTH CARE EDUCATION/TRAINING PROGRAM

## 2024-06-26 PROCEDURE — 70498 CT ANGIOGRAPHY NECK: CPT | Performed by: RADIOLOGY

## 2024-06-26 PROCEDURE — 85730 THROMBOPLASTIN TIME PARTIAL: CPT | Performed by: EMERGENCY MEDICINE

## 2024-06-26 PROCEDURE — 2500000001 HC RX 250 WO HCPCS SELF ADMINISTERED DRUGS (ALT 637 FOR MEDICARE OP): Performed by: STUDENT IN AN ORGANIZED HEALTH CARE EDUCATION/TRAINING PROGRAM

## 2024-06-26 PROCEDURE — 2500000004 HC RX 250 GENERAL PHARMACY W/ HCPCS (ALT 636 FOR OP/ED)

## 2024-06-26 PROCEDURE — 85610 PROTHROMBIN TIME: CPT | Performed by: STUDENT IN AN ORGANIZED HEALTH CARE EDUCATION/TRAINING PROGRAM

## 2024-06-26 PROCEDURE — 80053 COMPREHEN METABOLIC PANEL: CPT | Performed by: EMERGENCY MEDICINE

## 2024-06-26 PROCEDURE — 70496 CT ANGIOGRAPHY HEAD: CPT | Performed by: RADIOLOGY

## 2024-06-26 PROCEDURE — 2550000001 HC RX 255 CONTRASTS: Performed by: EMERGENCY MEDICINE

## 2024-06-26 PROCEDURE — 85027 COMPLETE CBC AUTOMATED: CPT | Performed by: STUDENT IN AN ORGANIZED HEALTH CARE EDUCATION/TRAINING PROGRAM

## 2024-06-26 PROCEDURE — 99291 CRITICAL CARE FIRST HOUR: CPT | Performed by: NEUROLOGICAL SURGERY

## 2024-06-26 PROCEDURE — 96375 TX/PRO/DX INJ NEW DRUG ADDON: CPT

## 2024-06-26 PROCEDURE — 82947 ASSAY GLUCOSE BLOOD QUANT: CPT | Mod: 91

## 2024-06-26 PROCEDURE — 99291 CRITICAL CARE FIRST HOUR: CPT | Mod: 25 | Performed by: EMERGENCY MEDICINE

## 2024-06-26 PROCEDURE — 2500000004 HC RX 250 GENERAL PHARMACY W/ HCPCS (ALT 636 FOR OP/ED): Performed by: EMERGENCY MEDICINE

## 2024-06-26 PROCEDURE — 96365 THER/PROPH/DIAG IV INF INIT: CPT | Mod: 59

## 2024-06-26 PROCEDURE — 82947 ASSAY GLUCOSE BLOOD QUANT: CPT

## 2024-06-26 PROCEDURE — 83880 ASSAY OF NATRIURETIC PEPTIDE: CPT | Performed by: STUDENT IN AN ORGANIZED HEALTH CARE EDUCATION/TRAINING PROGRAM

## 2024-06-26 PROCEDURE — 93005 ELECTROCARDIOGRAM TRACING: CPT

## 2024-06-26 PROCEDURE — 70496 CT ANGIOGRAPHY HEAD: CPT

## 2024-06-26 RX ORDER — INSULIN LISPRO 100 [IU]/ML
0-5 INJECTION, SOLUTION INTRAVENOUS; SUBCUTANEOUS EVERY 4 HOURS
Status: DISCONTINUED | OUTPATIENT
Start: 2024-06-26 | End: 2024-06-26 | Stop reason: SDUPTHER

## 2024-06-26 RX ORDER — ROSUVASTATIN CALCIUM 20 MG/1
40 TABLET, COATED ORAL NIGHTLY
Status: DISPENSED | OUTPATIENT
Start: 2024-06-26

## 2024-06-26 RX ORDER — SODIUM CHLORIDE 9 MG/ML
50 INJECTION, SOLUTION INTRAVENOUS CONTINUOUS
Status: DISCONTINUED | OUTPATIENT
Start: 2024-06-26 | End: 2024-06-28

## 2024-06-26 RX ORDER — HYDRALAZINE HYDROCHLORIDE 20 MG/ML
10 INJECTION INTRAMUSCULAR; INTRAVENOUS
Status: DISCONTINUED | OUTPATIENT
Start: 2024-06-26 | End: 2024-06-26 | Stop reason: SDUPTHER

## 2024-06-26 RX ORDER — METOCLOPRAMIDE 10 MG/1
10 TABLET ORAL EVERY 6 HOURS PRN
Status: DISCONTINUED | OUTPATIENT
Start: 2024-06-26 | End: 2024-06-27

## 2024-06-26 RX ORDER — CHOLECALCIFEROL (VITAMIN D3) 25 MCG
1000 TABLET ORAL DAILY
Status: DISPENSED | OUTPATIENT
Start: 2024-06-26

## 2024-06-26 RX ORDER — LABETALOL HYDROCHLORIDE 5 MG/ML
10 INJECTION, SOLUTION INTRAVENOUS EVERY 10 MIN PRN
Status: DISCONTINUED | OUTPATIENT
Start: 2024-06-26 | End: 2024-06-26 | Stop reason: SDUPTHER

## 2024-06-26 RX ORDER — RUTIN/HESP/BIOFLAV/C/HERBAL196 40-25-50MG
1 TABLET ORAL DAILY
Status: ON HOLD | COMMUNITY

## 2024-06-26 RX ORDER — ONDANSETRON 4 MG/1
4 TABLET, FILM COATED ORAL EVERY 8 HOURS PRN
Status: ACTIVE | OUTPATIENT
Start: 2024-06-26

## 2024-06-26 RX ORDER — INSULIN LISPRO 100 [IU]/ML
0-5 INJECTION, SOLUTION INTRAVENOUS; SUBCUTANEOUS EVERY 4 HOURS
Status: DISCONTINUED | OUTPATIENT
Start: 2024-06-26 | End: 2024-06-26

## 2024-06-26 RX ORDER — SODIUM CHLORIDE 9 MG/ML
75 INJECTION, SOLUTION INTRAVENOUS CONTINUOUS
Status: DISCONTINUED | OUTPATIENT
Start: 2024-06-26 | End: 2024-06-26 | Stop reason: SDUPTHER

## 2024-06-26 RX ORDER — LABETALOL HYDROCHLORIDE 5 MG/ML
10 INJECTION, SOLUTION INTRAVENOUS ONCE
Status: COMPLETED | OUTPATIENT
Start: 2024-06-26 | End: 2024-06-26

## 2024-06-26 RX ORDER — POLYETHYLENE GLYCOL 3350 17 G/17G
17 POWDER, FOR SOLUTION ORAL DAILY
Status: DISCONTINUED | OUTPATIENT
Start: 2024-06-26 | End: 2024-06-26 | Stop reason: SDUPTHER

## 2024-06-26 RX ORDER — AMLODIPINE BESYLATE 5 MG/1
5 TABLET ORAL DAILY
Status: DISCONTINUED | OUTPATIENT
Start: 2024-06-26 | End: 2024-06-27

## 2024-06-26 RX ORDER — METOCLOPRAMIDE HYDROCHLORIDE 5 MG/ML
10 INJECTION INTRAMUSCULAR; INTRAVENOUS EVERY 6 HOURS PRN
Status: DISCONTINUED | OUTPATIENT
Start: 2024-06-26 | End: 2024-06-27

## 2024-06-26 RX ORDER — NICARDIPINE HYDROCHLORIDE 0.2 MG/ML
1-15 INJECTION INTRAVENOUS CONTINUOUS PRN
Status: DISCONTINUED | OUTPATIENT
Start: 2024-06-26 | End: 2024-06-27

## 2024-06-26 RX ORDER — ACETAMINOPHEN 325 MG/1
650 TABLET ORAL EVERY 6 HOURS PRN
Status: DISCONTINUED | OUTPATIENT
Start: 2024-06-26 | End: 2024-06-26 | Stop reason: SDUPTHER

## 2024-06-26 RX ORDER — AMOXICILLIN 250 MG
2 CAPSULE ORAL 2 TIMES DAILY
Status: DISPENSED | OUTPATIENT
Start: 2024-06-26

## 2024-06-26 RX ORDER — ACETAMINOPHEN 325 MG/1
650 TABLET ORAL EVERY 4 HOURS PRN
Status: ACTIVE | OUTPATIENT
Start: 2024-06-26

## 2024-06-26 RX ORDER — MELOXICAM 15 MG/1
15 TABLET ORAL DAILY
Status: DISCONTINUED | OUTPATIENT
Start: 2024-06-26 | End: 2024-06-27

## 2024-06-26 RX ORDER — DIPHENHYDRAMINE HCL 25 MG
25 CAPSULE ORAL NIGHTLY PRN
Status: DISCONTINUED | OUTPATIENT
Start: 2024-06-26 | End: 2024-06-27

## 2024-06-26 RX ORDER — HYDRALAZINE HYDROCHLORIDE 20 MG/ML
10 INJECTION INTRAMUSCULAR; INTRAVENOUS
Status: DISPENSED | OUTPATIENT
Start: 2024-06-26 | End: 2024-06-28

## 2024-06-26 RX ORDER — INSULIN LISPRO 100 [IU]/ML
0-5 INJECTION, SOLUTION INTRAVENOUS; SUBCUTANEOUS
Status: DISCONTINUED | OUTPATIENT
Start: 2024-06-26 | End: 2024-06-26

## 2024-06-26 RX ORDER — DEXTROSE 50 % IN WATER (D50W) INTRAVENOUS SYRINGE
25
Status: DISCONTINUED | OUTPATIENT
Start: 2024-06-26 | End: 2024-06-27

## 2024-06-26 RX ORDER — INSULIN LISPRO 100 [IU]/ML
0-5 INJECTION, SOLUTION INTRAVENOUS; SUBCUTANEOUS EVERY 4 HOURS
Status: ACTIVE | OUTPATIENT
Start: 2024-06-27

## 2024-06-26 RX ORDER — DEXTROSE 50 % IN WATER (D50W) INTRAVENOUS SYRINGE
12.5
Status: ACTIVE | OUTPATIENT
Start: 2024-06-26

## 2024-06-26 RX ORDER — DEXTROSE 50 % IN WATER (D50W) INTRAVENOUS SYRINGE
12.5
Status: DISCONTINUED | OUTPATIENT
Start: 2024-06-26 | End: 2024-06-27

## 2024-06-26 RX ORDER — DIPHENHYDRAMINE HCL 25 MG
25 TABLET ORAL NIGHTLY PRN
Status: ON HOLD | COMMUNITY

## 2024-06-26 RX ORDER — NICARDIPINE HYDROCHLORIDE 0.2 MG/ML
INJECTION INTRAVENOUS
Status: COMPLETED
Start: 2024-06-26 | End: 2024-06-26

## 2024-06-26 RX ORDER — TALC
6 POWDER (GRAM) TOPICAL DAILY
Status: DISPENSED | OUTPATIENT
Start: 2024-06-26

## 2024-06-26 RX ORDER — METFORMIN HYDROCHLORIDE 1000 MG/1
1000 TABLET ORAL
Status: DISCONTINUED | OUTPATIENT
Start: 2024-06-27 | End: 2024-06-27

## 2024-06-26 RX ORDER — POLYETHYLENE GLYCOL 3350 17 G/17G
17 POWDER, FOR SOLUTION ORAL DAILY
Status: ACTIVE | OUTPATIENT
Start: 2024-06-26

## 2024-06-26 RX ORDER — DEXTROSE 50 % IN WATER (D50W) INTRAVENOUS SYRINGE
25
Status: ACTIVE | OUTPATIENT
Start: 2024-06-26

## 2024-06-26 RX ORDER — NICARDIPINE HYDROCHLORIDE 0.2 MG/ML
2.5-15 INJECTION INTRAVENOUS CONTINUOUS
Status: DISCONTINUED | OUTPATIENT
Start: 2024-06-26 | End: 2024-06-26 | Stop reason: HOSPADM

## 2024-06-26 RX ORDER — NICARDIPINE HYDROCHLORIDE 0.2 MG/ML
1-15 INJECTION INTRAVENOUS CONTINUOUS PRN
Status: DISCONTINUED | OUTPATIENT
Start: 2024-06-26 | End: 2024-06-26 | Stop reason: SDUPTHER

## 2024-06-26 RX ORDER — ONDANSETRON HYDROCHLORIDE 2 MG/ML
4 INJECTION, SOLUTION INTRAVENOUS EVERY 8 HOURS PRN
Status: ACTIVE | OUTPATIENT
Start: 2024-06-26

## 2024-06-26 RX ORDER — LABETALOL HYDROCHLORIDE 5 MG/ML
10 INJECTION, SOLUTION INTRAVENOUS EVERY 10 MIN PRN
Status: ACTIVE | OUTPATIENT
Start: 2024-06-26 | End: 2024-06-28

## 2024-06-26 RX ORDER — GLIMEPIRIDE 4 MG/1
4 TABLET ORAL DAILY
Status: DISCONTINUED | OUTPATIENT
Start: 2024-06-26 | End: 2024-06-27

## 2024-06-26 RX ORDER — ASPIRIN 81 MG/1
81 TABLET ORAL DAILY
Status: DISCONTINUED | OUTPATIENT
Start: 2024-06-26 | End: 2024-06-27

## 2024-06-26 ASSESSMENT — PAIN SCALES - GENERAL
PAINLEVEL_OUTOF10: 0 - NO PAIN

## 2024-06-26 ASSESSMENT — COLUMBIA-SUICIDE SEVERITY RATING SCALE - C-SSRS
1. IN THE PAST MONTH, HAVE YOU WISHED YOU WERE DEAD OR WISHED YOU COULD GO TO SLEEP AND NOT WAKE UP?: NO
2. HAVE YOU ACTUALLY HAD ANY THOUGHTS OF KILLING YOURSELF?: NO
2. HAVE YOU ACTUALLY HAD ANY THOUGHTS OF KILLING YOURSELF?: NO
6. HAVE YOU EVER DONE ANYTHING, STARTED TO DO ANYTHING, OR PREPARED TO DO ANYTHING TO END YOUR LIFE?: NO
6. HAVE YOU EVER DONE ANYTHING, STARTED TO DO ANYTHING, OR PREPARED TO DO ANYTHING TO END YOUR LIFE?: NO
1. IN THE PAST MONTH, HAVE YOU WISHED YOU WERE DEAD OR WISHED YOU COULD GO TO SLEEP AND NOT WAKE UP?: NO

## 2024-06-26 ASSESSMENT — PAIN - FUNCTIONAL ASSESSMENT
PAIN_FUNCTIONAL_ASSESSMENT: 0-10

## 2024-06-26 ASSESSMENT — ACTIVITIES OF DAILY LIVING (ADL): LACK_OF_TRANSPORTATION: NO

## 2024-06-26 NOTE — PROGRESS NOTES
Transitional Care Coordination Progress Note:  Plan per Medical/Surgical team: treatment of fall in shower, R/O CVA with IV labetalol, IV nicardipine, CTSCANs pending  Status: ED  Payor source: Shaq ROSALES  Discharge disposition: Home with wife   Potential Barriers: left sided weakness and left facial droop   ADOD: 6/28/2024   BROOKE Treviño RN, BSN Transitional Care Coordinator ED# [phone]      06/26/24 1036   Discharge Planning   Living Arrangements Spouse/significant other   Support Systems Spouse/significant other   Assistance Needed neuro work up   Type of Residence Private residence   Number of Stairs to Enter Residence 3   Number of Stairs Within Residence 0  (12 to basement)   Home or Post Acute Services None   Patient expects to be discharged to: Home with wife   Does the patient need discharge transport arranged? No   Financial Resource Strain   How hard is it for you to pay for the very basics like food, housing, medical care, and heating? Not hard   Housing Stability   In the last 12 months, was there a time when you were not able to pay the mortgage or rent on time? N   In the last 12 months, how many places have you lived? 1   In the last 12 months, was there a time when you did not have a steady place to sleep or slept in a shelter (including now)? N   Transportation Needs   In the past 12 months, has lack of transportation kept you from medical appointments or from getting medications? no   In the past 12 months, has lack of transportation kept you from meetings, work, or from getting things needed for daily living? No

## 2024-06-26 NOTE — ED TRIAGE NOTES
Pt to ED from home after left sided weakness and left facial droop developed at 0830. Pt fell in shower, denies LOC and denies taking a blood thinner. EMS reports glucose 163. Pt alert and oriented x4, airway patent.

## 2024-06-26 NOTE — H&P
History Of Present Illness  Jordon Waddell is a 73 y.o. male presenting with ICH.    ICH score 0 - documented 6/26/24 430PM    73 M h/o HTN, HLD, T2DM, OA, p/w L side weakness, CTH R thalamic ICH, CTA H/N neg, worsened dysarthria & weakness, rCTH slight incr blood    Patient states he was showering and had sudden onset weakness, fell without head strike. Taken to OSH where above imaging findings were seen. Endorses L weakness, facial droop, dysarthria, mild L side numbness, no fevers, chills, n/v, visual changes     Past Medical History  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       Surgical History  Past Surgical History:   Procedure Laterality Date    CARPAL TUNNEL RELEASE Bilateral 2000    CYST REMOVAL  1970    TONSILLECTOMY  1957    TOTAL HIP ARTHROPLASTY Left 2023        Social History  He reports that he quit smoking about 39 years ago. His smoking use included cigarettes. He started smoking about 44 years ago. He has a 5 pack-year smoking history. He has never been exposed to tobacco smoke. He has never used smokeless tobacco. He reports that he does not currently use alcohol after a past usage of about 1.0 standard drink of alcohol per week. He reports that he does not use drugs.    Family History  Family History   Problem Relation Name Age of Onset    Cancer Mother Marcela     Stroke Mother Marcela     Hypertension Mother Marcela     Hypertension Father Baltazar     Diabetes Maternal Grandfather Roger clark          Allergies  Penicillins    Review of Systems   Negative other than above    Physical Exam  NAD  Well perfused  Equal chest rise b/l  No skin lesions  Appropriate affect    Aox3  PERRL, EOMI, L FD, TM  LUE min wd  RUE prox 5 dist 5  LLE prox 4- dist 5  RLE prox 5 dist 5  Slight decr L sensation     Last Recorded Vitals  Temperature 35.5 °C (95.9 °F).    Relevant Results        Imaging as above     Assessment/Plan   Principal Problem:    Intracranial hemorrhage (Multi)      73 M h/o HTN, HLD, T2DM, OA, p/w L side weakness, CTH R thalamic ICH, CTA H/N neg, worsened dysarthria & weakness, rCTH slight incr blood    Patient overall stable in the setting of ICH.     -NSU  --150  -stroke c/s  -TTE  -labs  -repeat CT Head  -SCDs         Ankit Tyler MD

## 2024-06-26 NOTE — PROGRESS NOTES
[facility]  NEUROSCIENCE INTENSIVE CARE UNIT  DAILY PROGRESS NOTE       Patient Name: Jordon Waddell   MRN: [id number]     Admit Date: 2024     : 1950 AGE: 73 y.o. GENDER: male        Subjective    Jordon Waddell is a 73 y.o. male with PMH of HTN, HLD, DM, CKD2 initially presented to [facility] via EMS after experiencing left-sided weakness and left sided facial droop at 0830 . Per chart review, pt fell in the shower. Denies LOC and taking blood thinners. Initial BP at the ED was 175/86. CTH demonstrated R BG ICH Slight measurable increase size of intraparenchymal hematoma, 3.1 x1.9 x 1.6 cm compared with 2.4 x 1.5 x 1.0 cm. CTA H/N negative. Pt developed worsened dysarthria & weakness. He was started on cardene gtt. Pt deemed not a surgical candidate. Pt transferred to [facility] for further evaluation.     Interval Events: Admitted to NSU     Objective   VITALS (24H):  Temperature:  [36.7 °C (98.1 °F)] 36.7 °C (98.1 °F)  Heart Rate:  [67-92] 89  Respirations:  [14-17] 17  BP: (118-175)/(58-98) 141/74  INTAKE/OUTPUT:  Intake/Output Summary (Last 24 hours) at 2024 1626  Last data filed at 2024 1033  Gross per 24 hour   Intake 11.04 ml   Output --   Net 11.04 ml     VENT SETTINGS:        PHYSICAL EXAM:  NEURO:  - Alert, oriented x4, follows commands  - EOS, PERRL 3 mm bilaterally, EOMI, L facial droop  - RUE, bilaterally LE AG, LUE flicker WD  CV:  - RRR on telemetry, NSR    RESP:  - Regular, unlabored  - Oxygen: RA  :  - Voids  GI:  - Abdomen NT/ND, soft  SKIN:  - Intact    MEDICATIONS:  Scheduled: PRN: Continuous:            LAB RESULTS:  Results from last 72 hours   Lab Units 24  0928 24  1046   GLUCOSE mg/dL 140* 196*   SODIUM mmol/L 137 143   POTASSIUM mmol/L 3.8 4.1   CHLORIDE mmol/L 101 105   CO2 mmol/L 25 28   ANION GAP mmol/L 15 14   BUN mg/dL 21 19   CREATININE mg/dL 1.49* 1.38*   EGFR mL/min/1.73m*2 50* 54*   CALCIUM mg/dL 9.6 10.1   ALBUMIN g/dL 3.7 4.4       Results from last 72 hours   Lab Units 06/26/24  0928   WBC AUTO x10*3/uL 4.8   NRBC AUTO /100 WBCs 0.0   RBC AUTO x10*6/uL 4.49*   HEMOGLOBIN g/dL 13.4*   HEMATOCRIT % 40.3*   MCV fL 90   MCH pg 29.8   MCHC g/dL 33.3   RDW % 12.6   PLATELETS AUTO x10*3/uL 116*      Results from last 72 hours   Lab Units 06/26/24  0928   WBC AUTO x10*3/uL 4.8   NRBC AUTO /100 WBCs 0.0   RBC AUTO x10*6/uL 4.49*   HEMOGLOBIN g/dL 13.4*   HEMATOCRIT % 40.3*   MCV fL 90   MCH pg 29.8   MCHC g/dL 33.3   RDW % 12.6   PLATELETS AUTO x10*3/uL 116*   NEUTROS PCT AUTO % 57.8   IG PCT AUTO % 0.6   LYMPHS PCT AUTO % 26.2   MONOS PCT AUTO % 11.0   EOS PCT AUTO % 4.0   BASOS PCT AUTO % 0.4   NEUTROS ABS x10*3/uL 2.78   IG AUTO x10*3/uL 0.03   LYMPHS ABS AUTO x10*3/uL 1.26   MONOS ABS AUTO x10*3/uL 0.53   EOS ABS AUTO x10*3/uL 0.19   BASOS ABS AUTO x10*3/uL 0.02      Results from last 72 hours   Lab Units 06/26/24  0928   PROTIME seconds 10.9   INR  1.0   APTT seconds 30             IMAGING RESULTS:  CT head wo IV contrast   Final Result   Slight measurable increase size of intraparenchymal hematoma, 3.1 x   1.9 x 1.6 cm compared with 2.4 x 1.5 x 1.0 cm  with similar degree of   mass effect.        MACRO:   Kadeem Yip discussed the significance and urgency of this   critical finding by secure chat with  LISA RUGGIERO on 6/26/2024 at   12:53 pm.  (**-RCF-**) Findings:  See findings.             Signed by: Kadeem Ypi 6/26/2024 12:53 PM   Dictation workstation:   [id number]      CT angio brain attack neck w IV contrast and post procedure   Final Result   CTA neck:        No evidence for significant stenosis of the cervical vessels.        Calcified and noncalcified plaque at the carotid bifurcation without   significant stenosis by NASCET criteria.        CTA head:        No evidence for significant stenosis or large branch vessel cutoffs   of the intracranial vessels.        Atherosclerotic calcification along the cavernous segments of  the   carotid arteries.        MACRO:   None        Signed by: Virginia Mercedes 6/26/2024 9:40 AM   Dictation workstation:   [id number]      CT brain attack head wo IV contrast   Final Result   Intraparenchymal hemorrhage on the right in the posterior limb of the   internal capsule just lateral to the right thalamus with maximum   dimension of 2.4 cm        MACRO:   Myrna Loco discussed the significance and urgency of this   critical finding by telephone with  LISA RUGGIERO on 6/26/2024 at   9:18 am.  (**-RCF-**) Findings:  See findings.             Signed by: Myrna Loco 6/26/2024 9:20 AM   Dictation workstation:   [id number]      CT angio brain attack head w IV contrast and post procedure    (Results Pending)   CT head wo IV contrast    (Results Pending)         Assessment/Plan    NEURO:  #R BG ICH  Assessment:  - Neurologically: As above  Plan:  - NSU  - Neuro Checks: Q1H  - Pain: Tylenol PRN  - Nausea: ondansetron and metoclopramide  - PT/OT, swallow eval   - Fu stability scan   - Stroke c/s    CARDIOVASCULAR:  #HTN  #HLD  Assessment:  - Home meds: Amlodipine 5 mg daily, ASA 81 mg daily, Lisinopril-hydrochlorothiazide 20-12.5 mg BID, Rosuvastatin 40 mg daily,   - NSR 80s  Plan:  - Continue to monitor on telemetry  - BP goal: -150 mmHg (presenting SBP was 150-220 mmHg)    --> PRN: Labetalol, Hydralazine, and Nicardipine   - Continue Cardene gtt   - Fu ECHO    RESPIRATORY:  #JEANE  Assessment:  - RA  Plan:  - Continuous pulse oximetry   - O2 PRN to maintain SpO2 > 94%, wean as tolerated  - Incentive spirometry while awake    RENAL/:  #CKD2  Assessment:  - Baseline BUN/Cr: 19/1.38  Results from last 72 hours   Lab Units 06/26/24  0928 06/24/24  1046   BUN mg/dL 21 19   CREATININE mg/dL 1.49* 1.38*       Plan:  - Monitor with daily RFP  - Maintain external urinary diversion device for strict I&O    FEN/GI:  #JEANE  Assessment:  - Last BM: PTA  Plan:  - Monitor and replace electrolytes per protocol  - IVF:  NS @ 75 mL/hr  - Diet: NPO   - Bowel Regimen: Docusate-Senna  BID  and Miralax  Daily    ENDOCRINE:  #T2DM  Assessment:  Results from last 7 days   Lab Units 24  0928 24  0926 24  1046   POCT GLUCOSE mg/dL  --  158*  --    GLUCOSE mg/dL 140*  --  196*   - Home meds: Janumet BID   - A1C 24: 5.9  Plan:  - Accuchecks & ISS Q4H   - Hold home Metformin     HEMATOLOGY:  #JEANE  Assessment:  - Baseline Hgb: 15  - Baseline Plts: 126  Results from last 7 days   Lab Units 24  0928   HEMOGLOBIN g/dL 13.4*   HEMATOCRIT % 40.3*   PLATELETS AUTO x10*3/uL 116*     Plan:  - Continue to monitor with daily CBC and Coag panel    INFECTIOUS DISEASE:  #JEANE  Assessment:  Results from last 7 days   Lab Units 24  0928   WBC AUTO x10*3/uL 4.8    - Temp (24hrs), Av.7 °C (98.1 °F), Min:36.7 °C (98.1 °F), Max:36.7 °C (98.1 °F)     Plan:  - Continue to monitor for s/sx of infection  - Pan culture for temperature > 38.4 C    MUSCULOSKELETAL:  - No acute issues    SKIN:  - No acute issues  - Turns and skin care per NSU protocol    ACCESS:  - PIV    PROPHYLAXIS:  - DVT Ppx: SCDs and Pharmacological DVT ppx on hold until PBD2 due to risk of bleeding  - GI Ppx: Not indicated     RESTRAINTS:  Not indicated/Patient does not meet criteria for restraints    JUAN Garza-CNP  Neuroscience Intensive Care       Total critical care time of 45 minutes, with > 50% of time spent in direct contact with patient/family for education, counseling and coordination of care.

## 2024-06-26 NOTE — PROGRESS NOTES
Home with wife      06/26/24 1727   Current Planned Discharge Disposition   Current Planned Discharge Disposition Home

## 2024-06-26 NOTE — CONSULTS
STROKE CONSULT NOTE    History of Present Illness: Jordon Waddell is a 73 y.o. Handed: right handed male with a PMHx of HTN, HLD, DM, CKD2, who was transferred for further management and ICU care regarding R subcortical ICH.     The patient was in his usual state of health until 0815 6/26 when he started having weakness involving the left upper extremity, he felt he had no , and his lower extremity gave away. He then went down without hitting his head. He reported that his BP has been controlled and he was at the Dr office few days ago and his BP was 130s/60s. He is compliant to his medications. He never had similar presentations. During this events he denied any LOC, nausea or vomiting. He was never on any blood thinners, and only uses aspirin. No history of HA, visual changes, abnormal movements or seizure like activity. No chest pain.     His wife called the ambulance and the patient then was taken to [facility], his initial NIHSS was 6 (1=minor facial paralysis, 1=Left arm drift, 1=Left leg drift, 1=Ataxia, 1=mild aphasia, 1=mild dysarthria). CTH showed right thalamic ICH measuring 2.4 x 1.5 x 1.0 cm. The initial BP was 175/86. He was given a dose of labetalol and started on Cardene drip. The patient then had a worsening of his clinical examination with a slight increase in his NIHSS of 9 (for more profound weakness). The CTH was then repeated, showed expansion of the bleed, 3.1 x1.9 x 1.6 cm. For that reason the patient was transferred to [facility] for further management.         ROS: All systems reviewed and were negative except as above    Home Medications:    Current Outpatient Medications   Medication Instructions    amLODIPine (NORVASC) 5 mg, oral, Daily    ammonium lactate (Lac-Hydrin) 12 % lotion Topical, 2 times daily    aspirin 81 mg EC tablet Take 1 tablet (81 mg) by mouth once daily.    blood-glucose meter (OneTouch Ultra2 Meter) misc 1 each, miscellaneous, Daily, Chec bg daily    cholecalciferol  (Vitamin D-3) 25 MCG (1000 UT) tablet Take 1 tablet (1,000 Units) by mouth once daily.    diphenhydrAMINE (SOMINEX) 25 mg, oral, Nightly PRN    glimepiride (AMARYL) 4 mg, oral, Daily    KRILL OIL ORAL Take 5,000 mg by mouth once daily.    lancets misc Check bg once a day    lisinopriL-hydrochlorothiazide 20-12.5 mg tablet 1 tablet, oral, 2 times daily    meloxicam (MOBIC) 15 mg, oral, Daily    OneTouch Ultra Test strip 1 strip, subdermal, Daily    rosuvastatin (CRESTOR) 40 mg, oral, Nightly    SITagliptin phos-metformin (Janumet) 50-1,000 mg tablet 1 tablet, oral, 2 times daily    vit E-vmvcgny-tutz-rutin-hb196 (Bioflex) 085-63-50-40 mg tablet 1 tablet, oral, Daily     Past Medical History:    has a past medical history of Acute respiratory infection (12/18/2023), Arthritis (12/01/2014), Arthritis of left hip, Arthritis of left shoulder region, Cataract (12/1/2021), Diabetes mellitus (Multi) (01/01/2009), Hyperlipidemia, Hypertension (06/12/2023), Left knee injury (12/18/2023), Macular degeneration, Obesity, Quadriceps muscle rupture, left, sequela (12/18/2023), Sleep apnea, obstructive, Stage 3a chronic kidney disease (CKD) (Multi), and Visual impairment (6th grade).    Past Surgical History:    has a past surgical history that includes Total hip arthroplasty (Left, 2023); Carpal tunnel release (Bilateral, 2000); Tonsillectomy (1957); and Cyst Removal (1970).    Allergies:   Allergies   Allergen Reactions    Penicillins Unknown     Childhood allergy, does not remember exact reaction, possibly had rash or hives.          Family History:   Family History   Problem Relation Name Age of Onset    Cancer Mother Marcela     Stroke Mother Marcela     Hypertension Mother Marcela     Hypertension Father Baltazar     Diabetes Maternal Grandfather Roger clark        Past Social History:   Social History     Tobacco Use    Smoking status: Former     Current packs/day: 0.00     Average packs/day: 1 pack/day for 5.0 years (5.0 ttl pk-yrs)      Types: Cigarettes     Start date: 1980     Quit date: 1985     Years since quittin.5     Passive exposure: Never    Smokeless tobacco: Never   Vaping Use    Vaping status: Never Used   Substance Use Topics    Alcohol use: Not Currently     Alcohol/week: 1.0 standard drink of alcohol     Types: 1 Cans of beer per week    Drug use: Never       Vitals:   Temp:  [35.5 °C (95.9 °F)-36.7 °C (98.1 °F)] 35.5 °C (95.9 °F)  Heart Rate:  [67-92] 89  Resp:  [14-17] 17  BP: (118-175)/(58-98) 141/74    Physical Exam:   Neurological Exam  Mental Status  Awake, alert and oriented to person, place and time. Speech is slurred but can be understood. Able to name objects, name parts of objects, repeat, read and write. Follows commands.     Cranial Nerves  CN II: Visual acuity is normal. Visual fields full to confrontation.  CN III, IV, VI: Extraocular movements intact bilaterally. Normal lids and orbits bilaterally. Pupils equal round and reactive to light bilaterally.  CN V: Facial sensation is mildly decreased on the left side.  CN VII: Left facial droop.   CN VIII: Hearing is normal.  CN IX, X: Palate elevates symmetrically. Normal gag reflex.  CN XI: Shoulder shrug strength is normal.  CN XII: Tongue midline without atrophy or fasciculations.    Motor  Normal muscle bulk throughout. Normal muscle tone. No abnormal involuntary movements.     Strength                           R         L  Delt                  5          0  Bicep                5          0  Hip Flex            5          4  Leg Ext             5          4  Leg Flex           5          4  Dorsiflex          5          4  Plantarflex        5          4        Sensory  Decrease sensation to light touch, in the left upper and lower extremity.    Reflexes                                            Right                      Left  Brachioradialis                    2+                         3+  Biceps                                 2+                 "         3+  Triceps                                2+                         3+  Patellar                                2+                         3+  Achilles                                2+                         3+    Planter is down going on the left side and equivocal on the right side.     Coordination    Finger-to-nose normal on the right side without dysmetria. Could not be done on the left due to weakness.     Gait  Could not be examined.                 Labs:   Results from last 72 hours   Lab Units 06/26/24  0928   WBC AUTO x10*3/uL 4.8   HEMOGLOBIN g/dL 13.4*   HEMATOCRIT % 40.3*   PLATELETS AUTO x10*3/uL 116*      Results from last 72 hours   Lab Units 06/26/24  0928 06/24/24  1046   SODIUM mmol/L 137 143   POTASSIUM mmol/L 3.8 4.1   CHLORIDE mmol/L 101 105   CO2 mmol/L 25 28   BUN mg/dL 21 19   CREATININE mg/dL 1.49* 1.38*   GLUCOSE mg/dL 140* 196*      Results from last 72 hours   Lab Units 06/26/24  0928 06/24/24  1046   ALK PHOS U/L 61 70   AST U/L 15 17   ALT U/L 20 19   BILIRUBIN TOTAL mg/dL 0.5 0.4           Results from last 7 days   Lab Units 06/24/24  1150   POC HEMOGLOBIN A1C % 6.0     No results found for: \"BNP\"    Lab Results   Component Value Date    GLUCOSEU NEGATIVE 07/12/2023    BLOODU NEGATIVE 07/12/2023    PROTUR 30 (1+) (A) 07/12/2023    NITRITEU NEGATIVE 07/12/2023    LEUKOCYTESU NEGATIVE 07/12/2023    WBCU <1 07/12/2023    RBCU 1 07/12/2023         Imaging:  CT head wo IV contrast    Result Date: 6/26/2024  Slight measurable increase size of intraparenchymal hematoma, 3.1 x 1.9 x 1.6 cm compared with 2.4 x 1.5 x 1.0 cm  with similar degree of mass effect.   MACRO: Kadeem Yip discussed the significance and urgency of this critical finding by secure chat with  LISA RUGGIERO on 6/26/2024 at 12:53 pm.  (**-RCF-**) Findings:  See findings.     Signed by: Kadeem Yip 6/26/2024 12:53 PM Dictation workstation:   [id number]       Assessment/Recommendations  Jordon Waddell is " a 73 y.o. Handed: right handed male with a PMHx of HTN, HLD, DM, CKD2, who presents to  R subcortical ICH.  P/w sudden onset left sided weakness and L sided FD after fall w/o LOC. Initial .  Imaging personally reviewed. CTH with R BG ICH ~1.4 ml at 9 AM  expanded to 2.9 ml at 12:30.  No acute neurosurgical intervention.  Given location suspect etiology of bleed likely hypertensive. The patient is currently on Cardene drip with a target -150    6/26/24 - A1c 6.0%. LDL 46    Type: ICH              BP goal: 130-150              IVH: Yes              ICH volume: 1.4 ml -> 2,9 ml   Subtype/etiology: hypertensive   Vessels involved: lenticulostriate  Neurological manifestations: Left sided weakness involving face, arm and legs.     NIHSS (worst at presentation): 9 --> 12  Antiplatelet/antithrombotic plan for stroke prevention: hold   VTE prophylaxis: hold      Vascular Risk Factor modification goals:  Blood pressure goals: avoid hypotension SBP <100 that could worsen cerebral perfusion, Inpatient Intracerebral hemorrhage- -150 mmHg (if presenting SBP was 150-220 mmHg)   Lipid Goals: education on healthy diet and statin therapy to maintain or achieve goal LDL-cholesterol < 70mg  Glucose Goals: early treatment of hyperglycemia to goal glucose 140-180 mg/dl with long-term goal A1c < 7%   Smoking Cessation and Education  Assessment for Rehabilitation needs   Patient and family education on signs and symptoms of stroke, calling 911, healthy strategies for stroke prevention.         #R BG ICH   - Hold home ASA   - Stability CTH in 1800   - Hold DVT prophylaxis till at least PBD 2   - Resume home HTN medications as below   - PT/OT/SLP   - Echo  - Ok to continue with amlodipine 5 mg daily   - Hold lisinopril-hydrochlorothiazide 20/12.5 BID (Hold)   - Start lisinopril 20 mg for now.   - Ok to continue with rosuvastatin 40 mg daily     Discussed with Dr. Adam and stroke neurology accepts the transfer of care.      Pt will be formally staffed with the attending in the AM.       Kingston Malcolm MD  Neurology PGY-2    ~~~~~~~~~~~~~~~~~~~~~~~~~~~~~~~~~~~~~~~  Stroke neurology Senior resident addendum:  Mr. Jordon Waddell is a 73-year-old right-handed male that presents to [facility] from outside hospital for right basal ganglia ICH.  He has a history notable for hypertension, hyperlipidemia, diabetes mellitus type 2, CKD stage II.    He reports feeling fine the morning of 2024.  He was showering.  When he used soap he had he had no  in his left hand.  His left leg also felt weak.  He states he fell in the tub but did not hit his head.  His wife subsequently called EMS.  This occurred at approximately 8:15 AM.  He denies this ever happening in the past.    He states he takes his blood pressure medications daily.  States he takes his BP meds because his father  of congestive heart failure.  States that his blood pressure at his most recent PCP appointment on  demonstrated a systolic blood pressure of 130.  He denies any history of heart problems, cancer, prior episodes of elevated blood pressure, or prior anticoagulation use.    He states he is functionally independent at home.  Endorses being independent in activities of daily living.  Ambulates without a walker or a cane.  He drinks alcohol regularly (once every 2 to-3 weeks).  Reports living and does not use tobacco or recreational drugs with his wife in [place].    On review of systems he denies any nausea or vomiting, abnormal movements, chest pain, shortness of breath, vision change, headache, swallowing issues, double vision, weakness, numbness, gait issues, constipation, diarrhea.    CT head from morning of 2024 personally reviewed and demonstrated a right basal ganglia ICH measuring 2.4 x 1.5 x 1.0 cm with no IVH.  Repeat CT head was obtained 3 hours later and demonstrated interval increase in the size of the ICH to 3.1 x 1.9 x 1.6 cm with  no intraventricular hemorrhage..    At time of examination patient was on Cardene 2.5 and blood pressure was systolic 140.  Exam was notable for dysarthria, left facial droop, left upper extremity flaccid.    Impression:  Right basal ganglia ICH without IVH.  ICH score 0.    Recommendations:  -Continue Cardene as needed for systolic blood pressure goal 130-150  -Once passes bedside swallow evaluation, can reinitiate home blood pressure medications  -Continue holding home aspirin  -Continue home statin    After discussing with Dr. Adam, stroke neurology accepts transfer of care    Shayan Velasquez MD  Neurology PGY-2  Stroke Team, Pager [phone]

## 2024-06-26 NOTE — PROGRESS NOTES
Pharmacy Medication History Review    Per patient and family bedside.     Jordon Waddell is a 73 y.o. male admitted for No Principal Problem: There is no principal problem currently on the Problem List. Please update the Problem List and refresh.. Pharmacy reviewed the patient's ysaio-rw-ceeanmtwv medications and allergies for accuracy.    The list below reflectives the updated PTA list. Please review each medication in order reconciliation for additional clarification and justification.       The list below reflectives the updated allergy list. Please review each documented allergy for additional clarification and justification.  Allergies  Reviewed by Mari Lopes RN on 2024        Severity Reactions Comments    Penicillins Low Unknown Childhood             Below are additional concerns with the patient's PTA list.  Prior to Admission Medications   Prescriptions Last Dose Informant   KRILL OIL ORAL 2024    Sig: Take 5,000 mg by mouth once daily.   OneTouch Ultra Test strip     Si strip by subdermal route once daily.   SITagliptin phos-metformin (Janumet) 50-1,000 mg tablet 2024    Sig: Take 1 tablet by mouth 2 times a day.   amLODIPine (Norvasc) 5 mg tablet 2024    Sig: Take 1 tablet (5 mg) by mouth once daily.   ammonium lactate (Lac-Hydrin) 12 % lotion     Sig: Apply topically 2 times a day.   Patient taking differently: Apply 1 Application topically 2 times a day as needed for dry skin.   aspirin 81 mg EC tablet 2024    Sig: Take 1 tablet (81 mg) by mouth once daily.   blood-glucose meter (OneTouch Ultra2 Meter) misc     Si each once daily. Chec bg daily   cholecalciferol (Vitamin D-3) 25 MCG (1000 UT) tablet 2024    Sig: Take 1 tablet (1,000 Units) by mouth once daily.   diphenhydrAMINE (Sominex) 25 mg tablet     Sig: Take 1 tablet (25 mg) by mouth as needed at bedtime for sleep or allergies.   glimepiride (Amaryl) 4 mg tablet 2024    Sig: Take 1 tablet (4 mg) by  mouth once daily.   lancets misc     Sig: Check bg once a day   lisinopriL-hydrochlorothiazide 20-12.5 mg tablet 6/26/2024    Sig: Take 1 tablet by mouth 2 times a day.   meloxicam (Mobic) 15 mg tablet 6/26/2024    Sig: Take 1 tablet (15 mg) by mouth once daily.   rosuvastatin (Crestor) 40 mg tablet 6/25/2024    Sig: Take 1 tablet (40 mg) by mouth once daily at bedtime.   vit K-waleaea-lqpd-rutin-hb196 (Bioflex) 877-82-79-40 mg tablet 6/26/2024    Sig: Take 1 tablet by mouth once daily.              Facility-Administered Medications: None        Bria Lacey

## 2024-06-26 NOTE — ED PROVIDER NOTES
Limitations to History: None  Additional History Obtained from: EMS    HPI:    Patient is presenting to the emergency department as a prehospital stroke alert.  Patient was in his shower when he began having left upper extremity weakness and left lower extremity weakness.  He states he reached for the shampoo bottle was able to pick it up.  He states he felt his leg gave out from under him and he went to the ground.  Denied any head injury or loss of consciousness.  Denies any headaches, changes in vision or hearing.  He states he feels that his speech is different than his baseline.  EMS was called who noted the patient to have a left-sided facial droop.  Patient denies any head injury or loss of consciousness, denies use of blood thinners.  Denies history of similar symptoms previously.  Denies any history of seizures.  Review of systems is otherwise negative.  Last Known Well Time: 0815  ------------------------------------------------------------------------------------------------------------------------------------------  Physical Exam:    VS: As documented in the triage note and EMR flowsheet from this visit were reviewed.  General: Well appearing. No acute distress.   Eyes: Pupils round and reactive. No scleral icterus. No conjunctival injection  HENT: Atraumatic. Normocephalic. Moist mucous membranes. Trachea midline  CV: RRR, No MRG. No pedal edema appreciated.  Resp: Clear to auscultation bilaterally. Non-labored.    GI: Soft, nontender to palpation. Nondistended. No guarding, rigidity or rebound  Skin: Warm, dry, intact. No systemic rashes or lesions appreciated.  Extremities: No deformities or pain out of proportion; pulses intact   Neuro: see NIH as below  Psych: Appropriate. [place].    Interval: Baseline  Time: 9:20 AM  Person Administering Scale: Rita Donaldson DO     1a  Level of consciousness: 0=alert; keenly responsive   1b. LOC questions:  0=Performs both tasks correctly   1c. LOC commands:  0=Performs both tasks correctly   2.  Best Gaze: 0=normal   3. Visual: 0=No visual loss   4. Facial Palsy: 1=Minor paralysis (flattened nasolabial fold, asymmetric on smiling)   5a. Motor left arm: 1=Drift, limb holds 90 (or 45) degrees but drifts down before full 10 seconds: does not hit bed   5b.  Motor right arm: 0=No drift, limb holds 90 (or 45) degrees for full 10 seconds   6a. motor left le=Drift, limb holds 90 (or 45) degrees but drifts down before full 10 seconds: does not hit bed   6b  Motor right le=No drift, limb holds 90 (or 45) degrees for full 10 seconds   7. Limb Ataxia: 1=Present in one limb   8.  Sensory: 0=Normal; no sensory loss   9. Best Language:  1=Mild to moderate aphasia; some obvious loss of fluency or facility of comprehension without significant limitation on ideas expressed or form of expression.   10. Dysarthria: 1=Mild to moderate, patient slurs at least some words and at worst, can be understood with some difficulty   11. Extinction and Inattention: 0=No abnormality   12. Distal motor function: 0=Normal     Total:   6     VAN: VAN: Positive  ------------------------------------------------------------------------------------------------------------------------------------------    Medical Decision Making/Course of Care:  Patient presented to the emergency department as a prehospital stroke alert.  Seen is on EMS arrival and taken to CT scan.  CT scan of his head remarkable for an intraparenchymal bleed.  Patient not on any blood thinners.  Initial blood pressure obtained elevated, patient given a dose of labetalol and started on a Cardene drip for goal systolic less than 140.  After starting on blood pressure medication return from CT the patient's neurologic exam is overall improving, NIH now 6. Had some worsening neurologic exam during the patient's stay.  Slight increase to NIH of 9 due to some continued weakness.  Repeat head CT obtained showing worsening bleed.  Patient  was discussed with stroke neurology here who recommended consultation with neurosurgery, neurosurgery recommended consultation with stroke neurology.  Due to the patient's worsening head CT and worsening neurologic exam patient [facility] with [facility] who accepted the patient to the neuro ICU.  Patient remained hemodynamically otherwise stable.  Was initially able to be weaned off of his Cardene drip but required replacement due to systolics greater than 150.  Blood work noted, patient and family updated with plan of care throughout the patient's stay.    EKG reviewed and interpreted by me  Sinus rhythm with PACs at 92 bpm, normal intervals, ST flattening throughout no ischemic changes noted    External Records Reviewed: I reviewed recent and relevant outside records including: HIE/Community Record  Escalation of Care: Appropriate for Admission  Social Determinants Affecting Care:Not applicable  Prescription Drug Consideration: see orders  Diagnostic testing considered: ct  Discussion of Management with Other Providers: I discussed the patient/results with: NS, stroke neuro    I have independently interpreted the following labs, imaging studies and MDM added to ED Course  Labs Reviewed   CBC WITH AUTO DIFFERENTIAL - Abnormal       Result Value    WBC 4.8      nRBC 0.0      RBC 4.49 (*)     Hemoglobin 13.4 (*)     Hematocrit 40.3 (*)     MCV 90      MCH 29.8      MCHC 33.3      RDW 12.6      Platelets 116 (*)     Neutrophils % 57.8      Immature Granulocytes %, Automated 0.6      Lymphocytes % 26.2      Monocytes % 11.0      Eosinophils % 4.0      Basophils % 0.4      Neutrophils Absolute 2.78      Immature Granulocytes Absolute, Automated 0.03      Lymphocytes Absolute 1.26      Monocytes Absolute 0.53      Eosinophils Absolute 0.19      Basophils Absolute 0.02     COMPREHENSIVE METABOLIC PANEL - Abnormal    Glucose 140 (*)     Sodium 137      Potassium 3.8      Chloride 101      Bicarbonate 25       Anion Gap 15      Urea Nitrogen 21      Creatinine 1.49 (*)     eGFR 50 (*)     Calcium 9.6      Albumin 3.7      Alkaline Phosphatase 61      Total Protein 6.1 (*)     AST 15      Bilirubin, Total 0.5      ALT 20     POCT GLUCOSE - Abnormal    POCT Glucose 158 (*)    TROPONIN I, HIGH SENSITIVITY - Normal    Troponin I, High Sensitivity 13      Narrative:     Less than 99th percentile of normal range cutoff-  Female and children under 18 years old <14 ng/L; Male <21 ng/L: Negative  Repeat testing should be performed if clinically indicated.     Female and children under 18 years old 14-50 ng/L; Male 21-50 ng/L:  Consistent with possible cardiac damage and possible increased clinical   risk. Serial measurements may help to assess extent of myocardial damage.     >50 ng/L: Consistent with cardiac damage, increased clinical risk and  myocardial infarction. Serial measurements may help assess extent of   myocardial damage.      NOTE: Children less than 1 year old may have higher baseline troponin   levels and results should be interpreted in conjunction with the overall   clinical context.     NOTE: Troponin I testing is performed using a different   testing methodology at [facility] than at other   [facility]. Direct result comparisons should only   be made within the same method.   PROTIME-INR - Normal    Protime 10.9      INR 1.0     APTT - Normal    aPTT 30      Narrative:     The APTT is no longer used for monitoring Unfractionated Heparin Therapy. For monitoring Heparin Therapy, use the Heparin Assay.   POCT GLUCOSE METER       CT head wo IV contrast   Final Result   Slight measurable increase size of intraparenchymal hematoma, 3.1 x   1.9 x 1.6 cm compared with 2.4 x 1.5 x 1.0 cm  with similar degree of   mass effect.        MACRO:   Kadeem Yip discussed the significance and urgency of this   critical finding by secure chat with  LISA RUGGIERO on 6/26/2024 at   12:53 pm.  (**-RCF-**)  Findings:  See findings.             Signed by: Kadeem Yip 6/26/2024 12:53 PM   Dictation workstation:   [id number]      CT angio brain attack neck w IV contrast and post procedure   Final Result   CTA neck:        No evidence for significant stenosis of the cervical vessels.        Calcified and noncalcified plaque at the carotid bifurcation without   significant stenosis by NASCET criteria.        CTA head:        No evidence for significant stenosis or large branch vessel cutoffs   of the intracranial vessels.        Atherosclerotic calcification along the cavernous segments of the   carotid arteries.        MACRO:   None        Signed by: Virginia Mercedes 6/26/2024 9:40 AM   Dictation workstation:   [id number]      CT brain attack head wo IV contrast   Final Result   Intraparenchymal hemorrhage on the right in the posterior limb of the   internal capsule just lateral to the right thalamus with maximum   dimension of 2.4 cm        MACRO:   Myrna Loco discussed the significance and urgency of this   critical finding by telephone with  LISA DONALDSON on 6/26/2024 at   9:18 am.  (**-RCF-**) Findings:  See findings.             Signed by: Myrna Loco 6/26/2024 9:20 AM   Dictation workstation:   [id number]      CT angio brain attack head w IV contrast and post procedure    (Results Pending)         ED Course as of 06/27/24 0920   Wed Jun 26, 2024   0903 Seen and evaluated upon arrival. Taken to CT after arrival.  [LP]   0910 Call from CT, small bleed noted, will proceed with CT angio to evaluate for any aneurysm [LP]   1036 BP stable, will try to wean off of cardene; call placed to NS. Neuro exam overall improving, improved speech [LP]   1255 Received results regarding repeat ct from rads; new stability scan to be ordered and will d/w NS [LP]      ED Course User Index  [LP] Lisa Donaldson, DO         Diagnoses as of 06/27/24 0920   Intracranial hemorrhage (Multi)         IV Thrombolysis:    No Thrombolysis  contraindication reason: CT (or MRI) with evidence of Acute Intracranial Hemorrhage        Procedure  Critical Care    Performed by: Rita Donaldson DO  Authorized by: Rita Donaldson DO    Critical care provider statement:     Critical care time (minutes):  60    Critical care time was exclusive of:  Teaching time and separately billable procedures and treating other patients    Critical care was necessary to treat or prevent imminent or life-threatening deterioration of the following conditions:  CNS failure or compromise    Critical care was time spent personally by me on the following activities:  Blood draw for specimens, development of treatment plan with patient or surrogate, discussions with consultants, evaluation of patient's response to treatment, examination of patient, obtaining history from patient or surrogate, ordering and performing treatments and interventions, ordering and review of laboratory studies, ordering and review of radiographic studies, pulse oximetry, re-evaluation of patient's condition and review of old charts    Care discussed with: accepting provider at another facility        Disposition: transfer    Rita Donaldson DO  Emergency Medicine  Medical Toxicology     Rita Donaldson DO  06/26/24 1544       Rita Donaldson DO  06/27/24 0920

## 2024-06-26 NOTE — PROGRESS NOTES
06/26/24 1036   [facility] Disability Status   Are you deaf or do you have serious difficulty hearing? N   Are you blind or do you have serious difficulty seeing, even when wearing glasses? N   Because of a physical, mental, or emotional condition, do you have serious difficulty concentrating, remembering, or making decisions? (5 years old or older) Y   Do you have serious difficulty walking or climbing stairs? Y  (fall, left sided weakness)   Do you have serious difficulty dressing or bathing? N   Because of a physical, mental, or emotional condition, do you have serious difficulty doing errands alone such as visiting the doctor? Y

## 2024-06-27 ENCOUNTER — APPOINTMENT (OUTPATIENT)
Dept: RADIOLOGY | Facility: HOSPITAL | Age: 74
DRG: 064 | End: 2024-06-27
Payer: MEDICARE

## 2024-06-27 ENCOUNTER — APPOINTMENT (OUTPATIENT)
Dept: CARDIOLOGY | Facility: HOSPITAL | Age: 74
End: 2024-06-27
Payer: MEDICARE

## 2024-06-27 LAB
ALBUMIN SERPL BCP-MCNC: 4.4 G/DL (ref 3.4–5)
ANION GAP SERPL CALC-SCNC: 14 MMOL/L (ref 10–20)
AORTIC VALVE PEAK VELOCITY: 1.22 M/S
APTT PPP: 25 SECONDS (ref 27–38)
AV PEAK GRADIENT: 6 MMHG
AVA (PEAK VEL): 2 CM2
BASOPHILS # BLD AUTO: 0.02 X10*3/UL (ref 0–0.1)
BASOPHILS NFR BLD AUTO: 0.3 %
BUN SERPL-MCNC: 19 MG/DL (ref 6–23)
CA-I BLD-SCNC: 1.18 MMOL/L (ref 1.1–1.33)
CALCIUM SERPL-MCNC: 9.9 MG/DL (ref 8.6–10.6)
CHLORIDE SERPL-SCNC: 102 MMOL/L (ref 98–107)
CO2 SERPL-SCNC: 28 MMOL/L (ref 21–32)
CREAT SERPL-MCNC: 1.21 MG/DL (ref 0.5–1.3)
EGFRCR SERPLBLD CKD-EPI 2021: 64 ML/MIN/1.73M*2
EJECTION FRACTION APICAL 4 CHAMBER: 48.9
EJECTION FRACTION: 53 %
EOSINOPHIL # BLD AUTO: 0.13 X10*3/UL (ref 0–0.4)
EOSINOPHIL NFR BLD AUTO: 1.9 %
ERYTHROCYTE [DISTWIDTH] IN BLOOD BY AUTOMATED COUNT: 12.6 % (ref 11.5–14.5)
GLUCOSE BLD MANUAL STRIP-MCNC: 113 MG/DL (ref 74–99)
GLUCOSE BLD MANUAL STRIP-MCNC: 119 MG/DL (ref 74–99)
GLUCOSE BLD MANUAL STRIP-MCNC: 120 MG/DL (ref 74–99)
GLUCOSE BLD MANUAL STRIP-MCNC: 120 MG/DL (ref 74–99)
GLUCOSE BLD MANUAL STRIP-MCNC: 164 MG/DL (ref 74–99)
GLUCOSE SERPL-MCNC: 95 MG/DL (ref 74–99)
HCT VFR BLD AUTO: 40.4 % (ref 41–52)
HGB BLD-MCNC: 13.5 G/DL (ref 13.5–17.5)
IMM GRANULOCYTES # BLD AUTO: 0.03 X10*3/UL (ref 0–0.5)
IMM GRANULOCYTES NFR BLD AUTO: 0.4 % (ref 0–0.9)
INR PPP: 1 (ref 0.9–1.1)
LEFT VENTRICLE INTERNAL DIMENSION DIASTOLE: 5.6 CM (ref 3.5–6)
LEFT VENTRICULAR OUTFLOW TRACT DIAMETER: 2.1 CM
LYMPHOCYTES # BLD AUTO: 1.62 X10*3/UL (ref 0.8–3)
LYMPHOCYTES NFR BLD AUTO: 23.3 %
MAGNESIUM SERPL-MCNC: 1.64 MG/DL (ref 1.6–2.4)
MCH RBC QN AUTO: 29.5 PG (ref 26–34)
MCHC RBC AUTO-ENTMCNC: 33.4 G/DL (ref 32–36)
MCV RBC AUTO: 88 FL (ref 80–100)
MITRAL VALVE E/A RATIO: 0.76
MONOCYTES # BLD AUTO: 0.68 X10*3/UL (ref 0.05–0.8)
MONOCYTES NFR BLD AUTO: 9.8 %
NEUTROPHILS # BLD AUTO: 4.46 X10*3/UL (ref 1.6–5.5)
NEUTROPHILS NFR BLD AUTO: 64.3 %
NRBC BLD-RTO: 0 /100 WBCS (ref 0–0)
PHOSPHATE SERPL-MCNC: 3.3 MG/DL (ref 2.5–4.9)
PLATELET # BLD AUTO: 143 X10*3/UL (ref 150–450)
POTASSIUM SERPL-SCNC: 3.6 MMOL/L (ref 3.5–5.3)
PROTHROMBIN TIME: 11.7 SECONDS (ref 9.8–12.8)
RBC # BLD AUTO: 4.57 X10*6/UL (ref 4.5–5.9)
SODIUM SERPL-SCNC: 140 MMOL/L (ref 136–145)
WBC # BLD AUTO: 6.9 X10*3/UL (ref 4.4–11.3)

## 2024-06-27 PROCEDURE — 2500000004 HC RX 250 GENERAL PHARMACY W/ HCPCS (ALT 636 FOR OP/ED): Performed by: STUDENT IN AN ORGANIZED HEALTH CARE EDUCATION/TRAINING PROGRAM

## 2024-06-27 PROCEDURE — 71045 X-RAY EXAM CHEST 1 VIEW: CPT

## 2024-06-27 PROCEDURE — 93010 ELECTROCARDIOGRAM REPORT: CPT | Performed by: INTERNAL MEDICINE

## 2024-06-27 PROCEDURE — 87081 CULTURE SCREEN ONLY: CPT | Performed by: STUDENT IN AN ORGANIZED HEALTH CARE EDUCATION/TRAINING PROGRAM

## 2024-06-27 PROCEDURE — 93306 TTE W/DOPPLER COMPLETE: CPT | Performed by: STUDENT IN AN ORGANIZED HEALTH CARE EDUCATION/TRAINING PROGRAM

## 2024-06-27 PROCEDURE — 80069 RENAL FUNCTION PANEL: CPT

## 2024-06-27 PROCEDURE — 85610 PROTHROMBIN TIME: CPT | Performed by: STUDENT IN AN ORGANIZED HEALTH CARE EDUCATION/TRAINING PROGRAM

## 2024-06-27 PROCEDURE — 97166 OT EVAL MOD COMPLEX 45 MIN: CPT | Mod: GO

## 2024-06-27 PROCEDURE — 85025 COMPLETE CBC W/AUTO DIFF WBC: CPT

## 2024-06-27 PROCEDURE — 97530 THERAPEUTIC ACTIVITIES: CPT | Mod: GP

## 2024-06-27 PROCEDURE — 83735 ASSAY OF MAGNESIUM: CPT

## 2024-06-27 PROCEDURE — 1100000001 HC PRIVATE ROOM DAILY

## 2024-06-27 PROCEDURE — 92526 ORAL FUNCTION THERAPY: CPT | Mod: GN

## 2024-06-27 PROCEDURE — 37799 UNLISTED PX VASCULAR SURGERY: CPT

## 2024-06-27 PROCEDURE — 36415 COLL VENOUS BLD VENIPUNCTURE: CPT | Performed by: STUDENT IN AN ORGANIZED HEALTH CARE EDUCATION/TRAINING PROGRAM

## 2024-06-27 PROCEDURE — 82330 ASSAY OF CALCIUM: CPT

## 2024-06-27 PROCEDURE — 82947 ASSAY GLUCOSE BLOOD QUANT: CPT | Mod: 91

## 2024-06-27 PROCEDURE — 71045 X-RAY EXAM CHEST 1 VIEW: CPT | Performed by: RADIOLOGY

## 2024-06-27 PROCEDURE — 93306 TTE W/DOPPLER COMPLETE: CPT

## 2024-06-27 PROCEDURE — 97162 PT EVAL MOD COMPLEX 30 MIN: CPT | Mod: GP

## 2024-06-27 PROCEDURE — 2500000001 HC RX 250 WO HCPCS SELF ADMINISTERED DRUGS (ALT 637 FOR MEDICARE OP)

## 2024-06-27 PROCEDURE — 92610 EVALUATE SWALLOWING FUNCTION: CPT | Mod: GN

## 2024-06-27 PROCEDURE — 2500000004 HC RX 250 GENERAL PHARMACY W/ HCPCS (ALT 636 FOR OP/ED)

## 2024-06-27 PROCEDURE — 87040 BLOOD CULTURE FOR BACTERIA: CPT | Performed by: STUDENT IN AN ORGANIZED HEALTH CARE EDUCATION/TRAINING PROGRAM

## 2024-06-27 RX ORDER — POTASSIUM CHLORIDE 14.9 MG/ML
20 INJECTION INTRAVENOUS ONCE
Status: COMPLETED | OUTPATIENT
Start: 2024-06-27 | End: 2024-06-27

## 2024-06-27 RX ORDER — MAGNESIUM SULFATE HEPTAHYDRATE 40 MG/ML
2 INJECTION, SOLUTION INTRAVENOUS ONCE
Status: COMPLETED | OUTPATIENT
Start: 2024-06-27 | End: 2024-06-27

## 2024-06-27 RX ORDER — AMLODIPINE BESYLATE 5 MG/1
5 TABLET ORAL DAILY
Status: DISCONTINUED | OUTPATIENT
Start: 2024-06-27 | End: 2024-06-28

## 2024-06-27 RX ORDER — AMLODIPINE BESYLATE 5 MG/1
5 TABLET ORAL DAILY
Status: DISCONTINUED | OUTPATIENT
Start: 2024-06-27 | End: 2024-06-27

## 2024-06-27 RX ORDER — LISINOPRIL 20 MG/1
20 TABLET ORAL DAILY
Status: DISCONTINUED | OUTPATIENT
Start: 2024-06-27 | End: 2024-06-28

## 2024-06-27 RX ORDER — LISINOPRIL 20 MG/1
20 TABLET ORAL DAILY
Status: DISCONTINUED | OUTPATIENT
Start: 2024-06-27 | End: 2024-06-27

## 2024-06-27 ASSESSMENT — ACTIVITIES OF DAILY LIVING (ADL)
ADL_ASSISTANCE: INDEPENDENT
BATHING_ASSISTANCE: MODERATE
ADL_ASSISTANCE: INDEPENDENT

## 2024-06-27 ASSESSMENT — COGNITIVE AND FUNCTIONAL STATUS - GENERAL
CLIMB 3 TO 5 STEPS WITH RAILING: TOTAL
DAILY ACTIVITIY SCORE: 14
STANDING UP FROM CHAIR USING ARMS: TOTAL
TOILETING: A LOT
DRESSING REGULAR UPPER BODY CLOTHING: A LOT
TURNING FROM BACK TO SIDE WHILE IN FLAT BAD: A LOT
MOVING TO AND FROM BED TO CHAIR: TOTAL
EATING MEALS: A LITTLE
HELP NEEDED FOR BATHING: A LOT
MOVING FROM LYING ON BACK TO SITTING ON SIDE OF FLAT BED WITH BEDRAILS: A LITTLE
PERSONAL GROOMING: A LITTLE
DRESSING REGULAR LOWER BODY CLOTHING: A LOT
WALKING IN HOSPITAL ROOM: TOTAL
MOBILITY SCORE: 9

## 2024-06-27 ASSESSMENT — PAIN - FUNCTIONAL ASSESSMENT
PAIN_FUNCTIONAL_ASSESSMENT: 0-10

## 2024-06-27 ASSESSMENT — PAIN SCALES - GENERAL
PAINLEVEL_OUTOF10: 0 - NO PAIN

## 2024-06-27 NOTE — PROGRESS NOTES
Physical Therapy    Physical Therapy Evaluation & Treatment    Patient Name: Jordon Waddell  MRN: [id number]  Today's Date: 6/27/2024   Time Calculation  Start Time: 0915  Stop Time: 0954  Time Calculation (min): 39 min    Assessment/Plan   PT Assessment  PT Assessment Results: Decreased strength, Decreased endurance, Impaired balance, Decreased mobility  Rehab Prognosis: Excellent  Evaluation/Treatment Tolerance: Patient tolerated treatment well  Strengths: Attitude of self, Support of extended family/friends  End of Session Communication: Bedside nurse  Assessment Comment: Pt to benefit from ongoing PT services to address the above limitations and to prepare pt for timely return to prior level of function.  End of Session Patient Position: Up in chair, Alarm on   IP OR SWING BED PT PLAN  Inpatient or Swing Bed: Inpatient  PT Plan  Treatment/Interventions: Bed mobility, Transfer training, Gait training, Stair training, Balance training, Neuromuscular re-education, Strengthening, Endurance training, Therapeutic exercise, Therapeutic activity, Home exercise program, Positioning, Postural re-education  PT Plan: Ongoing PT  PT Frequency: 5 times per week  PT Discharge Recommendations: High intensity level of continued care  PT Recommended Transfer Status: Assist x2  PT - OK to Discharge: Yes (When medically ready)      Subjective     General Visit Information:  General  Reason for Referral: Pt p/w sudden onset left sided weakness and L sided FD after fall w/o LOC. Found to have R BG ICH ~1.4 ml at 0900 6/26/24. Expanded to 2.9 ml at 1230.  No acute neurosurgical intervention. Likely hypertensive etiology. [facility] 6/26 2141 stable. GCS 15, NIHSS 7.  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, BETH several years ago  Missed Visit: No  Missed Visit Reason:  (n/a)  Family/Caregiver Present: Yes  Caregiver Feedback: Pt's spouse and son present and supportive  Co-Treatment:   (Rehab aide assisted with session)  Prior to Session Communication: Bedside nurse  Patient Position Received: Bed, 3 rail up, Alarm off, not on at start of session  Home Living:  Home Living  Type of Home: House  Lives With: Spouse  Home Adaptive Equipment: Walker rolling or standard  Home Layout: One level  Home Access: Stairs to enter with rails, Ramped entrance  Entrance Stairs-Number of Steps: .3+1 PAULETTE front. Ramp back.  Bathroom Shower/Tub: Tub/shower unit  Bathroom Equipment: Grab bars around toilet, Raised toilet seat without rails  Prior Level of Function:  Prior Function Per Pt/Caregiver Report  Level of [place]: Independent with ADLs and functional transfers, Independent with homemaking with ambulation  ADL Assistance: Independent  Homemaking Assistance: Independent  Ambulatory Assistance: Independent  Vocational: Full time employment (Works at [employer])  Hand Dominance: Right  Prior Function Comments: Drives, community ambulator, denies history of falling prior to this.  Precautions:  Precautions  Hearing/Visual Limitations: Glasses. Hearing WFL  Medical Precautions: Fall precautions  Precautions Comment: -150  Vital Signs:  Vital Signs  Heart Rate: 79 (70's during, 69 post)  SpO2: 95 % (95-97%)  BP: 144/61 ('s to 162 sustained sitting at edge of bed. RN administers PRN medication. SBP <150 throughout rest of session. 133/54 (82) post)  MAP (mmHg): 92  BP Method: Arterial line    Objective   Pain:  Pain Assessment  Pain Assessment: 0-10  0-10 (Numeric) Pain Score: 0 - No pain (Reports L shoulder pain with excessive PROM. Comfortable at rest.)  Pain Interventions: Elevated  Cognition:  Cognition  Overall Cognitive Status: Within Functional Limits  Orientation Level: Oriented X4  Insight: Within function limits  Impulsive: Within functional limits  Processing Speed: Within funtional limits    General Assessments:  Activity Tolerance  Endurance: Endurance does not limit participation in  activity  Early Mobility/Exercise Safety Screen: Proceed with mobilization - No exclusion criteria met    Sensation  Light Touch: No apparent deficits    Perception  Inattention/Neglect: Appears intact      Postural Control  Postural Control: Within Functional Limits  Head Control: WFL  Trunk Control: Posterior lean  Righting Reactions: Intact  Protective Responses: Intact to R, not able to elicit L.    Static Sitting Balance  Static Sitting-Balance Support: Bilateral upper extremity supported, Feet supported  Static Sitting-Level of Assistance: Minimum assistance (x1)  Static Sitting-Comment/Number of Minutes: 15 min  Dynamic Sitting Balance  Dynamic Sitting-Balance Support: Bilateral upper extremity supported, Feet supported  Dynamic Sitting-Balance: Forward lean  Dynamic Sitting-Comments: MOD A x1 forward lean    Static Standing Balance  Static Standing-Balance Support: Bilateral upper extremity supported  Static Standing-Level of Assistance: Moderate assistance (x2)  Dynamic Standing Balance  Dynamic Standing-Balance Support: Bilateral upper extremity supported  Dynamic Standing-Comments: MOD A x2  Functional Assessments:  Bed Mobility  Bed Mobility: Yes  Bed Mobility 1  Bed Mobility 1: Supine to sitting  Level of Assistance 1: Moderate assistance (x1`)  Bed Mobility Comments 1: Verbal/tactile cues for proper task sequencing. HOB elevated.  Extremity/Trunk Assessments:  RUE   RUE :  (Strength and ROM WFL)  LUE   LUE: Exceptions to WFL (PROM WFL; volitional strength 0/5. Observed trace spontaneous movement.)  LUE Strength  LUE Overall Strength:  (Scapular elevation 1/5)  L Forearm Supination: 1/5  RLE   RLE : Exceptions to WFL (ROM WFL)  Strength RLE  R Hip Flexion: 5/5  R Knee Flexion: 5/5  R Knee Extension: 5/5  R Ankle Dorsiflexion: 5/5  R Ankle Plantar Flexion: 5/5  LLE   LLE : Exceptions to WFL (ROM WFL)  Strength LLE  L Hip Flexion: 3+/5  L Knee Flexion: 4/5  L Knee Extension: 4/5  L Ankle Dorsiflexion:  4/5  L Ankle Plantar Flexion: 4/5  Treatments:  Transfers  Transfer: Yes  Transfer 1  Transfer From 1: Bed to  Transfer to 1: Stand  Technique 1: Sit to stand, Stand to sit  Transfer Device 1: Gait belt (BUE arm in arm assist)  Transfer Level of Assistance 1: Moderate assistance (x2)  Trials/Comments 1: L knee blocked 2/2 buckling. Cues for proper hand placement and controlling speed.  Transfers 2  Transfer From 2: Bed to  Transfer to 2: Chair with drop arm  Technique 2: Stand pivot  Transfer Device 2: Gait belt (BUE arm in arm assist)  Transfer Level of Assistance 2: Moderate assistance (x2)  Trials/Comments 2: L knee blocked 2/2 buckling. Cues for proper hand placement and controlling speed.  Outcome Measures:  [facility] Basic Mobility  Turning from your back to your side while in a flat bed without using bedrails: A little  Moving from lying on your back to sitting on the side of a flat bed without using bedrails: A lot  Moving to and from bed to chair (including a wheelchair): Total  Standing up from a chair using your arms (e.g. wheelchair or bedside chair): Total  To walk in hospital room: Total  Climbing 3-5 steps with railing: Total  Basic Mobility - Total Score: 9    FSS-ICU  Ambulation: Walks <50 feet with any assistance x1 or walks any distance with assistance x2 people  Rolling: Minimal assistance (performs 75% or more of task)  Sitting: Minimal assistance (performs 75% or more of task)  Transfer Sit-to-Stand: Total assistance (performs 25% or requires another person)  Transfer Supine-to-Sit: Moderate assistance (performs 50 - 74% of task)  Total Score: 13      Early Mobility/Exercise Safety Screen: Proceed with mobilization - No exclusion criteria met  ICU Mobility Scale: Transferring bed to chair [5]  E = Exercise and Early Mobility  Early Mobility/Exercise Safety Screen: Proceed with mobilization - No exclusion criteria met  ICU Mobility Scale: Transferring bed to chair    Encounter Problems        Encounter Problems (Active)       PT Problem       Patient will perform bed mobility with </= CGA to reduce risk of developing decubitus ulcers.        Start:  06/27/24    Expected End:  07/11/24            Patient will perform sit to stand and stand to sit transfers with </= MIN A x1 and LRD to increase functional strength.        Start:  06/27/24    Expected End:  07/11/24            Patient will ambulate at least 30  ft. with </= MIN A x1 and LRD to improve tolerance of community distances.          Start:  06/27/24    Expected End:  07/11/24            Patient will perform home exercise program as prescribed with cues as needed.         Start:  06/27/24    Expected End:  07/11/24            LLE >/= 4+/5 throughout       Start:  06/27/24    Expected End:  07/11/24                   Education Documentation  Precautions, taught by Abby Rodriguez PT at 6/27/2024 10:39 AM.  Learner: Significant Other, Family, Patient  Readiness: Acceptance  Method: Explanation  Response: Verbalizes Understanding    Body Mechanics, taught by Abby Rodriguez PT at 6/27/2024 10:39 AM.  Learner: Significant Other, Family, Patient  Readiness: Acceptance  Method: Explanation  Response: Verbalizes Understanding    Home Exercise Program, taught by Abby Rodriguez PT at 6/27/2024 10:39 AM.  Learner: Significant Other, Family, Patient  Readiness: Acceptance  Method: Explanation  Response: Verbalizes Understanding    Mobility Training, taught by Abby Rodriguez PT at 6/27/2024 10:39 AM.  Learner: Significant Other, Family, Patient  Readiness: Acceptance  Method: Explanation  Response: Verbalizes Understanding    Education Comments  No comments found.      06/27/24  at 10:40 AM   Abby Rodriguez, PT   Rehab Office: [phone]

## 2024-06-27 NOTE — CARE PLAN
The patient's goals for the shift include      The clinical goals for the shift include      Over the shift, the patient did not make progress toward the following goals. Barriers to progression include acuity of ilness. Recommendations to address these barriers include plan of care.

## 2024-06-27 NOTE — SIGNIFICANT EVENT
Stable ICH on serial imaging. Remainder of care per stroke service, we will sign off. Please message or page [phone] with questions or concerns.

## 2024-06-27 NOTE — CARE PLAN
Problem: General Stroke  Goal: Establish a mutual long term goal with patient by discharge  Outcome: Progressing  Goal: Demonstrate improvement in neurological exam throughout the shift  Outcome: Progressing  Goal: Maintain BP within ordered limits throughout shift  Outcome: Progressing  Goal: Participate in treatment (ie., meds, therapy) throughout shift  Outcome: Progressing  Goal: No symptoms of aspiration throughout shift  Outcome: Progressing  Goal: No symptoms of hemorrhage throughout shift  Outcome: Progressing  Goal: Tolerate enteral feeding throughout shift  Outcome: Progressing  Goal: Decreased nausea/vomiting throughout shift  Outcome: Progressing  Goal: Controlled blood glucose throughout shift  Outcome: Progressing  Goal: Out of bed three times today  Outcome: Progressing     Problem: ICU Stroke  Goal: Maintain ICP within ordered limits throughout shift  Outcome: Progressing  Goal: Tolerate EVD clamping trial throughout shift  Outcome: Progressing  Goal: Tolerate ventilator weaning trial during shift  Outcome: Progressing  Goal: Maintain patent airway throughout shift  Outcome: Progressing  Goal: Achieve/maintain targeted sodium level throughout shift  Outcome: Progressing

## 2024-06-27 NOTE — SIGNIFICANT EVENT
Discussed with pt that he failed speech assessment this AM but has high likelihood for passing tomorrow, pt aggreable on holding on DHT placement for now. He has full capacity.     Discussed with pt that starting him on his PO meds still has some aspiration risks which could lead to pneumonia, however the benefit will be earlier titration and control of his BP which can lead to quicker discharge. I explained that other options include placing a [place] or monitoring his BP and addressing it with PRN BP meds which will be temporary. He understood all options and agrees that he would prefer to take the risk to better control his BP.     Therefore we will make him NPO and only allow BP meds with small sips of water and ice chips. Until test tomorrow.    Me and the RN Richard Stringer was present with the conversation.    Discussed with Dr. Adam

## 2024-06-27 NOTE — PROGRESS NOTES
"Jordon Waddell is a 73 y.o. male on day 1 of admission presenting with Intracranial hemorrhage (Multi).    Subjective   Off cardene since 1AM, seen this AM, feels ok, still bothered by inability to move LUE. Wants to try speech assessment as he thinks his swallowing is improving. Did not require PRNs overnight.      Objective   Heart Rate:  [64-95]   Temp:  [35.5 °C (95.9 °F)-36.9 °C (98.4 °F)]   Resp:  [10-21]   BP: (107-167)/()   Height:  [170.2 cm (5' 7\")]   Weight:  [103 kg (227 lb 4.7 oz)-104 kg (229 lb 8 oz)]   SpO2:  [90 %-99 %]       Physical Exam  Neurological Exam  CV : RRR, Chest: CTAB, GI : soft, non tender, non distended  Mental status: Oriented x4, converses normally with intact language domains. Following simple and complex commands.  CN: Pupils equal and reactive, VF intact, EOM intact, L upper and lower face droop, no dysarthria.  Motor: Rt side 5/5, LUE flicker finger movements, LLE AG with no drift, minimal knee flexion, dorsi/plantar flexion 5/5.  Sensory: intact in all extremities with no eglect  Coordination intact FTN In LUE  Gait Deferred.    Relevant Results    NIH Stroke Scale  1A. Level of Consciousness: Alert, Keenly Responsive  1B. Ask Month and Age: Both Questions Right  1C. Blink Eyes & Squeeze Hands: Performs Both Tasks  2. Best Gaze: Normal  3. Visual: No Visual Loss  4. Facial Palsy: Partial Paralysis  5A. Motor - Left Arm: No Movement  5B. Motor - Right Arm: No Drift  6A. Motor - Left Leg: Drift  6B. Motor - Right Leg: No Drift  7. Limb Ataxia: Absent  8. Sensory Loss: Normal  9. Best Language: No Aphasia  10. Dysarthria: Normal  11. Extinction and Inattention: No Abnormality  NIH Stroke Scale: 7       Assessment/Plan      Principal Problem:    Intracranial hemorrhage (Multi)  Jordon Waddell is a 73 y.o.  right handed male with a PMHx of HTN, HLD, DM, CKD2, who presents with R thalamic ICH, slightly enlarged on first repeat CHT, stable on third repeat CTH. Initial /86.  " Likely hypertensive in etiology. Admitted for further management.     #R thalamic ICH  #HTN, HLD  - MARCIO, consider downgrade to floor in afternoon  - Hold home BP meds: HCTZ 12.5mg BID  - SBP goal 130-150  - Hold home ASA  - SLP, failed, high chance of improving -> repeat tomorrow, will hold off on DHT. Will keep NPO with ice chips and BP PO meds with small sips of water.  - Start lisinopril 20mg daily, Amlodipine 5mg daily (may add home meds as needed)  - c/w Rosuvastatin 40mg  - TTE pending    #DM2  - hold home meds  - ISS and hypoglycemia protocol    #CKD2 - stable Cr    #Dispo: pending PT/OT    DVT ppx: SCDs, SQH pbd2  Full code        Praful Hodge MD

## 2024-06-27 NOTE — PROGRESS NOTES
Occupational Therapy    Evaluation    Patient Name: Jordon Waddell  MRN: [id number]  Today's Date: 6/27/2024  Room: 15/15  Time Calculation  Start Time: 1133  Stop Time: 1202  Time Calculation (min): 29 min    Assessment  IP OT Assessment  OT Assessment: Pt is a 73 year old male who demonstrates decreased strength, balance, activity tolerance, and coordination, which impedes occupational performance.  Prognosis: Good  Barriers to Discharge: Inaccessible home environment  Evaluation/Treatment Tolerance: Patient tolerated treatment well  Medical Staff Made Aware: Yes  End of Session Communication: Bedside nurse  End of Session Patient Position: Bed, 3 rail up, Alarm off, caregiver present    Plan:  Inpatient Plan  Treatment Interventions: ADL retraining, Functional transfer training, UE strengthening/ROM, Endurance training, Patient/family training, Equipment evaluation/education, Neuromuscular reeducation, Fine motor coordination activities, Compensatory technique education  OT Frequency: 4 times per week  OT Discharge Recommendations: High intensity level of continued care  Equipment Recommended upon Discharge:  (TBD)  OT Recommended Transfer Status: Assist of 2  OT - OK to Discharge: Yes  OT Assessment  OT Assessment Results: Decreased ADL status, Decreased upper extremity strength, Decreased endurance, Decreased fine motor control, Decreased functional mobility, Decreased gross motor control, Decreased IADLs, Non-functional left upper extremity  Prognosis: Good  Barriers to Discharge: Inaccessible home environment  Evaluation/Treatment Tolerance: Patient tolerated treatment well  Medical Staff Made Aware: Yes    Subjective   Current Problem:  1. Intracranial hemorrhage (Multi)  Transthoracic Echo (TTE) Complete    Transthoracic Echo (TTE) Complete      2. Cerebral infarction due to unspecified occlusion or stenosis of right posterior cerebral artery (Multi)  Transthoracic Echo (TTE) Complete    Transthoracic Echo  (TTE) Complete        General:  Reason for Referral: Pt p/w sudden onset left sided weakness and L sided FD after fall w/o LOC. Found to have R BG ICH ~1.4 ml at 0900 6/26/24. Expanded to 2.9 ml at 1230.  No acute neurosurgical intervention. Likely hypertensive etiology. [facility] 6/26 2141 stable. GCS 15, NIHSS 7.  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     Precautions:  Hearing/Visual Limitations: Glasses. Hearing WFL  Medical Precautions: Fall precautions  Precautions Comment: -150    Vital Signs:  Heart Rate: 85 (post: 80)  Resp: 19 (post: 15)  SpO2: 97 % (post: 95)  BP: 140/60 (SBP ranging 140s-160s;RN aware; Post: 142/83)    Pain:  Pain Assessment  Pain Assessment:  (reported L shoulder pain; not quantified)    Lines/Tubes/Drains:  External Urinary Catheter (Active)   Number of days: 0         Objective   Cognition:  Overall Cognitive Status: Within Functional Limits  Orientation Level: Oriented X4  Insight: Within function limits  Impulsive: Within functional limits  Processing Speed: Within funtional limits     Confusion Assessment Method (CAM)  Acute Onset and Fluctuating Course (1A): No     Home Living:  Type of Home: House  Lives With: Spouse  Home Adaptive Equipment: Walker rolling or standard  Home Layout: One level  Home Access: Stairs to enter with rails, Ramped entrance  Entrance Stairs-Number of Steps: 3+1 front. Ramp back  Bathroom Shower/Tub: Tub/shower unit  Bathroom Equipment: Grab bars around toilet, Raised toilet seat without rails     Prior Function:  Level of [place]: Independent with ADLs and  functional transfers, Independent with homemaking with ambulation  ADL Assistance: Independent  Homemaking Assistance: Independent  Ambulatory Assistance: Independent  Vocational: Full time employment ([employer])  Hand Dominance: Right  Prior Function Comments: Drives, community ambulator, denies history of falling prior to this.     ADL:  Eating Assistance: Stand by  Eating Deficit: Setup  Grooming Assistance: Stand by  Grooming Deficit: Setup  Bathing Assistance: Moderate  UE Dressing Assistance: Moderate  LE Dressing Assistance: Maximal  Toileting Assistance with Device: Maximal    Activity Tolerance:  Endurance: Endurance does not limit participation in activity  Early Mobility/Exercise Safety Screen: Proceed with mobilization - No exclusion criteria met    Balance:  Static Sitting Balance  Static Sitting-Level of Assistance: Moderate assistance  Static Sitting-Comment/Number of Minutes: ~2 minutes after return from chair    Bed Mobility/Transfers: Bed Mobility  Bed Mobility: Yes  Bed Mobility 1  Bed Mobility 1: Sitting to supine  Level of Assistance 1: Maximum assistance (x2)   and Transfers  Transfer: Yes  Transfer 1  Transfer From 1: Chair with drop arm to  Transfer to 1: Bed  Transfer Device 1: Gait belt  Transfer Level of Assistance 1: Moderate assistance (x2)  Trials/Comments 1: x1 stand pivot + additional scoot. RUE arm in arm assist, L knee block and LUE guarded.    Vision: Vision - Basic Assessment  Current Vision: Wears glasses all the time   and Vision - Complex Assessment  Ocular Range of Motion: Within Functional Limits  Tracking: WFL    Sensation:  Light Touch: No apparent deficits  Proprioception: No apparent deficits     Perception:  Inattention/Neglect: Appears intact    Coordination:  Movements are Fluid and Coordinated: Yes (to RUE; no to LUE)      Extremities: RUE   RUE : Within Functional Limits (5/5), LUE   LUE: Exceptions to WFL  LUE Strength  LUE Overall Strength:  (5th digit 2-/5, otherwise  fingers 0/5)  L Shoulder Flexion: 1/5  L Shoulder ABduction: 2-/5  L Shoulder Internal Rotation: 2-/5  L Shoulder External Rotation: 0/5  L Elbow Flexion: 2/5  L Elbow Extension: 0/5  L Forearm Pronation: 1/5  L Forearm Supination: 1/5  L Wrist Flexion: 0/5  L Wrist Extension: 0/5,       Outcome Measures: [facility] Daily Activity  Putting on and taking off regular lower body clothing: A lot  Bathing (including washing, rinsing, drying): A lot  Putting on and taking off regular upper body clothing: A lot  Toileting, which includes using toilet, bedpan or urinal: A lot  Taking care of personal grooming such as brushing teeth: A little  Eating Meals: A little  Daily Activity - Total Score: 14        ICU Mobility Screen  Early Mobility/Exercise Safety Screen: Proceed with mobilization - No exclusion criteria met  ICU Mobility Scale: Transferring bed to chair,          Education Documentation  Body Mechanics, taught by Nakia Dewey OT at 6/27/2024  1:15 PM.  Learner: Patient  Readiness: Acceptance  Method: Explanation, Demonstration  Response: Verbalizes Understanding, Demonstrated Understanding    Precautions, taught by Nakia Dewey OT at 6/27/2024  1:15 PM.  Learner: Patient  Readiness: Acceptance  Method: Explanation, Demonstration  Response: Verbalizes Understanding, Demonstrated Understanding    Education Comments  No comments found.        Goals:     Encounter Problems       Encounter Problems (Active)       ADLs       Patient with complete upper body dressing with stand by assist level of assistance donning and doffing all UE clothes with PRN adaptive equipment and compensatory technique        Start:  06/27/24    Expected End:  07/11/24            Patient with complete lower body dressing with minimal assist  level of assistance donning and doffing all LE clothes  with PRN adaptive equipment       Start:  06/27/24    Expected End:  07/11/24            Patient will feed self with modified  independent level of assistance and using PRN adaptive equipment and compensatory technique       Start:  06/27/24    Expected End:  07/11/24            Patient will complete daily grooming tasks with modified independent level of assistance and PRN adaptive equipment and compensatory technique.       Start:  06/27/24    Expected End:  07/11/24               EXERCISE/STRENGTHENING       Patient will be educated on LUE HEP for increased ADL performance.       Start:  06/27/24    Expected End:  07/11/24            Pt will increase LUE strength for optimal participation in ADLs/IADLs and functional mobility.        Start:  06/27/24    Expected End:  07/11/24               TRANSFERS       Patient will complete functional transfer to chair with least restrictive device with minimal assist  level of assistance.       Start:  06/27/24    Expected End:  07/11/24 06/27/24 at 1:17 PM   Nakia Dewey, OT   Rehab Office: [phone]

## 2024-06-27 NOTE — PROGRESS NOTES
Patient is discussed during interdisciplinary round today.     Plan per medical team : Pt is not medically ready to be discharged currently.   Planned disposition : High intensity  Discharge destination : TBD  Payor : [employer] SW met with pt and pt's wife at bedside to complete initial assessment for offering support and obtaining information for pt's discharge plan. Pt is alert and fully oriented.   Pt lives with his wife and an adult daughter in a 1-story house. Pt is fully independent and drives himself, no need of DME. Pt had his surgery 2 years ago, but pt denied any using of cane or walker. Pt's wife is a main caregiver for pt, and wife can take care of pt 24/7.   Pt is recommended for high intensity currently, pt is agreeable with his dc recommendation. Pt and wife made a choice to [facility], SW will continue to follow and assist with discharge plan.     [UPDATE] 4:40pm, Referral was sent to [facility], and they are reviewing.     STUART Desai, W       06/27/24 2904   Discharge Planning   Living Arrangements Spouse/significant other;Children  (Pt lives with his wife and his adult daughter.)   Support Systems Spouse/significant other;Children   Assistance Needed independent prior to this admission   Type of Residence Private residence   Number of Stairs to Enter Residence 3   Do you have animals or pets at home? Yes   Type of Animals or Pets two dogs   Who is requesting discharge planning? Provider   Home or Post Acute Services Other (Comment)  (TBD)   Patient expects to be discharged to: Rehab   Does the patient need discharge transport arranged? Yes   RoundTrip coordination needed? Yes

## 2024-06-28 ENCOUNTER — APPOINTMENT (OUTPATIENT)
Dept: CARDIOLOGY | Facility: HOSPITAL | Age: 74
DRG: 064 | End: 2024-06-28
Payer: MEDICARE

## 2024-06-28 ENCOUNTER — APPOINTMENT (OUTPATIENT)
Dept: RADIOLOGY | Facility: HOSPITAL | Age: 74
DRG: 064 | End: 2024-06-28
Payer: MEDICARE

## 2024-06-28 LAB
ALBUMIN SERPL BCP-MCNC: 4.5 G/DL (ref 3.4–5)
ANION GAP SERPL CALC-SCNC: 17 MMOL/L (ref 10–20)
APPEARANCE UR: CLEAR
ATRIAL RATE: 90 BPM
BASOPHILS # BLD AUTO: 0.03 X10*3/UL (ref 0–0.1)
BASOPHILS # BLD AUTO: 0.03 X10*3/UL (ref 0–0.1)
BASOPHILS NFR BLD AUTO: 0.4 %
BASOPHILS NFR BLD AUTO: 0.4 %
BILIRUB UR STRIP.AUTO-MCNC: NEGATIVE MG/DL
BUN SERPL-MCNC: 23 MG/DL (ref 6–23)
CA-I BLD-SCNC: 1.24 MMOL/L (ref 1.1–1.33)
CALCIUM SERPL-MCNC: 10 MG/DL (ref 8.6–10.6)
CHLORIDE SERPL-SCNC: 100 MMOL/L (ref 98–107)
CO2 SERPL-SCNC: 23 MMOL/L (ref 21–32)
COLOR UR: ABNORMAL
CREAT SERPL-MCNC: 1.11 MG/DL (ref 0.5–1.3)
EGFRCR SERPLBLD CKD-EPI 2021: 71 ML/MIN/1.73M*2
EOSINOPHIL # BLD AUTO: 0.07 X10*3/UL (ref 0–0.4)
EOSINOPHIL # BLD AUTO: 0.07 X10*3/UL (ref 0–0.4)
EOSINOPHIL NFR BLD AUTO: 0.8 %
EOSINOPHIL NFR BLD AUTO: 0.9 %
ERYTHROCYTE [DISTWIDTH] IN BLOOD BY AUTOMATED COUNT: 13.2 % (ref 11.5–14.5)
ERYTHROCYTE [DISTWIDTH] IN BLOOD BY AUTOMATED COUNT: 13.2 % (ref 11.5–14.5)
GLUCOSE BLD MANUAL STRIP-MCNC: 115 MG/DL (ref 74–99)
GLUCOSE BLD MANUAL STRIP-MCNC: 136 MG/DL (ref 74–99)
GLUCOSE BLD MANUAL STRIP-MCNC: 145 MG/DL (ref 74–99)
GLUCOSE BLD MANUAL STRIP-MCNC: 161 MG/DL (ref 74–99)
GLUCOSE BLD MANUAL STRIP-MCNC: 167 MG/DL (ref 74–99)
GLUCOSE BLD MANUAL STRIP-MCNC: 187 MG/DL (ref 74–99)
GLUCOSE BLD MANUAL STRIP-MCNC: 203 MG/DL (ref 74–99)
GLUCOSE SERPL-MCNC: 144 MG/DL (ref 74–99)
GLUCOSE UR STRIP.AUTO-MCNC: NORMAL MG/DL
HCT VFR BLD AUTO: 43.6 % (ref 41–52)
HCT VFR BLD AUTO: 44.8 % (ref 41–52)
HGB BLD-MCNC: 14.3 G/DL (ref 13.5–17.5)
HGB BLD-MCNC: 14.9 G/DL (ref 13.5–17.5)
IMM GRANULOCYTES # BLD AUTO: 0.03 X10*3/UL (ref 0–0.5)
IMM GRANULOCYTES # BLD AUTO: 0.05 X10*3/UL (ref 0–0.5)
IMM GRANULOCYTES NFR BLD AUTO: 0.4 % (ref 0–0.9)
IMM GRANULOCYTES NFR BLD AUTO: 0.6 % (ref 0–0.9)
KETONES UR STRIP.AUTO-MCNC: ABNORMAL MG/DL
LEGIONELLA AG UR QL: NEGATIVE
LEUKOCYTE ESTERASE UR QL STRIP.AUTO: NEGATIVE
LYMPHOCYTES # BLD AUTO: 1.28 X10*3/UL (ref 0.8–3)
LYMPHOCYTES # BLD AUTO: 1.29 X10*3/UL (ref 0.8–3)
LYMPHOCYTES NFR BLD AUTO: 14.9 %
LYMPHOCYTES NFR BLD AUTO: 16.4 %
MAGNESIUM SERPL-MCNC: 2.13 MG/DL (ref 1.6–2.4)
MCH RBC QN AUTO: 29.2 PG (ref 26–34)
MCH RBC QN AUTO: 29.7 PG (ref 26–34)
MCHC RBC AUTO-ENTMCNC: 32.8 G/DL (ref 32–36)
MCHC RBC AUTO-ENTMCNC: 33.3 G/DL (ref 32–36)
MCV RBC AUTO: 89 FL (ref 80–100)
MCV RBC AUTO: 89 FL (ref 80–100)
MONOCYTES # BLD AUTO: 0.97 X10*3/UL (ref 0.05–0.8)
MONOCYTES # BLD AUTO: 1.03 X10*3/UL (ref 0.05–0.8)
MONOCYTES NFR BLD AUTO: 12 %
MONOCYTES NFR BLD AUTO: 12.3 %
MUCOUS THREADS #/AREA URNS AUTO: NORMAL /LPF
NEUTROPHILS # BLD AUTO: 5.49 X10*3/UL (ref 1.6–5.5)
NEUTROPHILS # BLD AUTO: 6.11 X10*3/UL (ref 1.6–5.5)
NEUTROPHILS NFR BLD AUTO: 69.6 %
NEUTROPHILS NFR BLD AUTO: 71.3 %
NITRITE UR QL STRIP.AUTO: NEGATIVE
NRBC BLD-RTO: 0 /100 WBCS (ref 0–0)
NRBC BLD-RTO: 0 /100 WBCS (ref 0–0)
P AXIS: 33 DEGREES
P OFFSET: 192 MS
P ONSET: 141 MS
PH UR STRIP.AUTO: 6 [PH]
PHOSPHATE SERPL-MCNC: 2.5 MG/DL (ref 2.5–4.9)
PLATELET # BLD AUTO: 161 X10*3/UL (ref 150–450)
PLATELET # BLD AUTO: 190 X10*3/UL (ref 150–450)
POTASSIUM SERPL-SCNC: 4.2 MMOL/L (ref 3.5–5.3)
PR INTERVAL: 160 MS
PROCALCITONIN SERPL-MCNC: 0.04 NG/ML
PROT UR STRIP.AUTO-MCNC: ABNORMAL MG/DL
Q ONSET: 221 MS
QRS COUNT: 15 BEATS
QRS DURATION: 70 MS
QT INTERVAL: 372 MS
QTC CALCULATION(BAZETT): 455 MS
QTC FREDERICIA: 425 MS
R AXIS: 17 DEGREES
RBC # BLD AUTO: 4.9 X10*6/UL (ref 4.5–5.9)
RBC # BLD AUTO: 5.02 X10*6/UL (ref 4.5–5.9)
RBC # UR STRIP.AUTO: NEGATIVE /UL
RBC #/AREA URNS AUTO: NORMAL /HPF
S PNEUM AG UR QL: NEGATIVE
SODIUM SERPL-SCNC: 136 MMOL/L (ref 136–145)
SP GR UR STRIP.AUTO: 1.02
T AXIS: 80 DEGREES
T OFFSET: 407 MS
UROBILINOGEN UR STRIP.AUTO-MCNC: NORMAL MG/DL
VANCOMYCIN SERPL-MCNC: <2 UG/ML (ref 5–20)
VENTRICULAR RATE: 90 BPM
WBC # BLD AUTO: 7.9 X10*3/UL (ref 4.4–11.3)
WBC # BLD AUTO: 8.6 X10*3/UL (ref 4.4–11.3)
WBC #/AREA URNS AUTO: NORMAL /HPF

## 2024-06-28 PROCEDURE — 36415 COLL VENOUS BLD VENIPUNCTURE: CPT

## 2024-06-28 PROCEDURE — 2500000005 HC RX 250 GENERAL PHARMACY W/O HCPCS: Performed by: PATHOLOGY

## 2024-06-28 PROCEDURE — 2500000001 HC RX 250 WO HCPCS SELF ADMINISTERED DRUGS (ALT 637 FOR MEDICARE OP): Performed by: STUDENT IN AN ORGANIZED HEALTH CARE EDUCATION/TRAINING PROGRAM

## 2024-06-28 PROCEDURE — 87449 NOS EACH ORGANISM AG IA: CPT | Performed by: STUDENT IN AN ORGANIZED HEALTH CARE EDUCATION/TRAINING PROGRAM

## 2024-06-28 PROCEDURE — 2500000004 HC RX 250 GENERAL PHARMACY W/ HCPCS (ALT 636 FOR OP/ED): Performed by: PATHOLOGY

## 2024-06-28 PROCEDURE — 2500000002 HC RX 250 W HCPCS SELF ADMINISTERED DRUGS (ALT 637 FOR MEDICARE OP, ALT 636 FOR OP/ED)

## 2024-06-28 PROCEDURE — 92611 MOTION FLUOROSCOPY/SWALLOW: CPT | Mod: GN

## 2024-06-28 PROCEDURE — 93005 ELECTROCARDIOGRAM TRACING: CPT

## 2024-06-28 PROCEDURE — 84145 PROCALCITONIN (PCT): CPT | Performed by: STUDENT IN AN ORGANIZED HEALTH CARE EDUCATION/TRAINING PROGRAM

## 2024-06-28 PROCEDURE — 99233 SBSQ HOSP IP/OBS HIGH 50: CPT

## 2024-06-28 PROCEDURE — 92526 ORAL FUNCTION THERAPY: CPT | Mod: GN

## 2024-06-28 PROCEDURE — 2500000002 HC RX 250 W HCPCS SELF ADMINISTERED DRUGS (ALT 637 FOR MEDICARE OP, ALT 636 FOR OP/ED): Performed by: STUDENT IN AN ORGANIZED HEALTH CARE EDUCATION/TRAINING PROGRAM

## 2024-06-28 PROCEDURE — 2500000005 HC RX 250 GENERAL PHARMACY W/O HCPCS: Performed by: PSYCHIATRY & NEUROLOGY

## 2024-06-28 PROCEDURE — 2500000004 HC RX 250 GENERAL PHARMACY W/ HCPCS (ALT 636 FOR OP/ED)

## 2024-06-28 PROCEDURE — 74230 X-RAY XM SWLNG FUNCJ C+: CPT

## 2024-06-28 PROCEDURE — 85025 COMPLETE CBC W/AUTO DIFF WBC: CPT | Mod: 91,PARLAB

## 2024-06-28 PROCEDURE — 80202 ASSAY OF VANCOMYCIN: CPT | Mod: PARLAB | Performed by: PATHOLOGY

## 2024-06-28 PROCEDURE — 74230 X-RAY XM SWLNG FUNCJ C+: CPT | Performed by: RADIOLOGY

## 2024-06-28 PROCEDURE — 83735 ASSAY OF MAGNESIUM: CPT

## 2024-06-28 PROCEDURE — 2500000001 HC RX 250 WO HCPCS SELF ADMINISTERED DRUGS (ALT 637 FOR MEDICARE OP)

## 2024-06-28 PROCEDURE — 82947 ASSAY GLUCOSE BLOOD QUANT: CPT

## 2024-06-28 PROCEDURE — 81001 URINALYSIS AUTO W/SCOPE: CPT | Performed by: STUDENT IN AN ORGANIZED HEALTH CARE EDUCATION/TRAINING PROGRAM

## 2024-06-28 PROCEDURE — 87899 AGENT NOS ASSAY W/OPTIC: CPT | Performed by: STUDENT IN AN ORGANIZED HEALTH CARE EDUCATION/TRAINING PROGRAM

## 2024-06-28 PROCEDURE — 82947 ASSAY GLUCOSE BLOOD QUANT: CPT | Mod: 91

## 2024-06-28 PROCEDURE — 80069 RENAL FUNCTION PANEL: CPT

## 2024-06-28 PROCEDURE — 1100000001 HC PRIVATE ROOM DAILY

## 2024-06-28 PROCEDURE — 85025 COMPLETE CBC W/AUTO DIFF WBC: CPT

## 2024-06-28 PROCEDURE — 82330 ASSAY OF CALCIUM: CPT

## 2024-06-28 RX ORDER — VANCOMYCIN HYDROCHLORIDE 1 G/20ML
INJECTION, POWDER, LYOPHILIZED, FOR SOLUTION INTRAVENOUS DAILY PRN
Status: DISPENSED | OUTPATIENT
Start: 2024-06-28

## 2024-06-28 RX ORDER — AMLODIPINE BESYLATE 5 MG/1
5 TABLET ORAL ONCE
Status: COMPLETED | OUTPATIENT
Start: 2024-06-28 | End: 2024-06-28

## 2024-06-28 RX ORDER — AMLODIPINE BESYLATE 10 MG/1
10 TABLET ORAL DAILY
Status: DISPENSED | OUTPATIENT
Start: 2024-06-29

## 2024-06-28 RX ORDER — LISINOPRIL 20 MG/1
20 TABLET ORAL 2 TIMES DAILY
Status: DISPENSED | OUTPATIENT
Start: 2024-06-28

## 2024-06-28 RX ORDER — HEPARIN SODIUM 5000 [USP'U]/ML
5000 INJECTION, SOLUTION INTRAVENOUS; SUBCUTANEOUS EVERY 8 HOURS SCHEDULED
Status: DISPENSED | OUTPATIENT
Start: 2024-06-28

## 2024-06-28 RX ORDER — MEROPENEM 1 G/1
1 INJECTION, POWDER, FOR SOLUTION INTRAVENOUS EVERY 8 HOURS
Status: COMPLETED | OUTPATIENT
Start: 2024-06-28 | End: 2024-06-30

## 2024-06-28 ASSESSMENT — COGNITIVE AND FUNCTIONAL STATUS - GENERAL
WALKING IN HOSPITAL ROOM: TOTAL
EATING MEALS: A LITTLE
HELP NEEDED FOR BATHING: A LOT
TOILETING: A LOT
DAILY ACTIVITIY SCORE: 13
PATIENT BASELINE BEDBOUND: NO
MOVING FROM LYING ON BACK TO SITTING ON SIDE OF FLAT BED WITH BEDRAILS: A LITTLE
PERSONAL GROOMING: A LOT
PERSONAL GROOMING: A LOT
TOILETING: A LOT
WALKING IN HOSPITAL ROOM: TOTAL
WALKING IN HOSPITAL ROOM: TOTAL
CLIMB 3 TO 5 STEPS WITH RAILING: TOTAL
STANDING UP FROM CHAIR USING ARMS: A LOT
MOVING TO AND FROM BED TO CHAIR: A LOT
DRESSING REGULAR LOWER BODY CLOTHING: A LOT
MOVING FROM LYING ON BACK TO SITTING ON SIDE OF FLAT BED WITH BEDRAILS: A LITTLE
EATING MEALS: A LITTLE
MOVING TO AND FROM BED TO CHAIR: A LOT
PERSONAL GROOMING: A LOT
TURNING FROM BACK TO SIDE WHILE IN FLAT BAD: A LITTLE
TURNING FROM BACK TO SIDE WHILE IN FLAT BAD: A LOT
TURNING FROM BACK TO SIDE WHILE IN FLAT BAD: A LITTLE
HELP NEEDED FOR BATHING: A LOT
STANDING UP FROM CHAIR USING ARMS: A LOT
MOBILITY SCORE: 11
MOBILITY SCORE: 11
CLIMB 3 TO 5 STEPS WITH RAILING: TOTAL
EATING MEALS: A LOT
DRESSING REGULAR LOWER BODY CLOTHING: A LOT
HELP NEEDED FOR BATHING: A LOT
DRESSING REGULAR UPPER BODY CLOTHING: A LOT
STANDING UP FROM CHAIR USING ARMS: TOTAL
DAILY ACTIVITIY SCORE: 12
DAILY ACTIVITIY SCORE: 13
DRESSING REGULAR UPPER BODY CLOTHING: A LOT
MOVING TO AND FROM BED TO CHAIR: A LOT
TOILETING: A LOT
DRESSING REGULAR UPPER BODY CLOTHING: A LOT
CLIMB 3 TO 5 STEPS WITH RAILING: TOTAL
MOVING FROM LYING ON BACK TO SITTING ON SIDE OF FLAT BED WITH BEDRAILS: A LITTLE
MOBILITY SCORE: 12
DRESSING REGULAR LOWER BODY CLOTHING: A LOT

## 2024-06-28 ASSESSMENT — PAIN - FUNCTIONAL ASSESSMENT: PAIN_FUNCTIONAL_ASSESSMENT: 0-10

## 2024-06-28 ASSESSMENT — ACTIVITIES OF DAILY LIVING (ADL)
GROOMING: NEEDS ASSISTANCE
ADEQUATE_TO_COMPLETE_ADL: YES
HEARING - LEFT EAR: FUNCTIONAL
BATHING: NEEDS ASSISTANCE
PATIENT'S MEMORY ADEQUATE TO SAFELY COMPLETE DAILY ACTIVITIES?: YES
HEARING - RIGHT EAR: FUNCTIONAL
WALKS IN HOME: NEEDS ASSISTANCE
DRESSING YOURSELF: NEEDS ASSISTANCE
JUDGMENT_ADEQUATE_SAFELY_COMPLETE_DAILY_ACTIVITIES: YES
TOILETING: NEEDS ASSISTANCE
FEEDING YOURSELF: NEEDS ASSISTANCE

## 2024-06-28 ASSESSMENT — PAIN SCALES - GENERAL
PAINLEVEL_OUTOF10: 0 - NO PAIN
PAINLEVEL_OUTOF10: 0 - NO PAIN

## 2024-06-28 NOTE — SIGNIFICANT EVENT
Discussed Code status with patient, he stated that in case of clinical deterioration or CP Arrest, he would be ok with resuscitation and intubation and would like to be Full Code, however he states he never wants prolonged tracheostomy if he reached that state.

## 2024-06-28 NOTE — PROCEDURES
"Speech-Language Pathology    SLP Adult Inpatient MBSS Evaluation    Patient Name: Jordon Waddell  MRN: [id number]  Today's Date: 6/28/2024   Time Calculation  Start Time: 1002  Stop Time: 1028  Time Calculation (min): 26 min     Assessment/Impression:   Mild/moderate oropharyngeal dysphagia characterized by reduced oral phase impact by L weakness, delayed swallow initiation, reduced TBR and pharyngeal stripping.  Consistent deep penetration during the swallow with thin liquids, not entirely ejected.  One instance of silent aspiration following the swallow.  Mild residue following the swallow at the valleculae with solids.      Full detailed SLP/Radiologist Modified Barium Swallow study report can be found under Chart Review tab, Imaging tab and  titled \"FL Modified Barium Swallow Study\"       Recommendations:  Solid Diet Recommendations : Soft & Bite Sized (IDDSI Level 6)  Liquid Diet Recommendations: Mildly/nectar thick (IDDSI Level 2)   Medication Administration: Per pt preference      Safe Swallow Strategies/Guidelines:  Only present PO intake when awake and alert  Supervision/assist w/ PO intake to assure adherence to safe swallow strategies   Upright positioning   Slow rate/small boluses         Plan:  SLP Plan: Skilled SLP warranted  SLP Frequency: 2x per week  Duration: 3 weeks      Discussed POC: Patient, Medical Team   Discussed Risks/Benefits: Yes  Patient/Caregiver Agreeable: Yes  Continued SLP services at next level of care        Goals:  Pt will tolerate least restrictive diet with adequate oral phase and no s/s of aspiration.   Date initiated: 6/28/24   Status: Goal initiated     Pt will adhere to safe swallow strategies during PO intake with > 90% accuracy independently.      Date initiated: 6/28/24   Status: Goal initiated    Pt will complete 10 reps of pharyngeal strengthening exercises x3 during session with good effort and efficiency given min cues for improved swallow safety and efficiency    Date " initiated: 6/28/24   Status: Goal initiated       Subjective:  Pt arrived via transport for MBSS.  Verbally consented to the exam and tolerated with no s/s of distress.  Room air.  No c/o pain.       Relevant SLP Hx:  Pt admitted w/ L weakness and dysarthria- R thalamic ICH.  S/s of aspiration during bedside CSE.  Further evaluation warranted via MBSS.            Objective:   General Visit Information:  Type of Study: Initial MBS  Consistencies Trialed: Thin (IDDSI Level 0) - Spoon and Straw, Mildly/Nectat Thick (IDDSI Level 2)- Straw, Pureed/extremely thick (IDDSI Level 4), Minced & moist/ground (IDDSI Level 5), Regular (IDDSI Level 7)  View: Lateral      Oral/Motor Assessment:  Oral Hygiene: WFL  Dentition: Present and adequate    Oral Motor: L asymmetry   Vocal Quality (Perceptually): WFL         Oral Phase:     Adequate bolus acceptance.  Mastication functional.  Reduced bolus formation/transit.  Min oral residue with regular solids- sensate, able to clear with verbal cue.      Pharyngeal Phase:    Delayed swallow initiation with spillage to the piriforms with thin liquids; initiated at the valleculae and occasionally piriforms with nectar thick liquids; initiated at the valleculae with solids.  Mistimed laryngeal vestibule closure resulting in consistent deep penetration with thin liquids.  Not entirely retrieved upon swallow completion.  One instance of silent aspiration following the swallow as a result.  Cleared with cue to cough.  Improved bolus control with nectar thick liquids.  Only occasional trace, transient penetration noted.  No airway invasion noted with solids.  Mild residue at the valleculae following the swallow with solids.  Cleared with subsequent swallows.  Reduced TBR and pharyngeal stripping.  Compensatory strategies did not improve swallowing safety/efficiency.  Adequate PES function.         [place]:  Thin: 8 - Material enters airway, passes below the folds, no effort to eject (no  cough).  Nectar: 2 - Material enters airway, remains above the folds, ejected from airway.  Puree: 1 - Material does not enter airway.   Solids: 1 - Material does not enter airway.     Education:  SLP provided education re: results and recommendations following MBSS including diet recommendations, safe swallow strategies, and plan for tx.  Understanding indicated.  Updated medical team.      06/28/24 at 11:22 AM - Nell Jasmine, FELTON   06/28/24 at 11:21 AM - Nell Jasmine, SLP

## 2024-06-28 NOTE — PROGRESS NOTES
Jordon Waddell is a 73 y.o. male on day 2 of admission presenting with Intracranial hemorrhage (Multi).    Subjective   Had a max Temp to 38.1, CXR normal, UA unremarkable, Bcx pending. Not started on Abx.    Required 3 PRNs overnight for BP.     This AM, pt feels fine this AM, denies LUTS, SOB, or coughs, , He did not feel warm or sick despite high temperature.      Objective   Heart Rate:  []   Temp:  [36.1 °C (97 °F)-38.1 °C (100.6 °F)]   Resp:  [4-24]   BP: (136-178)/()   SpO2:  [94 %-98 %]       Physical Exam  Neurological Exam  CV : RRR, Chest: CTAB, GI : soft, non tender, non distended  Mental status: Oriented x4, converses normally with intact language domains. Following simple and complex commands.  CN: Pupils equal and reactive, VF intact, EOM intact, L upper and lower face droop, no dysarthria.  Motor: Rt side 5/5, LUE flicker finger movements, LLE AG with no drift, minimal knee flexion, dorsi/plantar flexion 5/5.  Sensory: intact in all extremities with no eglect  Coordination intact FTN In LUE  Gait Deferred.    Legs: not swollen with with no tenderness    Relevant Results    NIH Stroke Scale  1A. Level of Consciousness: Alert, Keenly Responsive  1B. Ask Month and Age: Both Questions Right  1C. Blink Eyes & Squeeze Hands: Performs Both Tasks  2. Best Gaze: Normal  3. Visual: No Visual Loss  4. Facial Palsy: Partial Paralysis  5A. Motor - Left Arm: No Movement  5B. Motor - Right Arm: No Drift  6A. Motor - Left Leg: Drift  6B. Motor - Right Leg: No Drift  7. Limb Ataxia: Absent  8. Sensory Loss: Normal  9. Best Language: No Aphasia  10. Dysarthria: Normal  11. Extinction and Inattention: No Abnormality  NIH Stroke Scale: 7       Assessment/Plan      Principal Problem:    Intracranial hemorrhage (Multi)  Jordon Waddell is a 73 y.o.  right handed male with a PMHx of HTN, HLD, DM, CKD2, who presents with R thalamic ICH, slightly enlarged on first repeat CHT, stable on third repeat CTH. Initial SBP  175/86.  Likely hypertensive in etiology. Admitted for further management. TTE EF 50-55%, no PFO, LA not well sen. Failed SLPP, pending MBSS    #R thalamic ICH  #HTN, HLD  :: Hold home BP meds: HCTZ 12.5mg BID  - Tele  - SBP goal 130-150  - Hold home ASA  - increase lisinopril ot 20mg BID, Amlodipine 5mg.   - c/w Rosuvastatin 40mg  - SLP failed x2 -> MBSS -> passed for soft-bite sized and mild thick liquids.      #Elevated temp  :: Max 38.1, downtrended  :: Asmyptomatic  - Hold tylenol if possible  - CXR/UA unremarkable  [ ] follow WBC, procal, BCx      #DM2  - hold home meds  - ISS and hypoglycemia protocol    #CKD2 - stable Cr    #Dispo: IAR per PT/OT.    DVT ppx: SCDs, SQH Start 6/28.  Code: Full code      Praful Hodge MD

## 2024-06-28 NOTE — PROGRESS NOTES
Speech-Language Pathology    SLP Adult Inpatient  Speech-Language Pathology Treatment     Patient Name: Jordon Waddell  MRN: [id number]  Today's Date: 6/28/2024  Time Calculation  Start Time: 0855  Stop Time: 0905  Time Calculation (min): 10 min         Assessment:  Suspected pharyngeal dysphagia s/p R thalamic ICH     Recommendations:  NPO     Modified Barium Swallow Study for further assessment of oropharyngeal swallow and to guide diet recommendations- scheduled for later this AM.      Frequent, aggressive oral care as tolerated to improve infection control     OK for small amounts of ice chips (one at a time, 10x/hour) for oral comfort and to prevent swallow disuse atrophy, but only after aggressive oral care to avoid colonization of bacteria within the oral cavity.        Plan:  SLP Plan: Skilled SLP  SLP Frequency: 2x per week  Duration:  4 weeks  Discussed POC: Pt, family, medical team      Goals:  Pt/family will indicate understanding of dysphagia education: risk for aspiration, recommendations, and POC with > 80% accuracy via teach back method.              Date initiated: 6/28/24              Status: Goal initiated         Subjective   RN cleared pt for tx.  Pt properly identified and evaluated.  Awake and alert, with no c/o pain.  Room air.  No family present.      Pt admitted w/ L weakness and dysarthria- R thalamic ICH.      Objective      Cognition:  Command Following: WFL   Attention: WFL   Orientation: Oriented x4        Oral/Motor Assessment:  Oral Hygiene: WFL   Dentition: Present and adequate   Oral Motor: L asymmetry   Voice (Perceptually): WFL   Volitional Cough (Perceptually): WFL   Volitional Swallow: WFL          Pt participated in PO trials of thin liquids via straw x3.  S/s of aspiration (strong cough) following 2/3 trials.  C/f pharyngeal dysphagia persists.  Recommend MBSS for further assessment.  SLP provided education re: purpose and what to expect.  Pt agreeable to completing.  Scheduled  for later this AM.        06/28/24 at 9:33 AM - Nell Jasmine, SLP

## 2024-06-28 NOTE — CONSULTS
Vancomycin Dosing by Pharmacy- INITIAL    Jordon Waddell is a 73 y.o. year old male who Pharmacy has been consulted for vancomycin dosing for pneumonia. Based on the patient's indication and renal status this patient will be dosed based on a goal AUC of 500-600.     Renal function is currently improving.    Visit Vitals  /76   Pulse 77   Temp (S) (!) 38.8 °C (101.8 °F)   Resp 18        Lab Results   Component Value Date    CREATININE 1.11 2024    CREATININE 1.21 2024    CREATININE 1.26 2024    CREATININE 1.49 (H) 2024        Patient weight is as follows:   Vitals:    24 0000   Weight: 104 kg (229 lb 8 oz)       Cultures:  No results found for the encounter in last 14 days.        I/O last 3 completed shifts:  In: 2050.6 (19.7 mL/kg) [I.V.:1950.6 (18.7 mL/kg); IV Piggyback:100]  Out: 2950 (28.3 mL/kg) [Urine:2950 (0.8 mL/kg/hr)]  Weight: 104.1 kg   I/O during current shift:  I/O this shift:  In: -   Out: 500 [Urine:500]    Temp (24hrs), Av.6 °C (99.6 °F), Min:36.5 °C (97.7 °F), Max:38.8 °C (101.8 °F)         Assessment/Plan     Patient will be given a loading dose of 2000 mg.  Will initiate vancomycin maintenance,  2000 mg every 24 hours. For predicted AUC = 548    This dosing regimen is predicted by [employer] to result in the following pharmacokinetic parameters:  Loading dose: 2000 mg at 01:00 2024.  Regimen: 2000 mg IV every 24 hours.  Start time: 01:00 on 2024  Exposure target: AUC24 (range)500-600 mg/L.hr   AUC24,ss: 548 mg/L.hr  Probability of AUC24 > 400: 82 %  Ctrough,ss: 15.8 mg/L  Probability of Ctrough,ss > 20: 30 %  Probability of nephrotoxicity (Lodise ANSON ): 11 %      Follow-up level will be ordered on  at am lab draw unless clinically indicated sooner.  Will continue to monitor renal function daily while on vancomycin and order serum creatinine at least every 48 hours if not already ordered.  Follow for continued vancomycin needs, clinical  response, and signs/symptoms of toxicity.       Terrell Bethea, PharmD

## 2024-06-29 LAB
ALBUMIN SERPL BCP-MCNC: 4.1 G/DL (ref 3.4–5)
ANION GAP SERPL CALC-SCNC: 10 MMOL/L (ref 10–20)
ATRIAL RATE: 92 BPM
BASOPHILS # BLD AUTO: 0.02 X10*3/UL (ref 0–0.1)
BASOPHILS NFR BLD AUTO: 0.3 %
BUN SERPL-MCNC: 26 MG/DL (ref 6–23)
CA-I BLD-SCNC: 1.17 MMOL/L (ref 1.1–1.33)
CALCIUM SERPL-MCNC: 9.6 MG/DL (ref 8.6–10.6)
CHLORIDE SERPL-SCNC: 99 MMOL/L (ref 98–107)
CO2 SERPL-SCNC: 29 MMOL/L (ref 21–32)
CREAT SERPL-MCNC: 1.28 MG/DL (ref 0.5–1.3)
EGFRCR SERPLBLD CKD-EPI 2021: 59 ML/MIN/1.73M*2
EOSINOPHIL # BLD AUTO: 0.1 X10*3/UL (ref 0–0.4)
EOSINOPHIL NFR BLD AUTO: 1.3 %
ERYTHROCYTE [DISTWIDTH] IN BLOOD BY AUTOMATED COUNT: 13.3 % (ref 11.5–14.5)
GLUCOSE BLD MANUAL STRIP-MCNC: 152 MG/DL (ref 74–99)
GLUCOSE BLD MANUAL STRIP-MCNC: 156 MG/DL (ref 74–99)
GLUCOSE BLD MANUAL STRIP-MCNC: 160 MG/DL (ref 74–99)
GLUCOSE BLD MANUAL STRIP-MCNC: 172 MG/DL (ref 74–99)
GLUCOSE BLD MANUAL STRIP-MCNC: 180 MG/DL (ref 74–99)
GLUCOSE BLD MANUAL STRIP-MCNC: 192 MG/DL (ref 74–99)
GLUCOSE SERPL-MCNC: 226 MG/DL (ref 74–99)
HCT VFR BLD AUTO: 41.8 % (ref 41–52)
HGB BLD-MCNC: 13.9 G/DL (ref 13.5–17.5)
IMM GRANULOCYTES # BLD AUTO: 0.03 X10*3/UL (ref 0–0.5)
IMM GRANULOCYTES NFR BLD AUTO: 0.4 % (ref 0–0.9)
LYMPHOCYTES # BLD AUTO: 1.31 X10*3/UL (ref 0.8–3)
LYMPHOCYTES NFR BLD AUTO: 16.6 %
MAGNESIUM SERPL-MCNC: 2.13 MG/DL (ref 1.6–2.4)
MCH RBC QN AUTO: 30 PG (ref 26–34)
MCHC RBC AUTO-ENTMCNC: 33.3 G/DL (ref 32–36)
MCV RBC AUTO: 90 FL (ref 80–100)
MONOCYTES # BLD AUTO: 1.11 X10*3/UL (ref 0.05–0.8)
MONOCYTES NFR BLD AUTO: 14.1 %
NEUTROPHILS # BLD AUTO: 5.3 X10*3/UL (ref 1.6–5.5)
NEUTROPHILS NFR BLD AUTO: 67.3 %
NRBC BLD-RTO: 0 /100 WBCS (ref 0–0)
P AXIS: 31 DEGREES
P OFFSET: 190 MS
P ONSET: 128 MS
PHOSPHATE SERPL-MCNC: 3.6 MG/DL (ref 2.5–4.9)
PLATELET # BLD AUTO: 154 X10*3/UL (ref 150–450)
POTASSIUM SERPL-SCNC: 4.1 MMOL/L (ref 3.5–5.3)
PR INTERVAL: 176 MS
PROCALCITONIN SERPL-MCNC: 0.05 NG/ML
Q ONSET: 216 MS
QRS COUNT: 15 BEATS
QRS DURATION: 78 MS
QT INTERVAL: 376 MS
QTC CALCULATION(BAZETT): 464 MS
QTC FREDERICIA: 433 MS
R AXIS: 18 DEGREES
RBC # BLD AUTO: 4.64 X10*6/UL (ref 4.5–5.9)
SODIUM SERPL-SCNC: 134 MMOL/L (ref 136–145)
STAPHYLOCOCCUS SPEC CULT: NORMAL
T AXIS: 118 DEGREES
T OFFSET: 404 MS
VANCOMYCIN SERPL-MCNC: 12.5 UG/ML (ref 5–20)
VENTRICULAR RATE: 92 BPM
WBC # BLD AUTO: 7.9 X10*3/UL (ref 4.4–11.3)

## 2024-06-29 PROCEDURE — 80202 ASSAY OF VANCOMYCIN: CPT

## 2024-06-29 PROCEDURE — 85025 COMPLETE CBC W/AUTO DIFF WBC: CPT

## 2024-06-29 PROCEDURE — 2500000004 HC RX 250 GENERAL PHARMACY W/ HCPCS (ALT 636 FOR OP/ED)

## 2024-06-29 PROCEDURE — 2500000001 HC RX 250 WO HCPCS SELF ADMINISTERED DRUGS (ALT 637 FOR MEDICARE OP)

## 2024-06-29 PROCEDURE — 1100000001 HC PRIVATE ROOM DAILY

## 2024-06-29 PROCEDURE — 83735 ASSAY OF MAGNESIUM: CPT

## 2024-06-29 PROCEDURE — 2500000001 HC RX 250 WO HCPCS SELF ADMINISTERED DRUGS (ALT 637 FOR MEDICARE OP): Performed by: STUDENT IN AN ORGANIZED HEALTH CARE EDUCATION/TRAINING PROGRAM

## 2024-06-29 PROCEDURE — 80069 RENAL FUNCTION PANEL: CPT

## 2024-06-29 PROCEDURE — 36415 COLL VENOUS BLD VENIPUNCTURE: CPT

## 2024-06-29 PROCEDURE — 99233 SBSQ HOSP IP/OBS HIGH 50: CPT | Performed by: STUDENT IN AN ORGANIZED HEALTH CARE EDUCATION/TRAINING PROGRAM

## 2024-06-29 PROCEDURE — 2500000005 HC RX 250 GENERAL PHARMACY W/O HCPCS

## 2024-06-29 PROCEDURE — 84145 PROCALCITONIN (PCT): CPT

## 2024-06-29 PROCEDURE — 82330 ASSAY OF CALCIUM: CPT

## 2024-06-29 PROCEDURE — 2500000002 HC RX 250 W HCPCS SELF ADMINISTERED DRUGS (ALT 637 FOR MEDICARE OP, ALT 636 FOR OP/ED): Performed by: STUDENT IN AN ORGANIZED HEALTH CARE EDUCATION/TRAINING PROGRAM

## 2024-06-29 PROCEDURE — 82947 ASSAY GLUCOSE BLOOD QUANT: CPT | Mod: 91

## 2024-06-29 PROCEDURE — 2500000002 HC RX 250 W HCPCS SELF ADMINISTERED DRUGS (ALT 637 FOR MEDICARE OP, ALT 636 FOR OP/ED)

## 2024-06-29 ASSESSMENT — COGNITIVE AND FUNCTIONAL STATUS - GENERAL
STANDING UP FROM CHAIR USING ARMS: A LOT
MOVING FROM LYING ON BACK TO SITTING ON SIDE OF FLAT BED WITH BEDRAILS: A LOT
TURNING FROM BACK TO SIDE WHILE IN FLAT BAD: A LOT
TURNING FROM BACK TO SIDE WHILE IN FLAT BAD: A LOT
CLIMB 3 TO 5 STEPS WITH RAILING: TOTAL
MOBILITY SCORE: 10
MOVING TO AND FROM BED TO CHAIR: A LOT
MOBILITY SCORE: 10
MOVING FROM LYING ON BACK TO SITTING ON SIDE OF FLAT BED WITH BEDRAILS: A LOT
WALKING IN HOSPITAL ROOM: TOTAL
WALKING IN HOSPITAL ROOM: TOTAL
STANDING UP FROM CHAIR USING ARMS: A LOT
CLIMB 3 TO 5 STEPS WITH RAILING: TOTAL
MOVING TO AND FROM BED TO CHAIR: A LOT

## 2024-06-29 ASSESSMENT — PAIN - FUNCTIONAL ASSESSMENT
PAIN_FUNCTIONAL_ASSESSMENT: 0-10

## 2024-06-29 ASSESSMENT — PAIN SCALES - GENERAL
PAINLEVEL_OUTOF10: 0 - NO PAIN

## 2024-06-29 NOTE — PROGRESS NOTES
S:    Fevered evening of 6/28 placed on antibiotics. 2nd set of blood cultures NGTD.    Patient states he feels some tingling in his L arm and leg when it is moved around. States he feels sometimes warm. Denies chills, polyuria, or cough.       O:        6/28/2024     5:00 PM 6/28/2024     7:24 PM 6/28/2024    11:57 PM 6/29/2024     1:03 AM 6/29/2024     6:12 AM 6/29/2024     7:25 AM 6/29/2024    11:27 AM   Vitals   Systolic 150 143  144 132 138 149   Diastolic 87 76  77 72 74 79   Heart Rate  79  79 70 72 71   Temp  37.4 °C (99.3 °F) 37.7 °C (99.9 °F) 36.9 °C (98.4 °F) 36.4 °C (97.5 °F) 37 °C (98.6 °F) 36.6 °C (97.9 °F)   Resp  18  18 18 17 17     CV : RRR, Chest: CTAB, GI : soft, non tender, non distended  Mental status: Oriented x4, converses normally with intact language domains. Following simple and complex commands.  CN: Pupils equal and reactive, VF intact, EOM intact, L upper and lower face droop, no dysarthria.  Motor: Rt side 5/5, LUE flicker finger movements, LLE AG with no drift, minimal knee flexion, dorsi/plantar flexion 5/5.  Sensory: intact in all extremities with no eglect  Coordination intact FTN In LUE  Gait Deferred.     NIH Stroke Scale  1A. Level of Consciousness: Alert, Keenly Responsive  1B. Ask Month and Age: Both Questions Right  1C. Blink Eyes & Squeeze Hands: Performs Both Tasks  2. Best Gaze: Normal  3. Visual: No Visual Loss  4. Facial Palsy: Partial Paralysis  5A. Motor - Left Arm: No Movement  5B. Motor - Right Arm: No Drift  6A. Motor - Left Leg: Drift  6B. Motor - Right Leg: No Drift  7. Limb Ataxia: Absent  8. Sensory Loss: Normal  9. Best Language: No Aphasia  10. Dysarthria: Normal  11. Extinction and Inattention: No Abnormality  NIH Stroke Scale: 7     Jordon Waddell is a 73 y.o.  right handed male with a PMHx of HTN, HLD, DM, CKD2, who presents with R thalamic ICH, slightly enlarged on first repeat CHT, stable on third repeat CTH. Initial /86.  Likely hypertensive in  etiology. Admitted for further management. TTE EF 50-55%, no PFO, LA not well sen. MR for discharge once blood cultures -ve for 48 hours.      #R thalamic ICH  #HTN, HLD  :: Hold home BP meds: HCTZ 12.5mg BID  - Tele  - SBP goal 130-150  - Hold home ASA  - increase lisinopril ot 20mg BID, Amlodipine 5mg.   - c/w Rosuvastatin 40mg  - SLP failed x2 -> MBSS -> passed for soft-bite sized and mild thick liquids.        #Elevated temp  :: Max 38.1, downtrended  :: Asmyptomatic  - Hold tylenol if possible  - CXR/UA unremarkable  [ ] follow WBC, procal, BCx   - plan to DC jennifer/vanc at 48 hours if no culture data     #DM2  - hold home meds  - ISS and hypoglycemia protocol     #CKD2 - stable Cr     #Dispo: IAR per PT/OT.     DVT ppx: SCDs, SQH Start 6/28.  Code: Full code    Brenda Dowell MD  PGY-3 Neurology  Stroke [id number]

## 2024-06-29 NOTE — PROGRESS NOTES
Vancomycin Dosing by Pharmacy- FOLLOW UP    Jordon Waddell is a 73 y.o. year old male who Pharmacy has been consulted for vancomycin dosing for pneumonia. Based on the patient's indication and renal status this patient is being dosed based on a goal AUC of 500-600.     Renal function is currently stable.    Current vancomycin dose: 2000 mg given every 24 hours    Estimated vancomycin AUC on current dose: 618 mg/L.hr     Visit Vitals  /79 (BP Location: Right arm)   Pulse 71   Temp 36.6 °C (97.9 °F)   Resp 17        Lab Results   Component Value Date    CREATININE 1.28 2024    CREATININE 1.11 2024    CREATININE 1.21 2024    CREATININE 1.26 2024        Patient weight is as follows:   Vitals:    24 0000   Weight: 104 kg (229 lb 8 oz)       Cultures:  No results found for the encounter in last 14 days.       I/O last 3 completed shifts:  In: 300 (2.9 mL/kg) [I.V.:200 (1.9 mL/kg); IV Piggyback:100]  Out: 1875 (18 mL/kg) [Urine:1875 (0.5 mL/kg/hr)]  Weight: 104.1 kg   I/O during current shift:  No intake/output data recorded.    Temp (24hrs), Av.3 °C (99.1 °F), Min:36.4 °C (97.5 °F), Max:38.8 °C (101.8 °F)      Assessment/Plan    Above goal AUC. Orders placed for new vancomcyin regimen of 1750 every 24 hours to begin at 2100.    This dosing regimen is predicted by [employer] to result in the following pharmacokinetic parameters:  Loading dose: N/A  Regimen: 1750 mg IV every 24 hours.  Start time: 09:47 on 2024  Exposure target: AUC24 (range)500-600 mg/L.hr   AUC24,ss: 544 mg/L.hr  Probability of AUC24 > 400: 93 %  Ctrough,ss: 16.3 mg/L  Probability of Ctrough,ss > 20: 27 %  Probability of nephrotoxicity (Lodise ANSON ): 12 %    The next level will be obtained on  at AM labs. May be obtained sooner if clinically indicated.   Will continue to monitor renal function daily while on vancomycin and order serum creatinine at least every 48 hours if not already ordered.  Follow for  continued vancomycin needs, clinical response, and signs/symptoms of toxicity.       Fortunato Dooley, PharmD

## 2024-06-30 ENCOUNTER — APPOINTMENT (OUTPATIENT)
Dept: RADIOLOGY | Facility: HOSPITAL | Age: 74
DRG: 064 | End: 2024-06-30
Payer: MEDICARE

## 2024-06-30 VITALS
HEART RATE: 64 BPM | TEMPERATURE: 97.7 F | HEIGHT: 67 IN | OXYGEN SATURATION: 97 % | WEIGHT: 229.5 LBS | BODY MASS INDEX: 36.02 KG/M2 | DIASTOLIC BLOOD PRESSURE: 71 MMHG | SYSTOLIC BLOOD PRESSURE: 128 MMHG | RESPIRATION RATE: 18 BRPM

## 2024-06-30 PROBLEM — Z00.00 ROUTINE GENERAL MEDICAL EXAMINATION AT HEALTH CARE FACILITY: Status: ACTIVE | Noted: 2024-06-30

## 2024-06-30 PROBLEM — Z12.5 PROSTATE CANCER SCREENING: Status: ACTIVE | Noted: 2024-06-30

## 2024-06-30 PROBLEM — Z23 NEED FOR SHINGLES VACCINE: Status: ACTIVE | Noted: 2024-06-30

## 2024-06-30 PROBLEM — I10 HYPERTENSION: Status: ACTIVE | Noted: 2024-06-30

## 2024-06-30 PROBLEM — E11.21 DIABETES MELLITUS WITH KIDNEY DISEASE (MULTI): Status: ACTIVE | Noted: 2024-06-30

## 2024-06-30 LAB
ALBUMIN SERPL BCP-MCNC: 4.1 G/DL (ref 3.4–5)
ANION GAP SERPL CALC-SCNC: 14 MMOL/L (ref 10–20)
BACTERIA BLD CULT: NORMAL
BASOPHILS # BLD AUTO: 0.03 X10*3/UL (ref 0–0.1)
BASOPHILS NFR BLD AUTO: 0.4 %
BUN SERPL-MCNC: 23 MG/DL (ref 6–23)
CALCIUM SERPL-MCNC: 9.5 MG/DL (ref 8.6–10.6)
CHLORIDE SERPL-SCNC: 102 MMOL/L (ref 98–107)
CO2 SERPL-SCNC: 25 MMOL/L (ref 21–32)
CREAT SERPL-MCNC: 1.04 MG/DL (ref 0.5–1.3)
EGFRCR SERPLBLD CKD-EPI 2021: 76 ML/MIN/1.73M*2
EOSINOPHIL # BLD AUTO: 0.22 X10*3/UL (ref 0–0.4)
EOSINOPHIL NFR BLD AUTO: 2.7 %
ERYTHROCYTE [DISTWIDTH] IN BLOOD BY AUTOMATED COUNT: 13.1 % (ref 11.5–14.5)
GLUCOSE BLD MANUAL STRIP-MCNC: 165 MG/DL (ref 74–99)
GLUCOSE BLD MANUAL STRIP-MCNC: 179 MG/DL (ref 74–99)
GLUCOSE BLD MANUAL STRIP-MCNC: 180 MG/DL (ref 74–99)
GLUCOSE BLD MANUAL STRIP-MCNC: 190 MG/DL (ref 74–99)
GLUCOSE BLD MANUAL STRIP-MCNC: 194 MG/DL (ref 74–99)
GLUCOSE BLD MANUAL STRIP-MCNC: 200 MG/DL (ref 74–99)
GLUCOSE BLD MANUAL STRIP-MCNC: 227 MG/DL (ref 74–99)
GLUCOSE SERPL-MCNC: 157 MG/DL (ref 74–99)
HCT VFR BLD AUTO: 43.1 % (ref 41–52)
HGB BLD-MCNC: 14.4 G/DL (ref 13.5–17.5)
IMM GRANULOCYTES # BLD AUTO: 0.03 X10*3/UL (ref 0–0.5)
IMM GRANULOCYTES NFR BLD AUTO: 0.4 % (ref 0–0.9)
LYMPHOCYTES # BLD AUTO: 1.48 X10*3/UL (ref 0.8–3)
LYMPHOCYTES NFR BLD AUTO: 18 %
MAGNESIUM SERPL-MCNC: 2.33 MG/DL (ref 1.6–2.4)
MCH RBC QN AUTO: 30.1 PG (ref 26–34)
MCHC RBC AUTO-ENTMCNC: 33.4 G/DL (ref 32–36)
MCV RBC AUTO: 90 FL (ref 80–100)
MONOCYTES # BLD AUTO: 1.1 X10*3/UL (ref 0.05–0.8)
MONOCYTES NFR BLD AUTO: 13.4 %
NEUTROPHILS # BLD AUTO: 5.35 X10*3/UL (ref 1.6–5.5)
NEUTROPHILS NFR BLD AUTO: 65.1 %
NRBC BLD-RTO: 0 /100 WBCS (ref 0–0)
PHOSPHATE SERPL-MCNC: 3.8 MG/DL (ref 2.5–4.9)
PLATELET # BLD AUTO: 145 X10*3/UL (ref 150–450)
POTASSIUM SERPL-SCNC: 4.3 MMOL/L (ref 3.5–5.3)
RBC # BLD AUTO: 4.79 X10*6/UL (ref 4.5–5.9)
SODIUM SERPL-SCNC: 137 MMOL/L (ref 136–145)
VANCOMYCIN SERPL-MCNC: 19.2 UG/ML (ref 5–20)
WBC # BLD AUTO: 8.2 X10*3/UL (ref 4.4–11.3)

## 2024-06-30 PROCEDURE — 2500000002 HC RX 250 W HCPCS SELF ADMINISTERED DRUGS (ALT 637 FOR MEDICARE OP, ALT 636 FOR OP/ED): Performed by: STUDENT IN AN ORGANIZED HEALTH CARE EDUCATION/TRAINING PROGRAM

## 2024-06-30 PROCEDURE — 73610 X-RAY EXAM OF ANKLE: CPT | Mod: LEFT SIDE | Performed by: RADIOLOGY

## 2024-06-30 PROCEDURE — 83735 ASSAY OF MAGNESIUM: CPT | Performed by: STUDENT IN AN ORGANIZED HEALTH CARE EDUCATION/TRAINING PROGRAM

## 2024-06-30 PROCEDURE — 99233 SBSQ HOSP IP/OBS HIGH 50: CPT

## 2024-06-30 PROCEDURE — 2500000005 HC RX 250 GENERAL PHARMACY W/O HCPCS

## 2024-06-30 PROCEDURE — 2500000004 HC RX 250 GENERAL PHARMACY W/ HCPCS (ALT 636 FOR OP/ED)

## 2024-06-30 PROCEDURE — 80069 RENAL FUNCTION PANEL: CPT | Performed by: STUDENT IN AN ORGANIZED HEALTH CARE EDUCATION/TRAINING PROGRAM

## 2024-06-30 PROCEDURE — 2500000001 HC RX 250 WO HCPCS SELF ADMINISTERED DRUGS (ALT 637 FOR MEDICARE OP)

## 2024-06-30 PROCEDURE — 80202 ASSAY OF VANCOMYCIN: CPT

## 2024-06-30 PROCEDURE — 36415 COLL VENOUS BLD VENIPUNCTURE: CPT

## 2024-06-30 PROCEDURE — 2500000002 HC RX 250 W HCPCS SELF ADMINISTERED DRUGS (ALT 637 FOR MEDICARE OP, ALT 636 FOR OP/ED)

## 2024-06-30 PROCEDURE — 82947 ASSAY GLUCOSE BLOOD QUANT: CPT | Mod: 91

## 2024-06-30 PROCEDURE — 85025 COMPLETE CBC W/AUTO DIFF WBC: CPT

## 2024-06-30 PROCEDURE — 71045 X-RAY EXAM CHEST 1 VIEW: CPT

## 2024-06-30 PROCEDURE — 1100000001 HC PRIVATE ROOM DAILY

## 2024-06-30 PROCEDURE — 73610 X-RAY EXAM OF ANKLE: CPT | Mod: LT

## 2024-06-30 PROCEDURE — 2500000001 HC RX 250 WO HCPCS SELF ADMINISTERED DRUGS (ALT 637 FOR MEDICARE OP): Performed by: STUDENT IN AN ORGANIZED HEALTH CARE EDUCATION/TRAINING PROGRAM

## 2024-06-30 PROCEDURE — 71045 X-RAY EXAM CHEST 1 VIEW: CPT | Performed by: RADIOLOGY

## 2024-06-30 RX ORDER — LIDOCAINE 560 MG/1
1 PATCH PERCUTANEOUS; TOPICAL; TRANSDERMAL DAILY PRN
Status: DISCONTINUED | OUTPATIENT
Start: 2024-06-30 | End: 2024-06-30

## 2024-06-30 RX ORDER — LIDOCAINE 560 MG/1
1 PATCH PERCUTANEOUS; TOPICAL; TRANSDERMAL DAILY
Status: DISPENSED | OUTPATIENT
Start: 2024-06-30

## 2024-06-30 ASSESSMENT — ENCOUNTER SYMPTOMS
HEMATOLOGIC/LYMPHATIC NEGATIVE: 1
NEUROLOGICAL NEGATIVE: 1
GASTROINTESTINAL NEGATIVE: 1
ALLERGIC/IMMUNOLOGIC NEGATIVE: 1
VOMITING: 0
MUSCULOSKELETAL NEGATIVE: 1
RESPIRATORY NEGATIVE: 1
NAUSEA: 0
FEVER: 0
CHILLS: 0
ENDOCRINE NEGATIVE: 1
PSYCHIATRIC NEGATIVE: 1
CARDIOVASCULAR NEGATIVE: 1

## 2024-06-30 ASSESSMENT — COGNITIVE AND FUNCTIONAL STATUS - GENERAL
WALKING IN HOSPITAL ROOM: TOTAL
STANDING UP FROM CHAIR USING ARMS: TOTAL
CLIMB 3 TO 5 STEPS WITH RAILING: TOTAL
HELP NEEDED FOR BATHING: TOTAL
EATING MEALS: A LITTLE
MOBILITY SCORE: 9
WALKING IN HOSPITAL ROOM: TOTAL
STANDING UP FROM CHAIR USING ARMS: TOTAL
MOVING TO AND FROM BED TO CHAIR: A LOT
EATING MEALS: A LITTLE
TOILETING: TOTAL
MOBILITY SCORE: 9
DAILY ACTIVITIY SCORE: 9
DRESSING REGULAR LOWER BODY CLOTHING: TOTAL
DRESSING REGULAR UPPER BODY CLOTHING: TOTAL
TURNING FROM BACK TO SIDE WHILE IN FLAT BAD: A LOT
HELP NEEDED FOR BATHING: TOTAL
MOVING FROM LYING ON BACK TO SITTING ON SIDE OF FLAT BED WITH BEDRAILS: A LOT
MOVING TO AND FROM BED TO CHAIR: A LOT
DRESSING REGULAR UPPER BODY CLOTHING: TOTAL
PERSONAL GROOMING: A LOT
DAILY ACTIVITIY SCORE: 8
TURNING FROM BACK TO SIDE WHILE IN FLAT BAD: A LOT
MOVING FROM LYING ON BACK TO SITTING ON SIDE OF FLAT BED WITH BEDRAILS: A LOT
TOILETING: TOTAL
CLIMB 3 TO 5 STEPS WITH RAILING: TOTAL
DRESSING REGULAR LOWER BODY CLOTHING: TOTAL
PERSONAL GROOMING: TOTAL

## 2024-06-30 ASSESSMENT — PAIN SCALES - GENERAL
PAINLEVEL_OUTOF10: 0 - NO PAIN

## 2024-06-30 ASSESSMENT — PAIN - FUNCTIONAL ASSESSMENT
PAIN_FUNCTIONAL_ASSESSMENT: 0-10

## 2024-06-30 NOTE — PROGRESS NOTES
Vancomycin Dosing by Pharmacy- FOLLOW UP    Jordon Waddell is a 73 y.o. year old male who Pharmacy has been consulted for vancomycin dosing for pneumonia. Based on the patient's indication and renal status this patient is being dosed based on a goal AUC of 500-600.     Renal function is currently stable.    Current vancomycin dose: 1750 mg given every 24 hours    Estimated vancomycin AUC on current dose: 507 mg/L.hr     Visit Vitals  /75 (BP Location: Right arm, Patient Position: Lying)   Pulse 71   Temp 36.4 °C (97.5 °F) (Temporal)   Resp 16        Lab Results   Component Value Date    CREATININE 1.04 2024    CREATININE 1.28 2024    CREATININE 1.11 2024    CREATININE 1.21 2024        Patient weight is as follows:   Vitals:    24 0000   Weight: 104 kg (229 lb 8 oz)       Cultures:  No results found for the encounter in last 14 days.       I/O last 3 completed shifts:  In: 580 (5.6 mL/kg) [P.O.:480; IV Piggyback:100]  Out: 1325 (12.7 mL/kg) [Urine:1325 (0.4 mL/kg/hr)]  Weight: 104.1 kg   I/O during current shift:  I/O this shift:  In: 480 [P.O.:480]  Out: -     Temp (24hrs), Av.6 °C (97.9 °F), Min:36 °C (96.8 °F), Max:37.1 °C (98.8 °F)      Assessment/Plan    Within goal AUC range. Continue current vancomycin regimen.    This dosing regimen is predicted by [employer] to result in the following pharmacokinetic parameters:  Loading dose: N/A  Regimen: 1750 mg IV every 24 hours.  Start time: 21:07 on 2024  Exposure target: AUC24 (range)500-600 mg/L.hr   AUC24,ss: 507 mg/L.hr  Probability of AUC24 > 400: 90 %  Ctrough,ss: 14.7 mg/L  Probability of Ctrough,ss > 20: 16 %  Probability of nephrotoxicity (Lodise ANSON ): 10 %    The next level will be obtained on  at AM labs. May be obtained sooner if clinically indicated.   Will continue to monitor renal function daily while on vancomycin and order serum creatinine at least every 48 hours if not already ordered.  Follow for  continued vancomycin needs, clinical response, and signs/symptoms of toxicity.       Fortunato Dooley, PharmD

## 2024-06-30 NOTE — ASSESSMENT & PLAN NOTE
Patient's blood pressure is at goal of 130/85 or less. Condition is stable. Continue current medications and treatment plan.  I recommend that you exercise for 30-45 minutes 5 days a week.  I also recommend a balanced diet with fruits and vegetables every day, lean meats, and little fried foods. The DASH diet (you can find this online) is a good example of this.  BP at goal  C/w Lisinopril/hydrochlorothiazide

## 2024-06-30 NOTE — PROGRESS NOTES
Jordon Waddell is a 73 y.o. male on day 4 of admission presenting with Intracranial hemorrhage (Multi).    Subjective   No acute events overnight, complains of new pain in the left heel yesterday. Still feels week in the left side, did not notice any significant differences.       Objective   Heart Rate:  [65-78]   Temp:  [36 °C (96.8 °F)-37.1 °C (98.8 °F)]   Resp:  [16-18]   BP: (128-149)/(70-79)   SpO2:  [94 %-98 %]       Physical Exam  Neurological Exam  CV : RRR, Chest: CTAB, GI : soft, non tender, non distended  Mental status: Oriented x4, converses normally with intact language domains. Following simple and complex commands.  CN: Pupils equal and reactive, VF intact, EOM intact, L upper < lower face droop, no dysarthria.  Motor: Rt side 5/5, LUE flicker finger movements, LLE AG with mild drift, some knee flexion, dorsi/plantar flexion 5/5.  Sensory: intact in all extremities with no neglect, however on fine sensory testing there is subtle sensory deficits in the LUE/LLE  Coordination intact FTN In RUE  Gait Deferred.    Legs: not swollen with with no tenderness in the calf, mild tenderness behind the heel.    Relevant Results    NIH Stroke Scale  1A. Level of Consciousness: Alert, Keenly Responsive  1B. Ask Month and Age: Both Questions Right  1C. Blink Eyes & Squeeze Hands: Performs Both Tasks  2. Best Gaze: Normal  3. Visual: No Visual Loss  4. Facial Palsy: Partial Paralysis  5A. Motor - Left Arm: No Movement  5B. Motor - Right Arm: No Drift  6A. Motor - Left Leg: Drift  6B. Motor - Right Leg: No Drift  7. Limb Ataxia: Absent  8. Sensory Loss: Normal  9. Best Language: No Aphasia  10. Dysarthria: Normal  11. Extinction and Inattention: No Abnormality  NIH Stroke Scale: 7       Assessment/Plan      Principal Problem:    Intracranial hemorrhage (Multi)  Jordon Waddell is a 73 y.o.  right handed male with a PMHx of HTN, HLD, DM, CKD2, who presents with R thalamic ICH, slightly enlarged on first repeat CHT,  stable on third repeat CTH. Initial /86.  Likely hypertensive in etiology. Admitted for further management. TTE EF 50-55%, no PFO, LA not well sen. Passed MBSS for modified diet.     #R thalamic ICH  #HTN, HLD  :: Hold home BP meds: HCTZ 12.5mg BID  - Tele  - SBP goal 130-150  - Hold home ASA  - c/w lisinopril ot 20mg BID, Amlodipine 5mg.   - c/w Rosuvastatin 40mg  - SLP failed x2 -> MBSS -> passed for soft-bite sized and mild thick liquids.      #Elevated temp  :: Max 38.1, downtrended  :: Asmyptomatic  - Hold tylenol if possible  - CXR/UA unremarkable, repeat CXR 6/30 stable.  - No leukocytosis  - complete jennifer/vanc for 48 h then stop(later today)/      #Heel pain: lidocaine patch       #DM2  - Hold home meds  - ISS and hypoglycemia protocol    #CKD2 - stable Cr    #Dispo: IAR per PT/OT.    DVT ppx: SCDs, SQH Start 6/28.  Code: Full code      Praful Hodge MD

## 2024-07-01 PROBLEM — M79.606 LEG PAIN: Status: ACTIVE | Noted: 2024-07-01

## 2024-07-01 LAB
BASOPHILS # BLD AUTO: 0.02 X10*3/UL (ref 0–0.1)
BASOPHILS NFR BLD AUTO: 0.3 %
CALCIUM SERPL-MCNC: 9.7 MG/DL (ref 8.6–10.6)
EOSINOPHIL # BLD AUTO: 0.22 X10*3/UL (ref 0–0.4)
EOSINOPHIL NFR BLD AUTO: 3 %
ERYTHROCYTE [DISTWIDTH] IN BLOOD BY AUTOMATED COUNT: 13.2 % (ref 11.5–14.5)
GLUCOSE BLD MANUAL STRIP-MCNC: 173 MG/DL (ref 74–99)
GLUCOSE BLD MANUAL STRIP-MCNC: 192 MG/DL (ref 74–99)
GLUCOSE BLD MANUAL STRIP-MCNC: 199 MG/DL (ref 74–99)
GLUCOSE BLD MANUAL STRIP-MCNC: 219 MG/DL (ref 74–99)
GLUCOSE BLD MANUAL STRIP-MCNC: 232 MG/DL (ref 74–99)
HCT VFR BLD AUTO: 43.6 % (ref 41–52)
HGB BLD-MCNC: 14 G/DL (ref 13.5–17.5)
IMM GRANULOCYTES # BLD AUTO: 0.08 X10*3/UL (ref 0–0.5)
IMM GRANULOCYTES NFR BLD AUTO: 1.1 % (ref 0–0.9)
LYMPHOCYTES # BLD AUTO: 1.15 X10*3/UL (ref 0.8–3)
LYMPHOCYTES NFR BLD AUTO: 15.6 %
MCH RBC QN AUTO: 29.5 PG (ref 26–34)
MCHC RBC AUTO-ENTMCNC: 32.1 G/DL (ref 32–36)
MCV RBC AUTO: 92 FL (ref 80–100)
MONOCYTES # BLD AUTO: 0.94 X10*3/UL (ref 0.05–0.8)
MONOCYTES NFR BLD AUTO: 12.8 %
NEUTROPHILS # BLD AUTO: 4.95 X10*3/UL (ref 1.6–5.5)
NEUTROPHILS NFR BLD AUTO: 67.2 %
NRBC BLD-RTO: 0 /100 WBCS (ref 0–0)
PLATELET # BLD AUTO: 145 X10*3/UL (ref 150–450)
RBC # BLD AUTO: 4.74 X10*6/UL (ref 4.5–5.9)
WBC # BLD AUTO: 7.4 X10*3/UL (ref 4.4–11.3)

## 2024-07-01 PROCEDURE — 82310 ASSAY OF CALCIUM: CPT | Performed by: STUDENT IN AN ORGANIZED HEALTH CARE EDUCATION/TRAINING PROGRAM

## 2024-07-01 PROCEDURE — 2500000005 HC RX 250 GENERAL PHARMACY W/O HCPCS

## 2024-07-01 PROCEDURE — 2500000004 HC RX 250 GENERAL PHARMACY W/ HCPCS (ALT 636 FOR OP/ED)

## 2024-07-01 PROCEDURE — 82947 ASSAY GLUCOSE BLOOD QUANT: CPT

## 2024-07-01 PROCEDURE — 2500000002 HC RX 250 W HCPCS SELF ADMINISTERED DRUGS (ALT 637 FOR MEDICARE OP, ALT 636 FOR OP/ED)

## 2024-07-01 PROCEDURE — 85025 COMPLETE CBC W/AUTO DIFF WBC: CPT | Performed by: STUDENT IN AN ORGANIZED HEALTH CARE EDUCATION/TRAINING PROGRAM

## 2024-07-01 PROCEDURE — 2500000002 HC RX 250 W HCPCS SELF ADMINISTERED DRUGS (ALT 637 FOR MEDICARE OP, ALT 636 FOR OP/ED): Performed by: STUDENT IN AN ORGANIZED HEALTH CARE EDUCATION/TRAINING PROGRAM

## 2024-07-01 PROCEDURE — 97530 THERAPEUTIC ACTIVITIES: CPT | Mod: GP,CQ

## 2024-07-01 PROCEDURE — 36415 COLL VENOUS BLD VENIPUNCTURE: CPT | Performed by: STUDENT IN AN ORGANIZED HEALTH CARE EDUCATION/TRAINING PROGRAM

## 2024-07-01 PROCEDURE — 1100000001 HC PRIVATE ROOM DAILY

## 2024-07-01 PROCEDURE — 99233 SBSQ HOSP IP/OBS HIGH 50: CPT

## 2024-07-01 PROCEDURE — 2500000001 HC RX 250 WO HCPCS SELF ADMINISTERED DRUGS (ALT 637 FOR MEDICARE OP)

## 2024-07-01 PROCEDURE — 2500000001 HC RX 250 WO HCPCS SELF ADMINISTERED DRUGS (ALT 637 FOR MEDICARE OP): Performed by: STUDENT IN AN ORGANIZED HEALTH CARE EDUCATION/TRAINING PROGRAM

## 2024-07-01 ASSESSMENT — COGNITIVE AND FUNCTIONAL STATUS - GENERAL
EATING MEALS: A LITTLE
STANDING UP FROM CHAIR USING ARMS: A LOT
CLIMB 3 TO 5 STEPS WITH RAILING: A LOT
DRESSING REGULAR UPPER BODY CLOTHING: A LOT
MOBILITY SCORE: 12
DAILY ACTIVITIY SCORE: 15
TURNING FROM BACK TO SIDE WHILE IN FLAT BAD: A LOT
CLIMB 3 TO 5 STEPS WITH RAILING: TOTAL
MOVING TO AND FROM BED TO CHAIR: A LOT
TOILETING: A LITTLE
DRESSING REGULAR LOWER BODY CLOTHING: A LOT
PERSONAL GROOMING: A LITTLE
WALKING IN HOSPITAL ROOM: A LOT
TURNING FROM BACK TO SIDE WHILE IN FLAT BAD: A LOT
WALKING IN HOSPITAL ROOM: TOTAL
STANDING UP FROM CHAIR USING ARMS: TOTAL
MOVING FROM LYING ON BACK TO SITTING ON SIDE OF FLAT BED WITH BEDRAILS: A LOT
HELP NEEDED FOR BATHING: A LOT
MOVING TO AND FROM BED TO CHAIR: TOTAL
MOVING FROM LYING ON BACK TO SITTING ON SIDE OF FLAT BED WITH BEDRAILS: A LOT
MOBILITY SCORE: 8

## 2024-07-01 ASSESSMENT — PAIN SCALES - GENERAL
PAINLEVEL_OUTOF10: 0 - NO PAIN
PAINLEVEL_OUTOF10: 2
PAINLEVEL_OUTOF10: 0 - NO PAIN

## 2024-07-01 ASSESSMENT — PAIN - FUNCTIONAL ASSESSMENT
PAIN_FUNCTIONAL_ASSESSMENT: 0-10
PAIN_FUNCTIONAL_ASSESSMENT: 0-10

## 2024-07-01 NOTE — PROGRESS NOTES
Physical Therapy    Physical Therapy Treatment    Patient Name: Jordon Waddell  MRN: [id number]  Today's Date: 7/1/2024  Room: [address] [place] [address]  Time Calculation  Start Time: 1401  Stop Time: 1441  Time Calculation (min): 40 min       Assessment/Plan   PT Assessment  PT Assessment Results: Decreased strength, Decreased endurance, Impaired balance, Decreased mobility  Rehab Prognosis: Good  Evaluation/Treatment Tolerance: Patient tolerated treatment well  Medical Staff Made Aware: Yes  Strengths: Coping skills, Support of Caregivers  End of Session Communication: Bedside nurse  End of Session Patient Position: Bed, 3 rail up, Alarm off, caregiver present     PT Plan  Treatment/Interventions: Bed mobility, Transfer training, Gait training, Stair training, Balance training, Neuromuscular re-education, Strengthening, Endurance training, Therapeutic exercise, Therapeutic activity, Home exercise program, Positioning, Postural re-education  PT Plan: Ongoing PT  PT Frequency: 5 times per week  PT Discharge Recommendations: High intensity level of continued care  PT Recommended Transfer Status: Assist x2  PT - OK to Discharge: Yes (When medically ready)  Assessment: Patient is progressing Well with therapy this date. Pt very motivated to participate, maxAx2 for all mobility. Would continue to benefit from continued skilled PT to address all mobility deficits; remains appropriate for HIGH intensity therapy when medically appropriate for discharge from acute stay.  Will continue to follow.     General Visit Information:   PT  Visit  PT Received On: 07/01/24  Prior to Session Communication: Bedside nurse  Patient Position Received: Bed, 3 rail up  Family/Caregiver Present: Yes  Caregiver Feedback: Spouse at bedside, supportive     Subjective   Subjective: Pt pleasant and agreeable to therapy upon approach   Precautions:  Precautions  Medical Precautions: Fall precautions  Precautions Comment: -150  Vital Signs:  Vital Signs  BP:  116/66  BP Location: Right arm  BP Method: Automatic  Patient Position: Lying    Objective   Pain:     Cognition:  Cognition  Orientation Level: Oriented X4  Insight: Within function limits  Impulsive: Within functional limits     Static Sitting balance:  Static Sitting Balance  Static Sitting-Balance Support: Feet supported, Bilateral upper extremity supported  Static Sitting-Level of Assistance: Minimum assistance, Moderate assistance  Static Sitting-Comment/Number of Minutes: Varying levels of assist, with consistent vc for midline posture, significant lateral lean L. 15m total  Static Standing Balance:  Static Standing Balance  Static Standing-Balance Support: Bilateral upper extremity supported  Static Standing-Level of Assistance: Maximum assistance (x2)  Static Standing-Comment/Number of Minutes: up to 35s x2 trials. mod vc to adjust to midline posture        Lines/Tubes/Drains:  External Urinary Catheter (Active)   Number of days: 4     Continuous Medications/Drips:       PT Treatments:  Therapeutic Exercise  Therapeutic Exercise Performed: Yes  Therapeutic Exercise Activity 1: PROM of LLE in all planes 5min to decrease contracture risk        Bed Mobility 1  Bed Mobility 1: Supine to sitting, Sitting to supine  Level of Assistance 1: Maximum assistance, +2, Moderate verbal cues  Bed Mobility Comments 1: HOB elevated, verbal/tactile cues for sequencing and getting to upright posture  Bed Mobility 2  Bed Mobility  2: Scooting  Level of Assistance 2: Dependent, +2  Bed Mobility Comments 2: scooting towards HOB     Transfer 1  Transfer From 1: Bed to  Transfer to 1: Stand  Technique 1: Sit to stand, Stand to sit  Transfer Device 1: Gait belt  Transfer Level of Assistance 1: Maximum assistance, +2, Moderate verbal cues  Trials/Comments 1: x2 trials up to 35s each, L knee block for safety, very soft knees. Vc to avoid L lat lean             Outcome Measures:  [facility] Basic Mobility  Turning from your back to your  side while in a flat bed without using bedrails: A lot  Moving from lying on your back to sitting on the side of a flat bed without using bedrails: A lot  Moving to and from bed to chair (including a wheelchair): Total  Standing up from a chair using your arms (e.g. wheelchair or bedside chair): Total  To walk in hospital room: Total  Climbing 3-5 steps with railing: Total  Basic Mobility - Total Score: 8                            Education Documentation  Handouts, taught by Sara Cruz PTA at 7/1/2024  2:55 PM.  Learner: Patient  Readiness: Acceptance  Method: Explanation  Response: Verbalizes Understanding    Body Mechanics, taught by Sara Cruz PTA at 7/1/2024  2:55 PM.  Learner: Patient  Readiness: Acceptance  Method: Explanation  Response: Verbalizes Understanding    Precautions, taught by Sara Cruz PTA at 7/1/2024  2:55 PM.  Learner: Patient  Readiness: Acceptance  Method: Explanation  Response: Verbalizes Understanding    Home Exercise Program, taught by Sara Cruz PTA at 7/1/2024  2:55 PM.  Learner: Patient  Readiness: Acceptance  Method: Explanation  Response: Verbalizes Understanding    ADL Training, taught by Sara Cruz PTA at 7/1/2024  2:55 PM.  Learner: Patient  Readiness: Acceptance  Method: Explanation  Response: Verbalizes Understanding    Handouts, taught by Sara Cruz PTA at 7/1/2024  2:55 PM.  Learner: Patient  Readiness: Acceptance  Method: Explanation  Response: Verbalizes Understanding    Precautions, taught by Sara Cruz PTA at 7/1/2024  2:55 PM.  Learner: Patient  Readiness: Acceptance  Method: Explanation  Response: Verbalizes Understanding    Body Mechanics, taught by Sara Cruz PTA at 7/1/2024  2:55 PM.  Learner: Patient  Readiness: Acceptance  Method: Explanation  Response: Verbalizes Understanding    Home Exercise Program, taught by Sara Cruz PTA at 7/1/2024  2:55 PM.  Learner: Patient  Readiness: Acceptance  Method: Explanation  Response: Verbalizes  Understanding    Mobility Training, taught by Sara Cruz PTA at 7/1/2024  2:55 PM.  Learner: Patient  Readiness: Acceptance  Method: Explanation  Response: Verbalizes Understanding    Education Comments  No comments found.          OP EDUCATION:       Encounter Problems       Encounter Problems (Active)       PT Problem       Patient will perform bed mobility with </= CGA to reduce risk of developing decubitus ulcers.  (Progressing)       Start:  06/27/24    Expected End:  07/11/24            Patient will perform sit to stand and stand to sit transfers with </= MIN A x1 and LRD to increase functional strength.  (Progressing)       Start:  06/27/24    Expected End:  07/11/24            Patient will ambulate at least 30  ft. with </= MIN A x1 and LRD to improve tolerance of community distances.    (Progressing)       Start:  06/27/24    Expected End:  07/11/24            Patient will perform home exercise program as prescribed with cues as needed.   (Progressing)       Start:  06/27/24    Expected End:  07/11/24            LLE >/= 4+/5 throughout (Progressing)       Start:  06/27/24    Expected End:  07/11/24

## 2024-07-01 NOTE — PROGRESS NOTES
Jordon Waddell is a 73 y.o. male on day 5 of admission presenting with Intracranial hemorrhage (Multi).    Subjective   No acute events overnight.      Objective   Heart Rate:  [59-69]   Temp:  [36.4 °C (97.5 °F)-36.9 °C (98.4 °F)]   Resp:  [16-20]   BP: (118-143)/(68-72)   Weight:  [101 kg (223 lb)]   SpO2:  [94 %-97 %]       Physical Exam  Neurological Exam  CV : RRR, Chest: CTAB, GI : soft, non tender, non distended  Mental status: Oriented x4, converses normally with intact language domains. Following simple and complex commands.  CN: Pupils equal and reactive, VF intact, EOM intact, L upper < lower face droop, no dysarthria.  Motor: Rt side 5/5, LUE flicker finger movements, LLE AG with mild drift, some knee flexion, dorsi/plantar flexion 5/5.  Sensory: intact in all extremities with no neglect, however on fine sensory testing there is subtle sensory deficits in the LUE/LLE  Coordination intact FTN In RUE  Gait Deferred.    Legs: not swollen with with no tenderness in the calf, mild tenderness behind the heel.    Relevant Results    NIH Stroke Scale  1A. Level of Consciousness: Alert, Keenly Responsive  1B. Ask Month and Age: Both Questions Right  1C. Blink Eyes & Squeeze Hands: Performs Both Tasks  2. Best Gaze: Normal  3. Visual: No Visual Loss  4. Facial Palsy: Partial Paralysis  5A. Motor - Left Arm: No Movement  5B. Motor - Right Arm: No Drift  6A. Motor - Left Leg: Drift  6B. Motor - Right Leg: No Drift  7. Limb Ataxia: Absent  8. Sensory Loss: Normal  9. Best Language: No Aphasia  10. Dysarthria: Normal  11. Extinction and Inattention: No Abnormality  NIH Stroke Scale: 7       Assessment/Plan      Principal Problem:    Intracranial hemorrhage (Multi)  Jordon Waddell is a 73 y.o.  right handed male with a PMHx of HTN, HLD, DM, CKD2, who presents with R thalamic ICH, slightly enlarged on first repeat CHT, stable on third repeat CTH. Initial /86.  Likely hypertensive in etiology. Admitted for further  management. TTE EF 50-55%, no PFO, LA not well sen. Passed MBSS for modified diet.     #R thalamic ICH  #HTN, HLD  :: Hold home BP meds: HCTZ 12.5mg BID  - Tele  - SBP goal 130-150  - Hold home ASA  - c/w lisinopril ot 20mg BID, Amlodipine 5mg.   - c/w Rosuvastatin 40mg  - SLP failed x2 -> MBSS -> passed for soft-bite sized and mild thick liquids.      #Elevated temp  :: Max 38.8 6/29,   :: Asmyptomatic  - Hold tylenol if possible  - No leukocytosis. Bcx, UA, CXR unrearkable  - sp jennifer/vanc for 48h then stopped.  - Getting DVT us BLE    #Heel pain:    - Ankle XR, no fracture, possible calcaneal spur  - Lidocaine patch    #DM2  - Hold home meds  - ISS and hypoglycemia protocol    #CKD2 - stable Cr    #Dispo: IAR per PT/OT.    DVT ppx: SCDs, SQH Start 6/28.  Code: Full code      Praful Hodge MD

## 2024-07-01 NOTE — PROGRESS NOTES
Vancomycin Dosing by Pharmacy- Cessation of Therapy    Consult to pharmacy for vancomycin dosing has been discontinued by the prescriber, pharmacy will sign off at this time.    Please call pharmacy if there are further questions or re-enter a consult if vancomycin is resumed.     Michi Escamilla, PharmD

## 2024-07-01 NOTE — PROGRESS NOTES
SW met with pt's wife and family members  at bedside to offer support and discuss discharge plan. Pt's wife offered pt's FMLA to SW, and had several questions. SW  provided information to wife and FMLA paper was offered to medical team.     Pt is recommended for high intensity, and [facility] is [facility]. [facility] can accept pt and requests recent PT/OT notes for precert.  will update [facility] once PT/OT note are completed and uploaded.     Joe Stewart, MSSA, LSW

## 2024-07-02 ENCOUNTER — APPOINTMENT (OUTPATIENT)
Dept: VASCULAR MEDICINE | Facility: HOSPITAL | Age: 74
DRG: 064 | End: 2024-07-02
Payer: MEDICARE

## 2024-07-02 LAB
BACTERIA BLD CULT: NORMAL
BASOPHILS # BLD AUTO: 0.03 X10*3/UL (ref 0–0.1)
BASOPHILS NFR BLD AUTO: 0.5 %
CALCIUM SERPL-MCNC: 10.1 MG/DL (ref 8.6–10.6)
EOSINOPHIL # BLD AUTO: 0.28 X10*3/UL (ref 0–0.4)
EOSINOPHIL NFR BLD AUTO: 4.4 %
ERYTHROCYTE [DISTWIDTH] IN BLOOD BY AUTOMATED COUNT: 12.9 % (ref 11.5–14.5)
GLUCOSE BLD MANUAL STRIP-MCNC: 173 MG/DL (ref 74–99)
GLUCOSE BLD MANUAL STRIP-MCNC: 178 MG/DL (ref 74–99)
GLUCOSE BLD MANUAL STRIP-MCNC: 197 MG/DL (ref 74–99)
GLUCOSE BLD MANUAL STRIP-MCNC: 210 MG/DL (ref 74–99)
GLUCOSE BLD MANUAL STRIP-MCNC: 225 MG/DL (ref 74–99)
GLUCOSE BLD MANUAL STRIP-MCNC: 258 MG/DL (ref 74–99)
HCT VFR BLD AUTO: 42 % (ref 41–52)
HGB BLD-MCNC: 14.6 G/DL (ref 13.5–17.5)
IMM GRANULOCYTES # BLD AUTO: 0.03 X10*3/UL (ref 0–0.5)
IMM GRANULOCYTES NFR BLD AUTO: 0.5 % (ref 0–0.9)
LYMPHOCYTES # BLD AUTO: 1.24 X10*3/UL (ref 0.8–3)
LYMPHOCYTES NFR BLD AUTO: 19.6 %
MCH RBC QN AUTO: 29.8 PG (ref 26–34)
MCHC RBC AUTO-ENTMCNC: 34.8 G/DL (ref 32–36)
MCV RBC AUTO: 86 FL (ref 80–100)
MONOCYTES # BLD AUTO: 0.73 X10*3/UL (ref 0.05–0.8)
MONOCYTES NFR BLD AUTO: 11.5 %
NEUTROPHILS # BLD AUTO: 4.02 X10*3/UL (ref 1.6–5.5)
NEUTROPHILS NFR BLD AUTO: 63.5 %
NRBC BLD-RTO: 0 /100 WBCS (ref 0–0)
PLATELET # BLD AUTO: 157 X10*3/UL (ref 150–450)
RBC # BLD AUTO: 4.9 X10*6/UL (ref 4.5–5.9)
WBC # BLD AUTO: 6.3 X10*3/UL (ref 4.4–11.3)

## 2024-07-02 PROCEDURE — 99222 1ST HOSP IP/OBS MODERATE 55: CPT | Performed by: INTERNAL MEDICINE

## 2024-07-02 PROCEDURE — 92526 ORAL FUNCTION THERAPY: CPT | Mod: GN

## 2024-07-02 PROCEDURE — 93970 EXTREMITY STUDY: CPT | Performed by: SURGERY

## 2024-07-02 PROCEDURE — 2500000002 HC RX 250 W HCPCS SELF ADMINISTERED DRUGS (ALT 637 FOR MEDICARE OP, ALT 636 FOR OP/ED)

## 2024-07-02 PROCEDURE — 36415 COLL VENOUS BLD VENIPUNCTURE: CPT | Performed by: STUDENT IN AN ORGANIZED HEALTH CARE EDUCATION/TRAINING PROGRAM

## 2024-07-02 PROCEDURE — 2500000004 HC RX 250 GENERAL PHARMACY W/ HCPCS (ALT 636 FOR OP/ED)

## 2024-07-02 PROCEDURE — 97530 THERAPEUTIC ACTIVITIES: CPT | Mod: GO

## 2024-07-02 PROCEDURE — 99233 SBSQ HOSP IP/OBS HIGH 50: CPT

## 2024-07-02 PROCEDURE — 2500000002 HC RX 250 W HCPCS SELF ADMINISTERED DRUGS (ALT 637 FOR MEDICARE OP, ALT 636 FOR OP/ED): Performed by: STUDENT IN AN ORGANIZED HEALTH CARE EDUCATION/TRAINING PROGRAM

## 2024-07-02 PROCEDURE — 2500000001 HC RX 250 WO HCPCS SELF ADMINISTERED DRUGS (ALT 637 FOR MEDICARE OP)

## 2024-07-02 PROCEDURE — 1100000001 HC PRIVATE ROOM DAILY

## 2024-07-02 PROCEDURE — 93970 EXTREMITY STUDY: CPT

## 2024-07-02 PROCEDURE — 85025 COMPLETE CBC W/AUTO DIFF WBC: CPT | Performed by: STUDENT IN AN ORGANIZED HEALTH CARE EDUCATION/TRAINING PROGRAM

## 2024-07-02 PROCEDURE — 82310 ASSAY OF CALCIUM: CPT | Performed by: STUDENT IN AN ORGANIZED HEALTH CARE EDUCATION/TRAINING PROGRAM

## 2024-07-02 PROCEDURE — 2500000001 HC RX 250 WO HCPCS SELF ADMINISTERED DRUGS (ALT 637 FOR MEDICARE OP): Performed by: STUDENT IN AN ORGANIZED HEALTH CARE EDUCATION/TRAINING PROGRAM

## 2024-07-02 PROCEDURE — 97535 SELF CARE MNGMENT TRAINING: CPT | Mod: GO

## 2024-07-02 PROCEDURE — 2500000005 HC RX 250 GENERAL PHARMACY W/O HCPCS

## 2024-07-02 PROCEDURE — 82947 ASSAY GLUCOSE BLOOD QUANT: CPT

## 2024-07-02 RX ORDER — HYDROCHLOROTHIAZIDE 25 MG/1
12.5 TABLET ORAL DAILY
Status: DISCONTINUED | OUTPATIENT
Start: 2024-07-02 | End: 2024-07-03

## 2024-07-02 ASSESSMENT — ENCOUNTER SYMPTOMS
COLOR CHANGE: 0
SEIZURES: 0
CHANGE IN BOWEL HABIT: 0
VOMITING: 0
PALPITATIONS: 0
FLANK PAIN: 0
SUSPICIOUS LESIONS: 0
ABDOMINAL PAIN: 0
HEMATURIA: 0
BLURRED VISION: 0
POOR WOUND HEALING: 0
DECREASED APPETITE: 0
ALTERED MENTAL STATUS: 0
FEVER: 0
DYSPNEA ON EXERTION: 0
COUGH: 0
BLOATING: 0
DOUBLE VISION: 0
DIZZINESS: 0
EYE PAIN: 0
CHILLS: 0
FOCAL WEAKNESS: 1
NAUSEA: 0
WEIGHT LOSS: 0
SHORTNESS OF BREATH: 0
BRUISES/BLEEDS EASILY: 0
HEMOPTYSIS: 0
PND: 0
HEMATOCHEZIA: 0
MEMORY LOSS: 0
LIGHT-HEADEDNESS: 0
SNORING: 1

## 2024-07-02 ASSESSMENT — COGNITIVE AND FUNCTIONAL STATUS - GENERAL
DRESSING REGULAR LOWER BODY CLOTHING: A LOT
EATING MEALS: A LITTLE
MOBILITY SCORE: 8
CLIMB 3 TO 5 STEPS WITH RAILING: TOTAL
TURNING FROM BACK TO SIDE WHILE IN FLAT BAD: A LOT
DRESSING REGULAR LOWER BODY CLOTHING: A LITTLE
TOILETING: A LOT
HELP NEEDED FOR BATHING: A LITTLE
DAILY ACTIVITIY SCORE: 18
MOVING TO AND FROM BED TO CHAIR: TOTAL
WALKING IN HOSPITAL ROOM: TOTAL
DAILY ACTIVITIY SCORE: 15
MOVING FROM LYING ON BACK TO SITTING ON SIDE OF FLAT BED WITH BEDRAILS: A LOT
PERSONAL GROOMING: A LITTLE
DRESSING REGULAR UPPER BODY CLOTHING: A LOT
PERSONAL GROOMING: A LITTLE
TOILETING: A LITTLE
STANDING UP FROM CHAIR USING ARMS: TOTAL
HELP NEEDED FOR BATHING: A LOT
DRESSING REGULAR UPPER BODY CLOTHING: A LITTLE

## 2024-07-02 ASSESSMENT — ACTIVITIES OF DAILY LIVING (ADL): HOME_MANAGEMENT_TIME_ENTRY: 20

## 2024-07-02 ASSESSMENT — PAIN - FUNCTIONAL ASSESSMENT
PAIN_FUNCTIONAL_ASSESSMENT: 0-10

## 2024-07-02 ASSESSMENT — PAIN SCALES - GENERAL
PAINLEVEL_OUTOF10: 0 - NO PAIN

## 2024-07-02 NOTE — DOCUMENTATION CLARIFICATION NOTE
"    PATIENT:               RAFAELA RIOS  ACCT #:                  [id number]  MRN:                       [id number]  :                       1950  ADMIT DATE:       2024 4:22 PM  DISCH DATE:  RESPONDING PROVIDER #:        [id number]          PROVIDER RESPONSE TEXT:    Brain compression non-traumatic    CDI QUERY TEXT:    Clarification    Instruction:    Based on your assessment of the patient and the clinical information, please provide the requested documentation by clicking on the appropriate radio button and enter any additional information if prompted.    Question: Please further clarify if there is a diagnosis related to the clinical information    When answering this query, please exercise your independent professional judgment. The fact that a question is being asked, does not imply that any particular answer is desired or expected.    The patient's clinical indicators include:  Clinical Information:  Patient is a 73 year old male who presented as a transfer from [facility] for higher level of care in regards to an increasing ICH.    Clinical Indicators:  From the Consults on , \" Slight measurable increase size of intraparenchymal hematoma, 3.1 x 1.9 x 1.6 cm compared with 2.4 x 1.5 x 1.0 cm  with similar degree of mass effect \"    From the CT of the head on , \" There is again evidence of a hyperdense intraparenchymal hemorrhage  centered within the posterior right basal ganglia with cranial extension into the right corona radiata similar in size and configuration when compared with the prior study dated 2024 time 12:35 p.m. \"    Treatment:  Frequent neuro checks, CT of head, safety precautions, telemetry    Risk Factors:  ICH right subcortical area, dysarthria, weakness, facial droop, HTN, HLD, CKD, fall  Options provided:  -- Brain compression non-traumatic  -- Brain compression traumatic  -- Midline shift or mass effect without brain compression  -- Other - I will add my own diagnosis  -- " Refer to Clinical Documentation Reviewer    Query created by: Izabel Lewis on 6/28/2024 5:31 PM      Electronically signed by:  GAGE BREWER MD 7/2/2024 7:32 AM

## 2024-07-02 NOTE — CONSULTS
Reason For Consult  Acute L soleal DVT after initial admission for ICH    History Of Present Illness  Jordon Waddell is a 73 y.o. male with PMHx HTN, HLD, DM, CKD2 with R thalamic ICH on 6/26. Vascular medicine consulted for evaluation and management recommendations regarding acute occlusive L soleal DVT found on imaging on 7/2.     Patient came into hospital with acute onset left-sided weakness of upper and lower extremities on 6/26 (last known normal 0830). Was found on stat brain attack imaging to have acute intraparenchymal hemorrhage on the right posterior limb of the internal capsule just lateral to the right thalamus. Was transferred from [facility] to [facility] for consideration of neurosurgical intervention (not a surgical candidate). Hemorrhage thought to be secondary to HTN. Patient was found to be febrile to 100.6 on 6/27 at 2000. CXR was obtained which showed no acute cardiopulmonary processes, UA was also unremarkable. BCx were drawn, NGTD. Patient was persistently febrile, with Tmax of 101.8 on 6/28. Patient was started on meropenem and vanc at that time. Throughout this time, patient was asymptomic, denying chills, headache, cough, SOB. He had no leukocytosis and no other lab abnormalities. Patient did note pain and tenderness of left medial ankle. After swelling and tenderness were noted on exam and all infectious work up was negative to that point and fevers had not stopped with 48 hours of abx therapy (course from 6/28-6/30), decision was made to obtain BLE duplexes. Imaging was positive for DVT of L soleal vein.    Patient was taking ASA 81mg daily at home previous to presenting, but that was discontinued on admission. He was started on ppx SQH on PBD 2 (6/28). PER HIS WIFE - SCD WERE APPLIED WHEN INITIAL;LY ADMITTED BUT HAVE BEEN OFF WHILE ON T4 FOR THE PAST FEW DAYS. Patient denies any previous history of clots and has never been on a blood thinner. Most recent surgery/hospitalization was in February 2024  for shoulder replacement. Reports mom has a history of thrombotic stroke; was on warfarin previous to this, but patient is unsure what the indication was for AC therapy.     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  HPL  DM    Surgical History  He has a past surgical history that includes Total hip arthroplasty (Left, 2023); Carpal tunnel release (Bilateral, 2000); Tonsillectomy (1957); and Cyst Removal (1970).  SHOULD REPLACEMENT     Social History  He reports that he quit smoking about 39 years ago. His smoking use included cigarettes. He started smoking about 44 years ago. He has a 5 pack-year smoking history. He has never been exposed to tobacco smoke. He has never used smokeless tobacco. He reports that he does not currently use alcohol after a past usage of about 1.0 standard drink of alcohol per week. He reports that he does not use drugs.    Family History  Family History   Problem Relation Name Age of Onset    Cancer Mother Marcela     Stroke Mother Marcela     Hypertension Mother Marcela     Hypertension Father Baltazar     Diabetes Maternal Grandfather Roger clark    NO KNOWN VTE    Allergies  Penicillins    Review of Systems  Review of Systems   Constitutional: Negative for chills, decreased appetite, fever and weight loss.   Eyes:  Negative for blurred vision, double vision, pain and visual disturbance.   Cardiovascular:  Positive for leg swelling. Negative for chest pain, dyspnea on exertion, palpitations and paroxysmal nocturnal dyspnea.  "  Respiratory:  Positive for snoring. Negative for cough, hemoptysis and shortness of breath.    Hematologic/Lymphatic: Does not bruise/bleed easily.   Skin:  Negative for color change, dry skin, poor wound healing and suspicious lesions.   Musculoskeletal:  Positive for joint pain and muscle weakness.   Gastrointestinal:  Negative for bloating, abdominal pain, change in bowel habit, hematochezia, melena, nausea and vomiting.   Genitourinary:  Negative for flank pain and hematuria.   Neurological:  Positive for focal weakness. Negative for dizziness, light-headedness and seizures.   Psychiatric/Behavioral:  Negative for altered mental status and memory loss.       Physical Exam  /76   Pulse 74   Temp 36.6 °C (97.9 °F)   Resp 20   Ht 1.702 m (5' 7\")   Wt 100 kg (221 lb)   SpO2 94%   BMI 34.61 kg/m²   General appearance: alert and oriented, in no acute distress AGREE  Head: normocephalic, without obvious abnormality, atraumatic, mild left-sided facial droop  AGREE  Eyes: Pupils equal and reactive, VF intact, EOM intactNose: no discharge, no crusting or bleeding. AGREE  Neck: no JVD AGREE  Lungs: clear to auscultation bilaterally AGREE  Heart: regular rate and rhythm, S1, S2 normal, no murmur, click, rub or gallop AGREE  Abdomen: normal findings: no masses palpable and soft, non-tender AGREE  Extremities: edema  in LLE with mild tenderness to palpation of the medial posterior calf, no ulcers, gangrene or trophic changes, capillary refill brisk. AGREE NO SCDS APPLIED  Pulses: 2+ and symmetric AGREE  Skin: Skin color, texture, turgor normal. No rashes or lesions AGREE  Neuro: L upper < lower face droop, no dysarthria. Left sided weakness.  AGREE     Last Recorded Vitals  Blood pressure 142/76, pulse 74, temperature 36.6 °C (97.9 °F), resp. rate 20, height 1.702 m (5' 7\"), weight 100 kg (221 lb), SpO2 94%.    Relevant Results  VASC US LOWER EXTREMITY VENOUS DUPLEX BILATERAL   7/2/24    Diagnosis/ICD: " Localized (leg) edema-R60.0  Procedure/CPT: 05648 Peripheral venous duplex scan for DVT complete     **CRITICAL RESULT**  Critical Result: Positive for acute DVT of the left soleal vein.  Notification called to Dr Spicer on 7/2/2024 at 9:17:29 AM by Christina Nuno T.     PRELIMINARY CONCLUSIONS:  Right Lower Venous: No evidence of acute deep vein thrombus visualized in the right lower extremity.  Left Lower Venous: There is acute occlusive deep vein thrombosis visualized in the soleal vein. The remainder of the left leg is negative for deep vein thrombosis.    === 06/27/24 ===    CT HEAD WO IV CONTRAST    - Impression -  There has been no significant interval change when compared with the  prior study dated 06/26/2024 time 12:35 p.m. as described above.    MACRO:  None.    Signed by: Aaron Soto 6/27/2024 6:56 AM  Dictation workstation:   [id number]    === 06/26/24 at  2141===    CT HEAD WO IV CONTRAST    - Impression -  There has been no significant interval change when compared with the  prior study dated 06/26/2024 time 12:35 p.m. as described above.    MACRO:  None.    Signed by: Aaron Soto 6/27/2024 6:56 AM  Dictation workstation:   [id number]     === 06/26/24 at 1241===    CT HEAD WO IV CONTRAST    - Impression -  Slight measurable increase size of intraparenchymal hematoma, 3.1 x  1.9 x 1.6 cm compared with 2.4 x 1.5 x 1.0 cm  with similar degree of  mass effect.      MACRO:  Kadeem Yip discussed the significance and urgency of this  critical finding by secure chat with  LISA RUGGIERO on 6/26/2024 at  12:53 pm.  (**-RCF-**) Findings:  See findings.      Signed by: Kadeem Yip 6/26/2024 12:53 PM  Dictation workstation:   [id number]    === 06/26/24 at 0920===    CT HEAD WO IV CONTRAST    - Impression -  Intraparenchymal hemorrhage on the right in the posterior limb of the  internal capsule just lateral to the right thalamus with maximum  dimension of 2.4 cm      MACRO:  Myrna  Beronica discussed the significance and urgency of this  critical finding by telephone with  LISA RUGGIERO on 6/26/2024 at  9:18 am.  (**-RCF-**) Findings:  See findings.      Signed by: Myrna Loco 6/26/2024 9:20 AM  Dictation workstation:   [id number]    Results for orders placed or performed during the hospital encounter of 06/26/24 (from the past 24 hour(s))   POCT GLUCOSE   Result Value Ref Range    POCT Glucose 219 (H) 74 - 99 mg/dL   POCT GLUCOSE   Result Value Ref Range    POCT Glucose 173 (H) 74 - 99 mg/dL   POCT GLUCOSE   Result Value Ref Range    POCT Glucose 197 (H) 74 - 99 mg/dL   POCT GLUCOSE   Result Value Ref Range    POCT Glucose 178 (H) 74 - 99 mg/dL   Lower extremity venous duplex bilateral   Result Value Ref Range    BSA 2.18 m2   Calcium   Result Value Ref Range    Calcium 10.1 8.6 - 10.6 mg/dL   CBC and Auto Differential   Result Value Ref Range    WBC 6.3 4.4 - 11.3 x10*3/uL    nRBC 0.0 0.0 - 0.0 /100 WBCs    RBC 4.90 4.50 - 5.90 x10*6/uL    Hemoglobin 14.6 13.5 - 17.5 g/dL    Hematocrit 42.0 41.0 - 52.0 %    MCV 86 80 - 100 fL    MCH 29.8 26.0 - 34.0 pg    MCHC 34.8 32.0 - 36.0 g/dL    RDW 12.9 11.5 - 14.5 %    Platelets 157 150 - 450 x10*3/uL    Neutrophils % 63.5 40.0 - 80.0 %    Immature Granulocytes %, Automated 0.5 0.0 - 0.9 %    Lymphocytes % 19.6 13.0 - 44.0 %    Monocytes % 11.5 2.0 - 10.0 %    Eosinophils % 4.4 0.0 - 6.0 %    Basophils % 0.5 0.0 - 2.0 %    Neutrophils Absolute 4.02 1.60 - 5.50 x10*3/uL    Immature Granulocytes Absolute, Automated 0.03 0.00 - 0.50 x10*3/uL    Lymphocytes Absolute 1.24 0.80 - 3.00 x10*3/uL    Monocytes Absolute 0.73 0.05 - 0.80 x10*3/uL    Eosinophils Absolute 0.28 0.00 - 0.40 x10*3/uL    Basophils Absolute 0.03 0.00 - 0.10 x10*3/uL   POCT GLUCOSE   Result Value Ref Range    POCT Glucose 258 (H) 74 - 99 mg/dL   POCT GLUCOSE   Result Value Ref Range    POCT Glucose 225 (H) 74 - 99 mg/dL        Scheduled medications  amLODIPine, 10 mg, oral,  Daily  cholecalciferol, 1,000 Units, oral, Daily  heparin (porcine), 5,000 Units, subcutaneous, q8h LISA  hydroCHLOROthiazide, 12.5 mg, oral, Daily  insulin lispro, 0-5 Units, subcutaneous, q4h  lidocaine, 1 patch, transdermal, Daily  lisinopril, 20 mg, oral, BID  melatonin, 6 mg, oral, Daily  perflutren protein A microsphere, 0.5 mL, intravenous, Once in imaging  polyethylene glycol, 17 g, oral, Daily  rosuvastatin, 40 mg, oral, Nightly  sennosides-docusate sodium, 2 tablet, oral, BID  sulfur hexafluoride microsphr, 2 mL, intravenous, Once in imaging      Continuous medications     PRN medications  PRN medications: [Held by provider] acetaminophen, dextrose, dextrose, glucagon, glucagon, ondansetron **OR** ondansetron, oxygen    Assessment/Plan   Jordon Waddell is a 73 y.o. male with PMHx HTN, HLD, DM, CKD2 with R thalamic ICH on 6/26. Vascular medicine consulted for evaluation and management recommendations regarding acute occlusive L soleal DVT found on imaging on 7/2. Patient with very high risk of repeat hemorrhaging, do not recommend therapeutic AC at this time iso recent ICH. Based on chart review, patient became febrile prior to initiation of VTE ppx (first fever 6/27, VTE ppx started 6/28). Continue current VTE ppx to prevent further acute DVT formation. Soleal DVT at low risk for further propagation, however will monitor with serial US (twice weekly for 2 weeks) to monitor for propagation. SEE MY NOTE BELOW.    Recommendations:  - Continue VTE ppx while inpatient. Plan to discontinue after patient is discharged home from rehab if mobility is appropriate SEE MY NOTE BELOW  - Obtain serial LLE US to monitor for clot propagation (twice weekly for 2 weeks; next US should be done Fri 7/5 whether pt is still admitted or has been dc'd to rehab) AGREE  - SCDs need to be in place at all times, except when patient is mobilizing AGREE  - no need for therapeutic AC at this time, very high risk for hemorrhage, denise iso  "recent ICH    Patient staffed with attending physician, Caryn Love MD.   Plan not finalized until final attestation provided by attending.     Candice Inman, DO  Family Medicine, R2      I saw and evaluated the patient. I personally obtained the key and critical portions of the history and physical exam or was physically present for key and critical portions performed by the resident/fellow. I reviewed the resident/fellow's documentation and discussed the patient with the resident/fellow. I agree with the resident/fellow's medical decision making as documented in the note with the exception/addition of the following:    MY ADDITIONS ARE NOTED IN \"CAPS\".  HX AND PE CONFIRMED    RISK FOR PROPAGATION CAN BE AS HIGH AS 40% IN NS PATIENTS - THEREFORE SERIAL IMAGING IS NEEDED TO FOLLOW THIS CLOSELY. AGREE 2 X A WEEK FOR APPROX 2 WEEKS SHOULD BE SUFFICIENT - THEN FOLLOW CLINICALLY FOR ANY CHANGE IN SX.     CONTINUE PROPHYLAXIS NOW AND WHEN HE IS DISCHARGED TO REHAB. UFH TID OR LOVENOX 40 MG DAILY.    SCD SHOULD BE APPLIED AT ALL TIMES    VM WILL S/O - PLEASE ORDER THE DUPLEXES AS NOTED  PLEASE RECONSULT FOR ANY ADDITIONAL CONCERNS OR CHANGE IN IMAGING           Caryn Love MD    "

## 2024-07-02 NOTE — PROGRESS NOTES
Occupational Therapy                 Therapy Communication Note    Patient Name: Jordon Waddell  MRN: [id number]  Today's Date: 7/2/2024     Discipline: Occupational Therapy    Missed Visit Reason: Missed Visit Reason: Patient in a medical procedure    Missed Time: Attempt    Comment:

## 2024-07-02 NOTE — PROGRESS NOTES
Jordon Waddell is a 73 y.o. male on day 6 of admission presenting with Intracranial hemorrhage (Multi).    Subjective   No acute events overnight.      Objective   Heart Rate:  [60-76]   Temp:  [36 °C (96.8 °F)-37.2 °C (99 °F)]   Resp:  [17-18]   BP: (109-139)/(66-73)   Weight:  [100 kg (221 lb)]   SpO2:  [94 %-97 %]       Physical Exam  Neurological Exam  CV : RRR, Chest: CTAB, GI : soft, non tender, non distended  Mental status: Oriented x4, converses normally with intact language domains. Following simple and complex commands.  CN: Pupils equal and reactive, VF intact, EOM intact, L upper < lower face droop, no dysarthria.  Motor: Rt side 5/5, LUE flicker finger movements, LLE AG with mild drift, some knee flexion, dorsi/plantar flexion 5/5.  Sensory: intact in all extremities with no neglect, however on fine sensory testing there is subtle sensory deficits in the LUE/LLE  Coordination intact FTN In RUE  Gait Deferred.    Legs: not swollen with with no tenderness in the calf, mild tenderness behind the heel.    Relevant Results    NIH Stroke Scale  1A. Level of Consciousness: Alert, Keenly Responsive  1B. Ask Month and Age: Both Questions Right  1C. Blink Eyes & Squeeze Hands: Performs Both Tasks  2. Best Gaze: Normal  3. Visual: No Visual Loss  4. Facial Palsy: Partial Paralysis  5A. Motor - Left Arm: No Movement  5B. Motor - Right Arm: No Drift  6A. Motor - Left Leg: Drift  6B. Motor - Right Leg: No Drift  7. Limb Ataxia: Absent  8. Sensory Loss: Normal  9. Best Language: No Aphasia  10. Dysarthria: Normal  11. Extinction and Inattention: No Abnormality  NIH Stroke Scale: 7       Assessment/Plan      Principal Problem:    Intracranial hemorrhage (Multi)  Active Problems:    Leg pain  Jordon Waddell is a 73 y.o.  right handed male with a PMHx of HTN, HLD, DM, CKD2, who presents with R thalamic ICH, slightly enlarged on first repeat CHT, stable on third repeat CTH. Initial /86.  Likely hypertensive in  etiology. Admitted for further management. TTE EF 50-55%, no PFO, LA not well sen. Passed MBSS for modified diet.     #R thalamic ICH  #HTN, HLD  - Tele  - SBP goal 130-150  - Hold home ASA  - c/w lisinopril ot 20mg BID, Amlodipine 5mg.   - Start hydrochlorothiazide 12.5 Daily  - c/w Rosuvastatin 40mg  - SLP failed x2 -> MBSS -> passed for soft-bite sized and mild thick liquids.      #Elevated temp  :: Max 38.8 6/29,   :: Asmyptomatic  - Hold tylenol if possible  - No leukocytosis. Bcx, UA, CXR unrearkable  - sp jennifer/vanc for 48h then stopped.  [x] DVT US BLE - New acute left soleal DVT  - Consult vascular medicine for recommendations given ICH      #Heel pain:    - Ankle XR, no fracture, possible calcaneal spur  - Lidocaine patch    #DM2  - Hold home meds  - ISS and hypoglycemia protocol    #CKD2 - stable Cr    #Dispo: IAR per PT/OT.    DVT ppx: SCDs, SQH Start 6/28.  Code: Full code      Praful Hodge MD

## 2024-07-02 NOTE — DOCUMENTATION CLARIFICATION NOTE
"    PATIENT:               RAFAELA RIOS  ACCT #:                  [id number]  MRN:                       [id number]  :                       1950  ADMIT DATE:       2024 4:22 PM  DISCH DATE:  RESPONDING PROVIDER #:        [id number]          PROVIDER RESPONSE TEXT:    Cerebral Edema    CDI QUERY TEXT:    Clarification    Instruction:    Based on your assessment of the patient and the clinical information, please provide the requested documentation by clicking on the appropriate radio button and enter any additional information if prompted.    Question: Please further clarify if there is a diagnosis related to the clinical information    When answering this query, please exercise your independent professional judgment. The fact that a question is being asked, does not imply that any particular answer is desired or expected.    The patient's clinical indicators include:  Clinical Information:  Patient is a 73 year old male who presented as a transfer from [facility] for higher level of care in regards to an increasing ICH.    Clinical Indicators:  From the CT of the head on , \" There is mild surrounding hypo-density compatible with surrounding edema \"    Treatment:  Frequent neuro checks, CT of head, safety precautions, telemetry    Risk Factors:  ICH right subcortical area, dysarthria, weakness, facial droop, HTN, HLD, CKD, fall  Options provided:  -- Cerebral Edema  -- Radiological findings are clinically insignificant  -- Other - I will add my own diagnosis  -- Refer to Clinical Documentation Reviewer    Query created by: Izabel Lewis on 2024 5:25 PM      Electronically signed by:  GAGE BREWER MD 2024 7:32 AM          "

## 2024-07-02 NOTE — PROGRESS NOTES
Physical Therapy                 Therapy Communication Note    Patient Name: Jordon Waddell  MRN: [id number]  Today's Date: 7/2/2024     Discipline: Physical Therapy    Missed Visit Reason: Missed Visit Reason: Patient in a medical procedure    Missed Time: Attempt    Comment:

## 2024-07-02 NOTE — PROGRESS NOTES
Occupational Therapy    Occupational Therapy Treatment    Name: Jordon Waddell  MRN: [id number]  : 1950  Date: 24  Time Calculation  Start Time: 1033  Stop Time: 1104  Time Calculation (min): 31 min    Assessment:  OT Assessment: Pt is a 73 year old male who demonstrates decreased strength, balance, activity tolerance, and coordination, which impedes occupational performance.  Prognosis: Good  Barriers to Discharge: Inaccessible home environment  Evaluation/Treatment Tolerance: Patient tolerated treatment well  Medical Staff Made Aware: Yes  End of Session Communication: Bedside nurse  End of Session Patient Position: Bed, 3 rail up, Alarm on  Plan:  Treatment Interventions: ADL retraining, Functional transfer training, UE strengthening/ROM, Endurance training, Patient/family training, Equipment evaluation/education, Neuromuscular reeducation, Fine motor coordination activities, Compensatory technique education, UE splinting  OT Frequency: 4 times per week  OT Discharge Recommendations: High intensity level of continued care  Equipment Recommended upon Discharge:  (TBD)  OT Recommended Transfer Status: Assist of 2  OT - OK to Discharge: Yes    Subjective   Previous Visit Info:  OT Last Visit  OT Received On: 24  General:  General  Reason for Referral: Pt p/w sudden onset left sided weakness and L sided FD after fall w/o LOC. Found to have R BG ICH ~1.4 ml at 0900 24. Expanded to 2.9 ml at 1230.  No acute neurosurgical intervention. Likely hypertensive etiology. [facility] 2141 stable. GCS 15, NIHSS 7.  Past Medical History Relevant to Rehab: HTN, HLD, DM, CKD2, L total shoulder replacement 2024, BETH several years ago  Family/Caregiver Present: Yes  Caregiver Feedback: wife present  Prior to Session Communication: Bedside nurse  Patient Position Received: Bed, 3 rail up, Alarm on  General Comment: L kendra, pleasant, cooperative  Precautions:  Medical Precautions: Cardiac precautions, Fall  precautions  Vitals:     Pain Assessment:  Pain Assessment  Pain Assessment: 0-10  0-10 (Numeric) Pain Score: 0 - No pain  Pain Type:  (reports h/o L shoulder sx 4 mo ago needs care with transfers, reports L pain ankle but not in session)     Objective   Cognition:  Overall Cognitive Status: Within Functional Limits  Orientation Level: Oriented X4  Insight: Within function limits  Activities of Daily Living:      Grooming  Grooming Level of Assistance:  (supine pt performed shaving electric razor I setup RUE, extended time to complete. pt performed oral care min A setup toothpaste to toothbrush +dysmetria 1x, pt performed oral care SBA setup, min A item placement with use 1 UE)    UE Bathing  UE Bathing Level of Assistance:  (max A wash and dry hair seated EOB)         UE Dressing  UE Dressing Level of Assistance:  (mod A adjust gown seated, max A stefani/doff sling LUE supine)    LE Dressing  LE Dressing:  (max A stefani/doff socks supine, stefani/doff R green waffle boot supine max A)         Bed Mobility/Transfers: Bed Mobility  Bed Mobility: Yes  Bed Mobility 1  Level of Assistance 1:  (sup/sit max A, extended time 2x att to complete with extended rest breaks required, boost in bed and re position 2x max A x2. rolling each way mod A)        Sitting Balance:  Dynamic Sitting Balance  Dynamic Sitting-Balance:  (seated EOB mod A to CGA with RUE support on knee, retro/L lateral lean min-mod VC's and tactile cues to correct. Seated EOB >10 min)     Therapy/Activity: Therapeutic Exercise  Therapeutic Exercise Performed:  (supine LUE PROM 1x10 reps shoulder 0-90 degrees flex/ext, elbow flex/ext, open/close grasp, wrist flex/ext, LUE positioned in neutral on pillow post mob)     Outcome Measures:  [facility] Daily Activity  Putting on and taking off regular lower body clothing: A lot  Bathing (including washing, rinsing, drying): A lot  Putting on and taking off regular upper body clothing: A lot  Toileting, which includes using  toilet, bedpan or urinal: A lot  Taking care of personal grooming such as brushing teeth: A little  Eating Meals: None  Daily Activity - Total Score: 15        Education Documentation  Handouts, taught by Sienna Salgado OT at 7/2/2024 11:50 AM.  Learner: Significant Other, Patient  Readiness: Acceptance  Method: Explanation, Demonstration  Response: Verbalizes Understanding, Needs Reinforcement    Body Mechanics, taught by Sienna Salgado OT at 7/2/2024 11:50 AM.  Learner: Significant Other, Patient  Readiness: Acceptance  Method: Explanation, Demonstration  Response: Verbalizes Understanding, Needs Reinforcement    Precautions, taught by Sienna Salgado OT at 7/2/2024 11:50 AM.  Learner: Significant Other, Patient  Readiness: Acceptance  Method: Explanation, Demonstration  Response: Verbalizes Understanding, Needs Reinforcement    Home Exercise Program, taught by Sienna Salgado OT at 7/2/2024 11:50 AM.  Learner: Significant Other, Patient  Readiness: Acceptance  Method: Explanation, Demonstration  Response: Verbalizes Understanding, Needs Reinforcement    ADL Training, taught by Sienna Salgado OT at 7/2/2024 11:50 AM.  Learner: Significant Other, Patient  Readiness: Acceptance  Method: Explanation, Demonstration  Response: Verbalizes Understanding, Needs Reinforcement    Education Comments  No comments found.      Goals:  Encounter Problems       Encounter Problems (Active)       ADLs       Patient with complete upper body dressing with stand by assist level of assistance donning and doffing all UE clothes with PRN adaptive equipment and compensatory technique  (Progressing)       Start:  06/27/24    Expected End:  07/11/24            Patient with complete lower body dressing with minimal assist  level of assistance donning and doffing all LE clothes  with PRN adaptive equipment (Progressing)       Start:  06/27/24    Expected End:  07/11/24            Patient will feed self with modified independent  level of assistance and using PRN adaptive equipment and compensatory technique (Progressing)       Start:  06/27/24    Expected End:  07/11/24            Patient will complete daily grooming tasks with modified independent level of assistance and PRN adaptive equipment and compensatory technique. (Progressing)       Start:  06/27/24    Expected End:  07/11/24               EXERCISE/STRENGTHENING       Patient will be educated on LUE HEP for increased ADL performance. (Progressing)       Start:  06/27/24    Expected End:  07/11/24            Pt will increase LUE strength for optimal participation in ADLs/IADLs and functional mobility.  (Progressing)       Start:  06/27/24    Expected End:  07/11/24               TRANSFERS       Patient will complete functional transfer to chair with least restrictive device with minimal assist  level of assistance. (Progressing)       Start:  06/27/24    Expected End:  07/11/24

## 2024-07-02 NOTE — PROGRESS NOTES
[facility] is reviewing patient's chart. They are requesting an OT/PT evals; however, the patient was in a medical procedure and therapy was unable to complete. W will notify  when the notes are uploaded.     Addendum:   FLORENTINW received the signed [place] medical forms from the medical team. FLORENTINW faxed and emailed the forms to ensure they arrive to the [employer]. FLORENTINW provided his office phone number and email if the company had any questions or needs.      Addendum:  FLORENTINW received notification in order to move forward with the pre-cert, this patient will need a PT evaluation for today, so that the OT evaluation won't  while waiting for his PT eval. LSW reached out the the therapist.  Pre-Cert has been submitted

## 2024-07-02 NOTE — PROGRESS NOTES
Speech-Language Pathology    SLP Adult Inpatient  Speech-Language Pathology Treatment     Patient Name: Jordon Waddell  MRN: [id number]  Today's Date: 7/2/2024  Time Calculation  Start Time: 1207  Stop Time: 1239  Time Calculation (min): 32 min         Assessment/Impression:   Mild/moderate oropharyngeal dysphagia per MBSS 6/28- improving        Recommendations:  Solid Diet Recommendations : Easy to Chew   Liquid Diet Recommendations: Mildly/nectar thick (IDDSI Level 2)   *Free Water Protocol- allow 4 oz un-thickened water between meals after oral care.   Medication Administration: Per pt preference      Safe Swallow Strategies/Guidelines:  Only present PO intake when awake and alert  Supervision/assist w/ PO intake to assure adherence to safe swallow strategies   Upright positioning   Slow rate/small boluses         Plan:  SLP Plan: Skilled SLP warranted  SLP Frequency: 2x per week  Duration: 3 weeks      Discussed POC: Patient, Medical Team   Discussed Risks/Benefits: Yes  Patient/Caregiver Agreeable: Yes  Continued SLP services at next level of care        Goals:  Pt will tolerate least restrictive diet with adequate oral phase and no s/s of aspiration.              Date initiated: 6/28/24              Status: Progressing      Pt will adhere to safe swallow strategies during PO intake with > 90% accuracy independently.                 Date initiated: 6/28/24              Status: Progressing      Pt will complete 10 reps of pharyngeal strengthening exercises x3 during session with good effort and efficiency given min cues for improved swallow safety and efficiency               Date initiated: 6/28/24              Status: Progressing        Subjective:  Pt properly identified and treated bedside.  Awake and alert, with no c/o pain.  Room air.  Wife present.      Pt admitted w/ L weakness and dysarthria- R thalamic ICH.  MBSS completed 6/28 showed mild/moderate oropharyngeal dysphagia characterized by reduced oral  phase impact by L weakness, delayed swallow initiation, reduced TBR and pharyngeal stripping.  Consistent deep penetration during the swallow with thin liquids, not entirely ejected.  One instance of silent aspiration following the swallow.  Mild residue following the swallow at the valleculae with solids.            Objective:   Pt reports good tolerance of current diet- soft & bite sized solids with mildly/nectar thick liquids.  Expresses significant distaste for thickened liquids.  Denies s/s of aspiration w/ PO intake.  Pt participated in a therapeutic trial feed to assess for diet tolerance/upgrade and use of safe swallow strategies.  Adequate mastication and bolus formation/transit with easy to chew and regular solids.  No oral residue noted.  No overt s/s of aspiration with mildly/nectar thick liquids.  Pt participated in trials of thin liquid via straw.  No s/s of aspiration noted following small single sips x4.  S/s of aspiration noted following larger boluses in 1/4 trials and with successive boluses in 1/1 trials.  Recommend upgrade to Easy to Chew solids with continued mildly/nectar thick liquids.  Free water protocol- allow 4 oz un-thickened liquids between meals after oral care.  Education provided to patient regarding diet recommendations, s/s of aspiration, safe swallow strategies, and plan of care.  Understanding indicated.          07/02/24 at 5:40 PM - Nell Jasmine, SLP

## 2024-07-03 LAB
BASOPHILS # BLD AUTO: 0.04 X10*3/UL (ref 0–0.1)
BASOPHILS NFR BLD AUTO: 0.6 %
EOSINOPHIL # BLD AUTO: 0.29 X10*3/UL (ref 0–0.4)
EOSINOPHIL NFR BLD AUTO: 4.1 %
ERYTHROCYTE [DISTWIDTH] IN BLOOD BY AUTOMATED COUNT: 12.9 % (ref 11.5–14.5)
GLUCOSE BLD MANUAL STRIP-MCNC: 171 MG/DL (ref 74–99)
GLUCOSE BLD MANUAL STRIP-MCNC: 172 MG/DL (ref 74–99)
GLUCOSE BLD MANUAL STRIP-MCNC: 180 MG/DL (ref 74–99)
GLUCOSE BLD MANUAL STRIP-MCNC: 205 MG/DL (ref 74–99)
GLUCOSE BLD MANUAL STRIP-MCNC: 238 MG/DL (ref 74–99)
GLUCOSE BLD MANUAL STRIP-MCNC: 313 MG/DL (ref 74–99)
HCT VFR BLD AUTO: 42.4 % (ref 41–52)
HGB BLD-MCNC: 14.3 G/DL (ref 13.5–17.5)
IMM GRANULOCYTES # BLD AUTO: 0.03 X10*3/UL (ref 0–0.5)
IMM GRANULOCYTES NFR BLD AUTO: 0.4 % (ref 0–0.9)
LYMPHOCYTES # BLD AUTO: 1.22 X10*3/UL (ref 0.8–3)
LYMPHOCYTES NFR BLD AUTO: 17.3 %
MCH RBC QN AUTO: 29.4 PG (ref 26–34)
MCHC RBC AUTO-ENTMCNC: 33.7 G/DL (ref 32–36)
MCV RBC AUTO: 87 FL (ref 80–100)
MONOCYTES # BLD AUTO: 0.82 X10*3/UL (ref 0.05–0.8)
MONOCYTES NFR BLD AUTO: 11.6 %
NEUTROPHILS # BLD AUTO: 4.65 X10*3/UL (ref 1.6–5.5)
NEUTROPHILS NFR BLD AUTO: 66 %
NRBC BLD-RTO: 0 /100 WBCS (ref 0–0)
PLATELET # BLD AUTO: 166 X10*3/UL (ref 150–450)
RBC # BLD AUTO: 4.87 X10*6/UL (ref 4.5–5.9)
WBC # BLD AUTO: 7.1 X10*3/UL (ref 4.4–11.3)

## 2024-07-03 PROCEDURE — 97530 THERAPEUTIC ACTIVITIES: CPT | Mod: GP,CQ

## 2024-07-03 PROCEDURE — 85025 COMPLETE CBC W/AUTO DIFF WBC: CPT | Performed by: STUDENT IN AN ORGANIZED HEALTH CARE EDUCATION/TRAINING PROGRAM

## 2024-07-03 PROCEDURE — 36415 COLL VENOUS BLD VENIPUNCTURE: CPT | Performed by: STUDENT IN AN ORGANIZED HEALTH CARE EDUCATION/TRAINING PROGRAM

## 2024-07-03 PROCEDURE — 2500000001 HC RX 250 WO HCPCS SELF ADMINISTERED DRUGS (ALT 637 FOR MEDICARE OP): Performed by: STUDENT IN AN ORGANIZED HEALTH CARE EDUCATION/TRAINING PROGRAM

## 2024-07-03 PROCEDURE — 2500000004 HC RX 250 GENERAL PHARMACY W/ HCPCS (ALT 636 FOR OP/ED): Performed by: STUDENT IN AN ORGANIZED HEALTH CARE EDUCATION/TRAINING PROGRAM

## 2024-07-03 PROCEDURE — 2500000001 HC RX 250 WO HCPCS SELF ADMINISTERED DRUGS (ALT 637 FOR MEDICARE OP)

## 2024-07-03 PROCEDURE — 2500000004 HC RX 250 GENERAL PHARMACY W/ HCPCS (ALT 636 FOR OP/ED)

## 2024-07-03 PROCEDURE — 99232 SBSQ HOSP IP/OBS MODERATE 35: CPT

## 2024-07-03 PROCEDURE — 1100000001 HC PRIVATE ROOM DAILY

## 2024-07-03 PROCEDURE — 2500000002 HC RX 250 W HCPCS SELF ADMINISTERED DRUGS (ALT 637 FOR MEDICARE OP, ALT 636 FOR OP/ED)

## 2024-07-03 PROCEDURE — 2500000002 HC RX 250 W HCPCS SELF ADMINISTERED DRUGS (ALT 637 FOR MEDICARE OP, ALT 636 FOR OP/ED): Performed by: STUDENT IN AN ORGANIZED HEALTH CARE EDUCATION/TRAINING PROGRAM

## 2024-07-03 PROCEDURE — 82947 ASSAY GLUCOSE BLOOD QUANT: CPT

## 2024-07-03 PROCEDURE — 2500000005 HC RX 250 GENERAL PHARMACY W/O HCPCS

## 2024-07-03 RX ORDER — HYDROCHLOROTHIAZIDE 25 MG/1
12.5 TABLET ORAL 2 TIMES DAILY
Status: DISCONTINUED | OUTPATIENT
Start: 2024-07-03 | End: 2024-07-10 | Stop reason: HOSPADM

## 2024-07-03 RX ORDER — AMLODIPINE BESYLATE 10 MG/1
10 TABLET ORAL DAILY
Start: 2024-07-04

## 2024-07-03 RX ORDER — HEPARIN SODIUM 5000 [USP'U]/ML
5000 INJECTION, SOLUTION INTRAVENOUS; SUBCUTANEOUS EVERY 8 HOURS SCHEDULED
Start: 2024-07-03 | End: 2024-07-09 | Stop reason: HOSPADM

## 2024-07-03 SDOH — SOCIAL STABILITY: SOCIAL INSECURITY: DO YOU FEEL ANYONE HAS EXPLOITED OR TAKEN ADVANTAGE OF YOU FINANCIALLY OR OF YOUR PERSONAL PROPERTY?: NO

## 2024-07-03 SDOH — ECONOMIC STABILITY: HOUSING INSECURITY
IN THE LAST 12 MONTHS, WAS THERE A TIME WHEN YOU DID NOT HAVE A STEADY PLACE TO SLEEP OR SLEPT IN A SHELTER (INCLUDING NOW)?: NO

## 2024-07-03 SDOH — ECONOMIC STABILITY: TRANSPORTATION INSECURITY
IN THE PAST 12 MONTHS, HAS THE LACK OF TRANSPORTATION KEPT YOU FROM MEDICAL APPOINTMENTS OR FROM GETTING MEDICATIONS?: NO

## 2024-07-03 SDOH — SOCIAL STABILITY: SOCIAL INSECURITY: ARE YOU OR HAVE YOU BEEN THREATENED OR ABUSED PHYSICALLY, EMOTIONALLY, OR SEXUALLY BY ANYONE?: NO

## 2024-07-03 SDOH — SOCIAL STABILITY: SOCIAL INSECURITY: DOES ANYONE TRY TO KEEP YOU FROM HAVING/CONTACTING OTHER FRIENDS OR DOING THINGS OUTSIDE YOUR HOME?: NO

## 2024-07-03 SDOH — ECONOMIC STABILITY: INCOME INSECURITY: HOW HARD IS IT FOR YOU TO PAY FOR THE VERY BASICS LIKE FOOD, HOUSING, MEDICAL CARE, AND HEATING?: NOT HARD AT ALL

## 2024-07-03 SDOH — ECONOMIC STABILITY: HOUSING INSECURITY: IN THE LAST 12 MONTHS, HOW MANY PLACES HAVE YOU LIVED?: 1

## 2024-07-03 SDOH — SOCIAL STABILITY: SOCIAL INSECURITY: HAVE YOU HAD ANY THOUGHTS OF HARMING ANYONE ELSE?: NO

## 2024-07-03 SDOH — ECONOMIC STABILITY: INCOME INSECURITY: IN THE LAST 12 MONTHS, WAS THERE A TIME WHEN YOU WERE NOT ABLE TO PAY THE MORTGAGE OR RENT ON TIME?: NO

## 2024-07-03 SDOH — SOCIAL STABILITY: SOCIAL INSECURITY: HAVE YOU HAD THOUGHTS OF HARMING ANYONE ELSE?: NO

## 2024-07-03 SDOH — SOCIAL STABILITY: SOCIAL INSECURITY: HAS ANYONE EVER THREATENED TO HURT YOUR FAMILY OR YOUR PETS?: NO

## 2024-07-03 SDOH — SOCIAL STABILITY: SOCIAL INSECURITY: ABUSE: ADULT

## 2024-07-03 SDOH — SOCIAL STABILITY: SOCIAL INSECURITY: ARE THERE ANY APPARENT SIGNS OF INJURIES/BEHAVIORS THAT COULD BE RELATED TO ABUSE/NEGLECT?: NO

## 2024-07-03 SDOH — ECONOMIC STABILITY: TRANSPORTATION INSECURITY
IN THE PAST 12 MONTHS, HAS LACK OF TRANSPORTATION KEPT YOU FROM MEETINGS, WORK, OR FROM GETTING THINGS NEEDED FOR DAILY LIVING?: NO

## 2024-07-03 SDOH — SOCIAL STABILITY: SOCIAL INSECURITY: WERE YOU ABLE TO COMPLETE ALL THE BEHAVIORAL HEALTH SCREENINGS?: YES

## 2024-07-03 SDOH — SOCIAL STABILITY: SOCIAL INSECURITY: DO YOU FEEL UNSAFE GOING BACK TO THE PLACE WHERE YOU ARE LIVING?: NO

## 2024-07-03 ASSESSMENT — COGNITIVE AND FUNCTIONAL STATUS - GENERAL
WALKING IN HOSPITAL ROOM: TOTAL
PERSONAL GROOMING: A LITTLE
DRESSING REGULAR UPPER BODY CLOTHING: A LOT
WALKING IN HOSPITAL ROOM: TOTAL
CLIMB 3 TO 5 STEPS WITH RAILING: TOTAL
WALKING IN HOSPITAL ROOM: TOTAL
PERSONAL GROOMING: A LITTLE
DRESSING REGULAR LOWER BODY CLOTHING: A LOT
DAILY ACTIVITIY SCORE: 15
TURNING FROM BACK TO SIDE WHILE IN FLAT BAD: A LOT
TOILETING: A LOT
MOBILITY SCORE: 8
STANDING UP FROM CHAIR USING ARMS: TOTAL
MOVING FROM LYING ON BACK TO SITTING ON SIDE OF FLAT BED WITH BEDRAILS: A LOT
HELP NEEDED FOR BATHING: A LOT
MOVING FROM LYING ON BACK TO SITTING ON SIDE OF FLAT BED WITH BEDRAILS: A LOT
TURNING FROM BACK TO SIDE WHILE IN FLAT BAD: A LOT
PERSONAL GROOMING: A LITTLE
CLIMB 3 TO 5 STEPS WITH RAILING: TOTAL
MOBILITY SCORE: 9
MOVING FROM LYING ON BACK TO SITTING ON SIDE OF FLAT BED WITH BEDRAILS: A LOT
HELP NEEDED FOR BATHING: A LOT
TOILETING: A LOT
DRESSING REGULAR LOWER BODY CLOTHING: A LOT
MOVING FROM LYING ON BACK TO SITTING ON SIDE OF FLAT BED WITH BEDRAILS: A LOT
DRESSING REGULAR LOWER BODY CLOTHING: A LOT
HELP NEEDED FOR BATHING: A LOT
DAILY ACTIVITIY SCORE: 15
TOILETING: A LOT
MOVING TO AND FROM BED TO CHAIR: TOTAL
TURNING FROM BACK TO SIDE WHILE IN FLAT BAD: A LOT
MOBILITY SCORE: 8
DAILY ACTIVITIY SCORE: 15
STANDING UP FROM CHAIR USING ARMS: TOTAL
TURNING FROM BACK TO SIDE WHILE IN FLAT BAD: A LOT
MOVING TO AND FROM BED TO CHAIR: TOTAL
CLIMB 3 TO 5 STEPS WITH RAILING: TOTAL
CLIMB 3 TO 5 STEPS WITH RAILING: TOTAL
MOBILITY SCORE: 8
WALKING IN HOSPITAL ROOM: TOTAL
STANDING UP FROM CHAIR USING ARMS: A LOT
DRESSING REGULAR UPPER BODY CLOTHING: A LOT
MOVING TO AND FROM BED TO CHAIR: TOTAL
DRESSING REGULAR UPPER BODY CLOTHING: A LOT
MOVING TO AND FROM BED TO CHAIR: TOTAL
STANDING UP FROM CHAIR USING ARMS: TOTAL

## 2024-07-03 ASSESSMENT — PAIN SCALES - GENERAL
PAINLEVEL_OUTOF10: 0 - NO PAIN

## 2024-07-03 ASSESSMENT — PATIENT HEALTH QUESTIONNAIRE - PHQ9
2. FEELING DOWN, DEPRESSED OR HOPELESS: NOT AT ALL
SUM OF ALL RESPONSES TO PHQ9 QUESTIONS 1 & 2: 0
1. LITTLE INTEREST OR PLEASURE IN DOING THINGS: NOT AT ALL

## 2024-07-03 ASSESSMENT — LIFESTYLE VARIABLES
SKIP TO QUESTIONS 9-10: 1
HOW OFTEN DO YOU HAVE 6 OR MORE DRINKS ON ONE OCCASION: NEVER
AUDIT-C TOTAL SCORE: 0
HOW MANY STANDARD DRINKS CONTAINING ALCOHOL DO YOU HAVE ON A TYPICAL DAY: PATIENT DOES NOT DRINK
HOW OFTEN DO YOU HAVE A DRINK CONTAINING ALCOHOL: NEVER
AUDIT-C TOTAL SCORE: 0

## 2024-07-03 ASSESSMENT — PAIN - FUNCTIONAL ASSESSMENT
PAIN_FUNCTIONAL_ASSESSMENT: 0-10

## 2024-07-03 NOTE — PROGRESS NOTES
Jordon Waddell is a 73 y.o. male on day 7 of admission presenting with Intracranial hemorrhage (Multi).    Subjective   No acute events overnight.      Objective   Heart Rate:  [68-93]   Temp:  [35.8 °C (96.4 °F)-37 °C (98.6 °F)]   Resp:  [16-20]   BP: (124-158)/(64-76)   SpO2:  [93 %-95 %]       Physical Exam  Neurological Exam  CV : RRR, Chest: CTAB, GI : soft, non tender, non distended  Mental status: Oriented x4, converses normally with intact language domains. Following simple and complex commands.  CN: Pupils equal and reactive, VF intact, EOM intact, L upper < lower face droop, no dysarthria.  Motor: Rt side 5/5, LUE flicker finger movements, LLE AG with mild drift, some knee flexion, dorsi/plantar flexion 5/5.  Sensory: intact in all extremities with no neglect, however on fine sensory testing there is subtle sensory deficits in the LUE/LLE  Coordination intact FTN In RUE  Gait Deferred.    Legs: not swollen with with no tenderness in the calf, mild tenderness behind the heel.    Relevant Results    NIH Stroke Scale  1A. Level of Consciousness: Alert, Keenly Responsive  1B. Ask Month and Age: Both Questions Right  1C. Blink Eyes & Squeeze Hands: Performs Both Tasks  2. Best Gaze: Normal  3. Visual: No Visual Loss  4. Facial Palsy: Partial Paralysis  5A. Motor - Left Arm: No Movement  5B. Motor - Right Arm: No Drift  6A. Motor - Left Leg: Drift  6B. Motor - Right Leg: No Drift  7. Limb Ataxia: Absent  8. Sensory Loss: Normal  9. Best Language: No Aphasia  10. Dysarthria: Normal  11. Extinction and Inattention: No Abnormality  NIH Stroke Scale: 7       Assessment/Plan      Principal Problem:    Intracranial hemorrhage (Multi)  Active Problems:    Leg pain  Jordon Waddell is a 73 y.o. right handed male with a PMHx of HTN, HLD, DM, CKD2, who presents with R thalamic ICH, slightly enlarged on first repeat CHT, stable on third repeat CTH. Initial /86. Likely hypertensive in etiology. Admitted for further  management. TTE EF 50-55%, no PFO, LA not well sen. Passed MBSS for modified diet. PT/OT recommend IAR. Course complicated by fever of unknown origin(negative infectious work up), completed 48hx of Abx treament. Due to that and pain in the LLE leg, DVT US done and showed left soleal vein LDVT, given recent ICH, vascular medicine consulted and recommended monitoring with twice weekly dvt US(next one will be on 7/5/2024) to monitor probagation of clot, per their instruction he should continue on the chemoDVT ppx while in the rehab and continue having SCDs in both legs at all times (unless while ambulating).    From a stroke perspective, if the DVT demonstrate propagation needing AC anticoagulation, we would be ok with him starting on a therapeutic anticoagulation for that given that benefit are likely to out weight the risks of re bleed.     Things to follow up:  - Bi weekly DVT U/S, next Is 7/5/2024, c/w chemo dvt ppx nd SCDs at all times.  - Will need stroke ,PCP follow up  - For BP: increased home amlodipine to 10mg. Resumed lisinopril20mg-hydrochlorothiazide 12.5mg BID,  Might require further medication adjustments while in rehab or with PCP.    #R thalamic ICH  #HTN, HLD  - Tele  - SBP goal 130-150  - Hold home ASA  - c/w lisinopril ot 20mg BID, Amlodipine 5mg.   - c/w hydrochlorothiazide 12.5mg BID.  - c/w Rosuvastatin 40mg  - SLP failed x2 -> MBSS -> passed for soft-bite sized and mild thick liquids.      #Elevated temp  :: Max 38.8 6/29,   :: Asmyptomatic  - Hold tylenol if possible  - No leukocytosis. Bcx, UA, CXR unrearkable  - sp jennifer/vanc for 48h then stopped.  [x] DVT US BLE - New acute left soleal DVT  - Consult vascular medicine for recommendations given ICH: recommend  : Bi weekly DVT U/S, next Is 7/5/2024, c/w chemo dvt ppx nd SCDs at all times.  - From stroke perspective, ok to start AC if DVT probagates and warrants Anticoagulation.      #Heel pain:    - Ankle XR, no fracture, possible calcaneal  spur  - Lidocaine patch    #DM2  - Hold home meds  - ISS and hypoglycemia protocol    #CKD2 - stable Cr    #Dispo: IAR per PT/OT.    DVT ppx: SCDs, SQH Start 6/28.  Code: Full code      Praful Hodge MD

## 2024-07-03 NOTE — CARE PLAN
The patient's goals for the shift include      The clinical goals for the shift include patient will remain fall and injury free throughout the shift      Problem: General Stroke  Goal: Establish a mutual long term goal with patient by discharge  Outcome: Progressing  Goal: Demonstrate improvement in neurological exam throughout the shift  Outcome: Progressing  Goal: Maintain BP within ordered limits throughout shift  Outcome: Progressing  Goal: Participate in treatment (ie., meds, therapy) throughout shift  Outcome: Progressing  Goal: No symptoms of aspiration throughout shift  Outcome: Progressing  Goal: No symptoms of hemorrhage throughout shift  Outcome: Progressing  Goal: Tolerate enteral feeding throughout shift  Outcome: Progressing  Goal: Decreased nausea/vomiting throughout shift  Outcome: Progressing  Goal: Controlled blood glucose throughout shift  Outcome: Progressing  Goal: Out of bed three times today  Outcome: Progressing     Problem: ICU Stroke  Goal: Maintain ICP within ordered limits throughout shift  Outcome: Progressing  Goal: Tolerate EVD clamping trial throughout shift  Outcome: Progressing  Goal: Tolerate ventilator weaning trial during shift  Outcome: Progressing  Goal: Maintain patent airway throughout shift  Outcome: Progressing  Goal: Achieve/maintain targeted sodium level throughout shift  Outcome: Progressing     Problem: Skin  Goal: Prevent/manage excess moisture  Outcome: Progressing

## 2024-07-03 NOTE — DISCHARGE INSTRUCTIONS
Derrick Waddell, you were admitted because you had a brain bleed caused by high blood pressure. As we discussed, we hope that as the blood resorps with time, you will be able to get back function of your left side. We made some adjustments to your blood pressure medications(increased your amlodipine to 10mg daily, and continued your lisinopril-hydrochlorothiazide as it is twice daily). You may need further adjustments in the future while in the rehab or when you see your PCP.    We found that you have a small clot in the left leg veins, usually we would treat it with a blood thinner but given that you had a hemorrhage we asked the vascular medicine team oidrewion and they recommended frequent monitoring of that with twice weekly ultrasound, which you should have them while in the rehab as well (next one should be on 7/09/20214). If the clot probagates higher in the leg then the risk for it going to the lung would be higher and at that time you might be started on a blood thinner given that the benefits would outweight the risks. We will write instructions on your discharge summary for the following physicians at the rehab.     You will still get PT/OT and speech/swallow evaluation while there.     You will need outpatient follow up with stroke, Primary care which we have requested, please call the phone numbers provided in this paperwork to schedule them. We have also requested follow up with vascular medicine for one month after your discharge for them to assess your leg clot progression.    Instructions from Vascular Medicine Regarding your Leg Clot for the Rehab Facility:  2 X A WEEK FOR APPROX 2 WEEKS SHOULD BE SUFFICIENT - THEN FOLLOW CLINICALLY FOR ANY CHANGE IN SX.   CONTINUE PROPHYLAXIS NOW AND WHEN HE IS DISCHARGED TO REHAB. UFH TID OR LOVENOX 40 MG DAILY.  SCD SHOULD BE APPLIED AT ALL TIMES    It was a pleasure taking care of you!

## 2024-07-03 NOTE — PROGRESS NOTES
FLORENTINW met with patient at bedside to provide his original copy of his completed FMLA forms. This LSW retained a copy in case the original is lost. FLORENTINW provided his contact information to patient and agency in case any questions needs answered from either party.

## 2024-07-03 NOTE — CARE PLAN
The patient's goals for the shift include      The clinical goals for the shift include Pt safety will be maintained

## 2024-07-03 NOTE — PROGRESS NOTES
Physical Therapy    Physical Therapy Treatment    Patient Name: Jordon Waddell  MRN: [id number]  Today's Date: 7/3/2024  Time Calculation  Start Time: 1000  Stop Time: 1023  Time Calculation (min): 23 min    Assessment/Plan   PT Assessment  PT Assessment Results: Decreased strength, Decreased endurance, Impaired balance, Decreased mobility  Rehab Prognosis: Good  Evaluation/Treatment Tolerance: Patient tolerated treatment well  Medical Staff Made Aware: Yes  Strengths: Attitude of self, Support of Caregivers  End of Session Communication: Bedside nurse  Assessment Comment: Remains appropriate for HIGH intensity PT upon hospital d/c.  End of Session Patient Position: Bed, 3 rail up, Alarm on     PT Plan  Treatment/Interventions: Bed mobility, Transfer training, Gait training, Stair training, Balance training, Neuromuscular re-education, Strengthening, Endurance training, Therapeutic exercise, Therapeutic activity, Home exercise program, Positioning, Postural re-education  PT Plan: Ongoing PT  PT Frequency: 5 times per week  PT Discharge Recommendations: High intensity level of continued care  PT Recommended Transfer Status: Assist x2  PT - OK to Discharge: Yes (When medically ready)      General Visit Information:   PT  Visit  PT Received On: 07/03/24  Response to Previous Treatment: Patient with no complaints from previous session.  General  Family/Caregiver Present: Yes  Caregiver Feedback: wife present  Patient Position Received: Bed, 3 rail up, Alarm on  Preferred Learning Style: verbal, visual  General Comment: L kendra, pleasant, cooperative    Subjective   Precautions:  Precautions  Medical Precautions: Fall precautions  Vital Signs:       Objective   Pain:  Pain Assessment  Pain Assessment: 0-10  0-10 (Numeric) Pain Score: 0 - No pain  Cognition:  Cognition  Orientation Level: Oriented X4  Coordination:     Postural Control:  Static Sitting Balance  Static Sitting-Balance Support: Feet supported (R UE supported on  bedrail and L UE supported with pillows)  Static Sitting-Level of Assistance: Minimum assistance, Moderate assistance  Static Sitting-Comment/Number of Minutes: Tolerated static sitting for 5-8min prior to attempting trans  Dynamic Sitting Balance  Dynamic Sitting-Balance Support: Left upper extremity supported (supported with pillows)  Dynamic Sitting-Balance: Reaching across midline (with R UE)  Dynamic Sitting-Comments: Max A x 1 due to posterior LOB  Static Standing Balance  Static Standing-Balance Support: Bilateral upper extremity supported  Static Standing-Level of Assistance: Maximum assistance (x2 with gait belt)  Static Standing-Comment/Number of Minutes: x2 trials for :20-:30 sec ea (Max cues for upright posture)    Treatments:       Bed Mobility  Bed Mobility: Yes  Bed Mobility 1  Bed Mobility 1: Supine to sitting, Sitting to supine  Level of Assistance 1: Maximum assistance, +2, Moderate verbal cues  Bed Mobility Comments 1: HOB elevated  Bed Mobility 2  Bed Mobility  2: Scooting  Level of Assistance 2: Maximum assistance, +2 (with use of salvador pad to scoot towards EOB)    Transfers  Transfer: Yes  Transfer 1  Transfer From 1: Sit to, Stand to  Transfer to 1: Stand, Sit  Technique 1: Sit to stand, Stand to sit  Transfer Device 1: Gait belt  Transfer Level of Assistance 1: Maximum assistance, +2, Moderate verbal cues  Trials/Comments 1: x2 trials    Outcome Measures:  [facility] Basic Mobility  Turning from your back to your side while in a flat bed without using bedrails: A lot  Moving from lying on your back to sitting on the side of a flat bed without using bedrails: A lot  Moving to and from bed to chair (including a wheelchair): Total  Standing up from a chair using your arms (e.g. wheelchair or bedside chair): A lot  To walk in hospital room: Total  Climbing 3-5 steps with railing: Total  Basic Mobility - Total Score: 9    Education Documentation  Handouts, taught by Monica Gray PTA at 7/3/2024  11:54 AM.  Learner: Patient  Readiness: Acceptance  Method: Explanation  Response: Verbalizes Understanding    Precautions, taught by Monica Gray PTA at 7/3/2024 11:54 AM.  Learner: Patient  Readiness: Acceptance  Method: Explanation  Response: Verbalizes Understanding    Body Mechanics, taught by Monica Gray PTA at 7/3/2024 11:54 AM.  Learner: Patient  Readiness: Acceptance  Method: Explanation  Response: Verbalizes Understanding    Home Exercise Program, taught by Monica Gray PTA at 7/3/2024 11:54 AM.  Learner: Patient  Readiness: Acceptance  Method: Explanation  Response: Verbalizes Understanding    Mobility Training, taught by Monica Gray PTA at 7/3/2024 11:54 AM.  Learner: Patient  Readiness: Acceptance  Method: Explanation  Response: Verbalizes Understanding    Education Comments  No comments found.        OP EDUCATION:       Encounter Problems       Encounter Problems (Active)       PT Problem       Patient will perform bed mobility with </= CGA to reduce risk of developing decubitus ulcers.  (Progressing)       Start:  06/27/24    Expected End:  07/11/24            Patient will perform sit to stand and stand to sit transfers with </= MIN A x1 and LRD to increase functional strength.  (Progressing)       Start:  06/27/24    Expected End:  07/11/24            Patient will ambulate at least 30  ft. with </= MIN A x1 and LRD to improve tolerance of community distances.    (Progressing)       Start:  06/27/24    Expected End:  07/11/24            Patient will perform home exercise program as prescribed with cues as needed.   (Progressing)       Start:  06/27/24    Expected End:  07/11/24            LLE >/= 4+/5 throughout (Progressing)       Start:  06/27/24    Expected End:  07/11/24

## 2024-07-03 NOTE — HOSPITAL COURSE
Jordon Waddell is a 73 y.o. right handed male with a PMHx of HTN, HLD, DM, CKD2, who presents with R thalamic ICH, slightly enlarged on first repeat CHT, stable on third repeat CTH. Initial /86. Likely hypertensive in etiology. Admitted for further management. TTE EF 50-55%, no PFO, LA not well seen. Passed MBSS for modified diet. PT/OT recommend IAR. Course complicated by fever of unknown origin(negative infectious work up), completed 48hx of Abx treament. Due to that and pain in the LLE leg, DVT US done and showed left soleal vein LDVT, given recent ICH, vascular medicine consulted and recommended monitoring with twice weekly dvt US for two weeks then monitor clinically (repeat US DVT 7/5 showed new left gastrocnemius vein DVT, discussed with vascular medicine Dr. Wilson and plan is to continue monitoring with bi weekly US). Per their instruction he should continue on the chemoDVT ppx while in the rehab and continue having SCDs in both legs at all times (unless while ambulating).    From a stroke perspective, if the DVT demonstrate propagation needing AC anticoagulation, we would be ok with him starting on a therapeutic anticoagulation for that given that benefit are likely to out weight the risks of re bleed.     Repeat DVT scan on 7/09 showed re demonstration of non occlusive left soleal thrombus. Patient was discharged to rehab on 7/09.

## 2024-07-04 LAB
BASOPHILS # BLD AUTO: 0.04 X10*3/UL (ref 0–0.1)
BASOPHILS NFR BLD AUTO: 0.4 %
EOSINOPHIL # BLD AUTO: 0.27 X10*3/UL (ref 0–0.4)
EOSINOPHIL NFR BLD AUTO: 2.9 %
ERYTHROCYTE [DISTWIDTH] IN BLOOD BY AUTOMATED COUNT: 13 % (ref 11.5–14.5)
GLUCOSE BLD MANUAL STRIP-MCNC: 188 MG/DL (ref 74–99)
GLUCOSE BLD MANUAL STRIP-MCNC: 217 MG/DL (ref 74–99)
GLUCOSE BLD MANUAL STRIP-MCNC: 234 MG/DL (ref 74–99)
GLUCOSE BLD MANUAL STRIP-MCNC: 265 MG/DL (ref 74–99)
GLUCOSE BLD MANUAL STRIP-MCNC: 315 MG/DL (ref 74–99)
GLUCOSE BLD MANUAL STRIP-MCNC: 363 MG/DL (ref 74–99)
HCT VFR BLD AUTO: 44 % (ref 41–52)
HGB BLD-MCNC: 14.6 G/DL (ref 13.5–17.5)
IMM GRANULOCYTES # BLD AUTO: 0.04 X10*3/UL (ref 0–0.5)
IMM GRANULOCYTES NFR BLD AUTO: 0.4 % (ref 0–0.9)
LYMPHOCYTES # BLD AUTO: 1.46 X10*3/UL (ref 0.8–3)
LYMPHOCYTES NFR BLD AUTO: 15.9 %
MCH RBC QN AUTO: 29.7 PG (ref 26–34)
MCHC RBC AUTO-ENTMCNC: 33.2 G/DL (ref 32–36)
MCV RBC AUTO: 89 FL (ref 80–100)
MONOCYTES # BLD AUTO: 1.02 X10*3/UL (ref 0.05–0.8)
MONOCYTES NFR BLD AUTO: 11.1 %
NEUTROPHILS # BLD AUTO: 6.33 X10*3/UL (ref 1.6–5.5)
NEUTROPHILS NFR BLD AUTO: 69.3 %
NRBC BLD-RTO: 0 /100 WBCS (ref 0–0)
PLATELET # BLD AUTO: 173 X10*3/UL (ref 150–450)
RBC # BLD AUTO: 4.92 X10*6/UL (ref 4.5–5.9)
WBC # BLD AUTO: 9.2 X10*3/UL (ref 4.4–11.3)

## 2024-07-04 PROCEDURE — 2500000004 HC RX 250 GENERAL PHARMACY W/ HCPCS (ALT 636 FOR OP/ED): Performed by: STUDENT IN AN ORGANIZED HEALTH CARE EDUCATION/TRAINING PROGRAM

## 2024-07-04 PROCEDURE — 2500000004 HC RX 250 GENERAL PHARMACY W/ HCPCS (ALT 636 FOR OP/ED)

## 2024-07-04 PROCEDURE — 2500000001 HC RX 250 WO HCPCS SELF ADMINISTERED DRUGS (ALT 637 FOR MEDICARE OP): Performed by: STUDENT IN AN ORGANIZED HEALTH CARE EDUCATION/TRAINING PROGRAM

## 2024-07-04 PROCEDURE — 36415 COLL VENOUS BLD VENIPUNCTURE: CPT | Performed by: STUDENT IN AN ORGANIZED HEALTH CARE EDUCATION/TRAINING PROGRAM

## 2024-07-04 PROCEDURE — 99232 SBSQ HOSP IP/OBS MODERATE 35: CPT

## 2024-07-04 PROCEDURE — 82947 ASSAY GLUCOSE BLOOD QUANT: CPT

## 2024-07-04 PROCEDURE — 2500000002 HC RX 250 W HCPCS SELF ADMINISTERED DRUGS (ALT 637 FOR MEDICARE OP, ALT 636 FOR OP/ED): Performed by: STUDENT IN AN ORGANIZED HEALTH CARE EDUCATION/TRAINING PROGRAM

## 2024-07-04 PROCEDURE — 85025 COMPLETE CBC W/AUTO DIFF WBC: CPT | Performed by: STUDENT IN AN ORGANIZED HEALTH CARE EDUCATION/TRAINING PROGRAM

## 2024-07-04 PROCEDURE — 2500000001 HC RX 250 WO HCPCS SELF ADMINISTERED DRUGS (ALT 637 FOR MEDICARE OP)

## 2024-07-04 PROCEDURE — 2500000002 HC RX 250 W HCPCS SELF ADMINISTERED DRUGS (ALT 637 FOR MEDICARE OP, ALT 636 FOR OP/ED)

## 2024-07-04 PROCEDURE — 2500000005 HC RX 250 GENERAL PHARMACY W/O HCPCS

## 2024-07-04 PROCEDURE — 1100000001 HC PRIVATE ROOM DAILY

## 2024-07-04 ASSESSMENT — COGNITIVE AND FUNCTIONAL STATUS - GENERAL
PERSONAL GROOMING: A LITTLE
MOBILITY SCORE: 12
DRESSING REGULAR LOWER BODY CLOTHING: A LOT
DAILY ACTIVITIY SCORE: 14
WALKING IN HOSPITAL ROOM: A LOT
MOVING FROM LYING ON BACK TO SITTING ON SIDE OF FLAT BED WITH BEDRAILS: A LITTLE
STANDING UP FROM CHAIR USING ARMS: A LOT
TURNING FROM BACK TO SIDE WHILE IN FLAT BAD: A LOT
WALKING IN HOSPITAL ROOM: A LOT
STANDING UP FROM CHAIR USING ARMS: A LOT
CLIMB 3 TO 5 STEPS WITH RAILING: TOTAL
DRESSING REGULAR UPPER BODY CLOTHING: A LOT
HELP NEEDED FOR BATHING: A LOT
TURNING FROM BACK TO SIDE WHILE IN FLAT BAD: A LOT
TOILETING: A LOT
MOBILITY SCORE: 12
DRESSING REGULAR UPPER BODY CLOTHING: A LOT
MOVING FROM LYING ON BACK TO SITTING ON SIDE OF FLAT BED WITH BEDRAILS: A LOT
MOVING TO AND FROM BED TO CHAIR: A LOT
CLIMB 3 TO 5 STEPS WITH RAILING: A LOT
HELP NEEDED FOR BATHING: A LOT
TOILETING: A LOT
EATING MEALS: A LITTLE
DRESSING REGULAR LOWER BODY CLOTHING: A LOT
MOVING TO AND FROM BED TO CHAIR: A LOT
PERSONAL GROOMING: A LOT

## 2024-07-04 ASSESSMENT — PAIN SCALES - GENERAL
PAINLEVEL_OUTOF10: 0 - NO PAIN
PAINLEVEL_OUTOF10: 0 - NO PAIN

## 2024-07-04 ASSESSMENT — PAIN SCALES - WONG BAKER: WONGBAKER_NUMERICALRESPONSE: NO HURT

## 2024-07-04 NOTE — CARE PLAN
The patient's goals for the shift include      The clinical goals for the shift include Pt will remain free from falls and injuries this shift    Over the shift, pt remained free from falls and injuries. Pt slept throughout the night with no complaints. Safety measures implemented, call light within reach and bed at lowest position

## 2024-07-04 NOTE — PROGRESS NOTES
"Jordon Waddell is a 73 y.o. male on day 8 of admission presenting with Intracranial hemorrhage (Multi).    Subjective   NAEON    Objective     Last Recorded Vitals  Blood pressure 124/76, pulse 74, temperature 36.7 °C (98.1 °F), resp. rate 18, height 1.702 m (5' 7\"), weight 100 kg (221 lb), SpO2 96%.    Physical Exam  Psychiatric:         Speech: Speech normal.       Neurological Exam  Mental Status  Awake, alert and oriented to person, place and time. Speech is normal. Language is fluent with no aphasia.    Cranial Nerves  CN VII:  Left: There is central facial weakness.    Motor   Left hemiplegia.            Relevant Results    NIH Stroke Scale  1A. Level of Consciousness: Alert, Keenly Responsive  1B. Ask Month and Age: Both Questions Right  1C. Blink Eyes & Squeeze Hands: Performs Both Tasks  2. Best Gaze: Normal  3. Visual: No Visual Loss  4. Facial Palsy: Partial Paralysis  5A. Motor - Left Arm: No Movement  5B. Motor - Right Arm: No Drift  6A. Motor - Left Leg: Drift  6B. Motor - Right Leg: No Drift  7. Limb Ataxia: Absent  8. Sensory Loss: Normal  9. Best Language: No Aphasia  10. Dysarthria: Normal  11. Extinction and Inattention: No Abnormality  NIH Stroke Scale: 7           Orma Coma Scale  Best Eye Response: Spontaneous  Best Verbal Response: Oriented  Best Motor Response: Follows commands  Ammon Coma Scale Score: 15                                  Assessment/Plan      Principal Problem:    Intracranial hemorrhage (Multi)  Active Problems:    Leg pain      Jordon Waddell is a 73 y.o. right handed male with a PMHx of HTN, HLD, DM, CKD2, who presents with R thalamic ICH, slightly enlarged on first repeat CHT, stable on third repeat CTH. Initial /86. Likely hypertensive in etiology. Admitted for further management. TTE EF 50-55%, no PFO, LA not well sen. Passed MBSS for modified diet. PT/OT recommend IAR. Course complicated by fever of unknown origin(negative infectious work up), completed 48hx " of Abx treament. Due to that and pain in the LLE leg, DVT US done and showed left soleal vein LDVT, given recent ICH, vascular medicine consulted and recommended monitoring with twice weekly dvt US(next one will be on 7/5/2024) to monitor probagation of clot, per their instruction he should continue on the chemoDVT ppx while in the rehab and continue having SCDs in both legs at all times (unless while ambulating).     From a stroke perspective, if the DVT demonstrate propagation needing AC anticoagulation, we would be ok with him starting on a therapeutic anticoagulation for that given that benefit are likely to out weight the risks of re bleed.             #R thalamic ICH likely hypertensive  #HTN, HLD  - Tele  - SBP goal normotension  - Hold home ASA; Will clarify indication and determine restart plan   - c/w lisinopril ot 20mg BID, Amlodipine 5mg.   - c/w hydrochlorothiazide 12.5mg BID.  - c/w Rosuvastatin 40mg  - SLP failed x2 -> MBSS -> passed for soft-bite sized and mild thick liquids.        #Elevated temp  :: Max 38.8 6/29,   :: Asmyptomatic  - Hold tylenol if possible  - No leukocytosis. Bcx, UA, CXR unrearkable  - sp jennifer/vanc for 48h then stopped.  [x] DVT US BLE - New acute left soleal DVT  - Consult vascular medicine for recommendations given ICH: recommend  : Bi weekly DVT U/S, next Is 7/5/2024 (ordered), c/w chemo dvt ppx nd SCDs at all times.  - From stroke perspective, ok to start AC if DVT probagates and warrants Anticoagulation.        #Heel pain:    - Ankle XR, no fracture, possible calcaneal spur  - Lidocaine patch  - resolved      #DM2  - Hold home meds  - ISS and hypoglycemia protocol     #CKD2 - stable Cr     #Dispo: IAR per PT/OT pending acceptance.   Medically ready for discharge      DVT ppx: SCDs, SQH Start 6/28.  Code: Full code    Jamir David MD

## 2024-07-04 NOTE — CARE PLAN
The patient's goals for the shift include  remain fall free    The clinical goals for the shift include pt will remain free from injury      Problem: General Stroke  Goal: Establish a mutual long term goal with patient by discharge  Outcome: Progressing  Goal: Demonstrate improvement in neurological exam throughout the shift  Outcome: Progressing  Goal: Maintain BP within ordered limits throughout shift  Outcome: Progressing  Goal: Participate in treatment (ie., meds, therapy) throughout shift  Outcome: Progressing  Goal: No symptoms of aspiration throughout shift  Outcome: Progressing  Goal: No symptoms of hemorrhage throughout shift  Outcome: Progressing  Goal: Tolerate enteral feeding throughout shift  Outcome: Progressing  Goal: Decreased nausea/vomiting throughout shift  Outcome: Progressing  Goal: Controlled blood glucose throughout shift  Outcome: Progressing  Goal: Out of bed three times today  Outcome: Progressing     Problem: ICU Stroke  Goal: Maintain ICP within ordered limits throughout shift  Outcome: Progressing  Goal: Tolerate EVD clamping trial throughout shift  Outcome: Progressing  Goal: Tolerate ventilator weaning trial during shift  Outcome: Progressing  Goal: Maintain patent airway throughout shift  Outcome: Progressing  Goal: Achieve/maintain targeted sodium level throughout shift  Outcome: Progressing     Problem: Skin  Goal: Prevent/manage excess moisture  Outcome: Progressing

## 2024-07-05 ENCOUNTER — APPOINTMENT (OUTPATIENT)
Dept: VASCULAR MEDICINE | Facility: HOSPITAL | Age: 74
DRG: 064 | End: 2024-07-05
Payer: MEDICARE

## 2024-07-05 LAB
ATRIAL RATE: 90 BPM
BASOPHILS # BLD AUTO: 0.04 X10*3/UL (ref 0–0.1)
BASOPHILS NFR BLD AUTO: 0.5 %
BODY SURFACE AREA: 2.18 M2
EOSINOPHIL # BLD AUTO: 0.19 X10*3/UL (ref 0–0.4)
EOSINOPHIL NFR BLD AUTO: 2.5 %
ERYTHROCYTE [DISTWIDTH] IN BLOOD BY AUTOMATED COUNT: 12.9 % (ref 11.5–14.5)
GLUCOSE BLD MANUAL STRIP-MCNC: 164 MG/DL (ref 74–99)
GLUCOSE BLD MANUAL STRIP-MCNC: 189 MG/DL (ref 74–99)
GLUCOSE BLD MANUAL STRIP-MCNC: 207 MG/DL (ref 74–99)
GLUCOSE BLD MANUAL STRIP-MCNC: 222 MG/DL (ref 74–99)
GLUCOSE BLD MANUAL STRIP-MCNC: 227 MG/DL (ref 74–99)
GLUCOSE BLD MANUAL STRIP-MCNC: 247 MG/DL (ref 74–99)
HCT VFR BLD AUTO: 43.7 % (ref 41–52)
HGB BLD-MCNC: 14.9 G/DL (ref 13.5–17.5)
IMM GRANULOCYTES # BLD AUTO: 0.02 X10*3/UL (ref 0–0.5)
IMM GRANULOCYTES NFR BLD AUTO: 0.3 % (ref 0–0.9)
LYMPHOCYTES # BLD AUTO: 1.14 X10*3/UL (ref 0.8–3)
LYMPHOCYTES NFR BLD AUTO: 14.7 %
MCH RBC QN AUTO: 30.2 PG (ref 26–34)
MCHC RBC AUTO-ENTMCNC: 34.1 G/DL (ref 32–36)
MCV RBC AUTO: 89 FL (ref 80–100)
MONOCYTES # BLD AUTO: 0.89 X10*3/UL (ref 0.05–0.8)
MONOCYTES NFR BLD AUTO: 11.5 %
NEUTROPHILS # BLD AUTO: 5.46 X10*3/UL (ref 1.6–5.5)
NEUTROPHILS NFR BLD AUTO: 70.5 %
NRBC BLD-RTO: 0 /100 WBCS (ref 0–0)
P AXIS: 33 DEGREES
P OFFSET: 192 MS
P ONSET: 141 MS
PLATELET # BLD AUTO: 175 X10*3/UL (ref 150–450)
PR INTERVAL: 160 MS
Q ONSET: 221 MS
QRS COUNT: 15 BEATS
QRS DURATION: 70 MS
QT INTERVAL: 372 MS
QTC CALCULATION(BAZETT): 455 MS
QTC FREDERICIA: 425 MS
R AXIS: 17 DEGREES
RBC # BLD AUTO: 4.94 X10*6/UL (ref 4.5–5.9)
T AXIS: 80 DEGREES
T OFFSET: 407 MS
VENTRICULAR RATE: 90 BPM
WBC # BLD AUTO: 7.7 X10*3/UL (ref 4.4–11.3)

## 2024-07-05 PROCEDURE — 2500000002 HC RX 250 W HCPCS SELF ADMINISTERED DRUGS (ALT 637 FOR MEDICARE OP, ALT 636 FOR OP/ED): Performed by: STUDENT IN AN ORGANIZED HEALTH CARE EDUCATION/TRAINING PROGRAM

## 2024-07-05 PROCEDURE — 2500000001 HC RX 250 WO HCPCS SELF ADMINISTERED DRUGS (ALT 637 FOR MEDICARE OP)

## 2024-07-05 PROCEDURE — 2500000004 HC RX 250 GENERAL PHARMACY W/ HCPCS (ALT 636 FOR OP/ED)

## 2024-07-05 PROCEDURE — 1100000001 HC PRIVATE ROOM DAILY

## 2024-07-05 PROCEDURE — 2500000001 HC RX 250 WO HCPCS SELF ADMINISTERED DRUGS (ALT 637 FOR MEDICARE OP): Performed by: STUDENT IN AN ORGANIZED HEALTH CARE EDUCATION/TRAINING PROGRAM

## 2024-07-05 PROCEDURE — 85025 COMPLETE CBC W/AUTO DIFF WBC: CPT | Performed by: STUDENT IN AN ORGANIZED HEALTH CARE EDUCATION/TRAINING PROGRAM

## 2024-07-05 PROCEDURE — 2500000002 HC RX 250 W HCPCS SELF ADMINISTERED DRUGS (ALT 637 FOR MEDICARE OP, ALT 636 FOR OP/ED)

## 2024-07-05 PROCEDURE — 82947 ASSAY GLUCOSE BLOOD QUANT: CPT

## 2024-07-05 PROCEDURE — 97110 THERAPEUTIC EXERCISES: CPT | Mod: GP | Performed by: PHYSICAL THERAPIST

## 2024-07-05 PROCEDURE — 93970 EXTREMITY STUDY: CPT

## 2024-07-05 PROCEDURE — 36415 COLL VENOUS BLD VENIPUNCTURE: CPT | Performed by: STUDENT IN AN ORGANIZED HEALTH CARE EDUCATION/TRAINING PROGRAM

## 2024-07-05 PROCEDURE — 93970 EXTREMITY STUDY: CPT | Performed by: INTERNAL MEDICINE

## 2024-07-05 PROCEDURE — 99233 SBSQ HOSP IP/OBS HIGH 50: CPT

## 2024-07-05 PROCEDURE — 2500000005 HC RX 250 GENERAL PHARMACY W/O HCPCS

## 2024-07-05 PROCEDURE — 97110 THERAPEUTIC EXERCISES: CPT | Mod: GO

## 2024-07-05 PROCEDURE — 97530 THERAPEUTIC ACTIVITIES: CPT | Mod: GP | Performed by: PHYSICAL THERAPIST

## 2024-07-05 PROCEDURE — 97535 SELF CARE MNGMENT TRAINING: CPT | Mod: GO

## 2024-07-05 RX ORDER — INSULIN LISPRO 100 [IU]/ML
0-5 INJECTION, SOLUTION INTRAVENOUS; SUBCUTANEOUS
Status: DISCONTINUED | OUTPATIENT
Start: 2024-07-06 | End: 2024-07-08

## 2024-07-05 ASSESSMENT — COGNITIVE AND FUNCTIONAL STATUS - GENERAL
TOILETING: A LOT
EATING MEALS: A LITTLE
MOVING FROM LYING ON BACK TO SITTING ON SIDE OF FLAT BED WITH BEDRAILS: A LOT
STANDING UP FROM CHAIR USING ARMS: TOTAL
DAILY ACTIVITIY SCORE: 14
EATING MEALS: A LITTLE
TURNING FROM BACK TO SIDE WHILE IN FLAT BAD: A LOT
WALKING IN HOSPITAL ROOM: TOTAL
MOBILITY SCORE: 12
PERSONAL GROOMING: A LITTLE
STANDING UP FROM CHAIR USING ARMS: A LOT
MOVING FROM LYING ON BACK TO SITTING ON SIDE OF FLAT BED WITH BEDRAILS: A LOT
PERSONAL GROOMING: A LITTLE
MOVING TO AND FROM BED TO CHAIR: A LOT
DRESSING REGULAR UPPER BODY CLOTHING: A LOT
DRESSING REGULAR LOWER BODY CLOTHING: A LOT
DAILY ACTIVITIY SCORE: 14
WALKING IN HOSPITAL ROOM: A LOT
CLIMB 3 TO 5 STEPS WITH RAILING: A LOT
DRESSING REGULAR UPPER BODY CLOTHING: A LOT
MOBILITY SCORE: 9
MOVING TO AND FROM BED TO CHAIR: A LOT
TOILETING: A LOT
DRESSING REGULAR LOWER BODY CLOTHING: A LOT
HELP NEEDED FOR BATHING: A LOT
HELP NEEDED FOR BATHING: A LOT
TURNING FROM BACK TO SIDE WHILE IN FLAT BAD: A LOT
CLIMB 3 TO 5 STEPS WITH RAILING: TOTAL

## 2024-07-05 ASSESSMENT — PAIN SCALES - GENERAL
PAINLEVEL_OUTOF10: 0 - NO PAIN
PAINLEVEL_OUTOF10: 0 - NO PAIN

## 2024-07-05 ASSESSMENT — ACTIVITIES OF DAILY LIVING (ADL): HOME_MANAGEMENT_TIME_ENTRY: 14

## 2024-07-05 ASSESSMENT — PAIN - FUNCTIONAL ASSESSMENT
PAIN_FUNCTIONAL_ASSESSMENT: 0-10
PAIN_FUNCTIONAL_ASSESSMENT: 0-10

## 2024-07-05 NOTE — PROGRESS NOTES
Occupational Therapy                 Therapy Communication Note    Patient Name: Jordon Waddell  MRN: [id number]  Today's Date: 7/5/2024     Discipline: Occupational Therapy    Missed Visit Reason: Missed Visit Reason: Patient in a medical procedure    Missed Time: Attempt    Comment:

## 2024-07-05 NOTE — PROGRESS NOTES
Jordon Waddell is a 73 y.o. male on day 9 of admission presenting with Intracranial hemorrhage (Multi).    Subjective   No acute events overnight.      Objective   Heart Rate:  [62-74]   Temp:  [36.4 °C (97.5 °F)-36.7 °C (98.1 °F)]   Resp:  [18]   BP: (108-124)/(67-86)   SpO2:  [93 %-96 %]       Physical Exam  Neurological Exam  CV : RRR, Chest: CTAB, GI : soft, non tender, non distended  Mental status: Oriented x4, converses normally with intact language domains. Following simple and complex commands.  CN: Pupils equal and reactive, VF intact, EOM intact, L upper < lower face droop, no dysarthria.  Motor: Rt side 5/5, LUE flicker finger movements, LLE AG with mild drift, some knee flexion, dorsi/plantar flexion 5/5.  Sensory: intact in all extremities with no neglect, however on fine sensory testing there is subtle sensory deficits in the LUE/LLE  Coordination intact FTN In RUE  Gait Deferred.    Legs: not swollen with with no tenderness in the calf, mild tenderness behind the heel.    Relevant Results    NIH Stroke Scale  1A. Level of Consciousness: Alert, Keenly Responsive  1B. Ask Month and Age: Both Questions Right  1C. Blink Eyes & Squeeze Hands: Performs Both Tasks  2. Best Gaze: Normal  3. Visual: No Visual Loss  4. Facial Palsy: Partial Paralysis  5A. Motor - Left Arm: No Movement  5B. Motor - Right Arm: No Drift  6A. Motor - Left Leg: Drift  6B. Motor - Right Leg: No Drift  7. Limb Ataxia: Absent  8. Sensory Loss: Normal  9. Best Language: No Aphasia  10. Dysarthria: Normal  11. Extinction and Inattention: No Abnormality  NIH Stroke Scale: 7       Assessment/Plan      Principal Problem:    Intracranial hemorrhage (Multi)  Active Problems:    Leg pain  Jordon Waddell is a 73 y.o. right handed male with a PMHx of HTN, HLD, DM, CKD2, who presents with R thalamic ICH, slightly enlarged on first repeat CHT, stable on third repeat CTH. Initial /86. Likely hypertensive in etiology. Admitted for further  management. TTE EF 50-55%, no PFO, LA not well sen. Passed MBSS for modified diet. PT/OT recommend IAR. Course complicated by fever of unknown origin(negative infectious work up), completed 48hx of Abx treament. Due to that and pain in the LLE leg, DVT US done and showed left soleal vein LDVT, given recent ICH, vascular medicine consulted and recommended monitoring with twice weekly dvt US(next one will be on 7/5/2024) to monitor probagation of clot, per their instruction he should continue on the chemoDVT ppx while in the rehab and continue having SCDs in both legs at all times (unless while ambulating).    From a stroke perspective, if the DVT demonstrate propagation needing AC anticoagulation, we would be ok with him starting on a therapeutic anticoagulation for that given that benefit are likely to out weight the risks of re bleed.     Updates:  - Medically ready for dc  - Repeat DVT US today  - pending precert    #R thalamic ICH  #HTN, HLD  - Tele  - SBP goal 130-150  - Hold home ASA  - c/w lisinopril ot 20mg BID, Amlodipine 5mg.   - c/w hydrochlorothiazide 12.5mg BID.  - c/w Rosuvastatin 40mg  - SLP failed x2 -> MBSS -> passed for soft-bite sized and mild thick liquids.      #Elevated temp  :: Max 38.8 6/29,   :: Asmyptomatic  - Hold tylenol if possible  - No leukocytosis. Bcx, UA, CXR unrearkable  - sp jennifer/vanc for 48h then stopped.  [x] DVT US BLE - New acute left soleal DVT  - Consult vascular medicine for recommendations given ICH: recommend  : Bi weekly DVT U/S, next Is 7/5/2024, c/w chemo dvt ppx nd SCDs at all times.  - From stroke perspective, ok to start AC if DVT probagates and warrants Anticoagulation.      #Heel pain:    - Ankle XR, no fracture, possible calcaneal spur  - Lidocaine patch    #DM2  :: A1c 6.2  - Hold home meds  - ISS and hypoglycemia protocol    #CKD2 - stable Cr    #Dispo: IAR per PT/OT.    DVT ppx: SCDs, SQH  Code: Full code      Praful Hodge MD

## 2024-07-05 NOTE — PROGRESS NOTES
Physical Therapy    Physical Therapy Treatment    Patient Name: Jordon Waddell  MRN: [id number]  Today's Date: 7/5/2024  Time Calculation  Start Time: 1115  Stop Time: 1138  Time Calculation (min): 23 min    Assessment/Plan   PT Assessment  PT Assessment Results: Decreased strength, Decreased endurance, Impaired balance, Decreased mobility  Rehab Prognosis: Good  Evaluation/Treatment Tolerance: Patient tolerated treatment well  Strengths: Attitude of self  Barriers to Participation: Comorbidities  End of Session Communication: Bedside nurse  Assessment Comment: Remains appropriate for HIGH intensity PT upon hospital d/c.  End of Session Patient Position: Bed, 3 rail up, Alarm on  PT Plan  Inpatient/Swing Bed or Outpatient: Inpatient  PT Plan  Treatment/Interventions: Bed mobility, Transfer training, Gait training, Stair training, Endurance training, Range of motion, Therapeutic exercise, Therapeutic activity, Home exercise program, Orthotic fitting/training, Strengthening, Neuromuscular re-education  PT Plan: Ongoing PT  PT Frequency: 5 times per week  PT Discharge Recommendations: High intensity level of continued care  PT Recommended Transfer Status: Assist x2  PT - OK to Discharge: Yes      General Visit Information:   PT  Visit  PT Received On: 07/05/24  General  Reason for Referral: Pt p/w sudden onset left sided weakness and L sided FD after fall w/o LOC. Found to have R BG ICH ~1.4 ml at 0900 6/26/24. Expanded to 2.9 ml at 1230.  No acute neurosurgical intervention. Likely hypertensive etiology. [facility] 6/26 2141 stable. GCS 15, NIHSS 7.  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 Hearing WFL  Medical Precautions: Fall precautions  Precautions Comment: -150  Vital Signs:       Objective   Pain:  Pain Assessment  Pain Assessment: 0-10  0-10 (Numeric) Pain Score: 0 - No pain  Cognition:     Coordination:     Postural Control:  Postural Control  Posture Comment: tends to push to L but attempting to correct when cued.  Static Sitting Balance  Static Sitting-Balance Support: Feet supported  Static Sitting-Level of Assistance:  (min to max assist on the L)  Dynamic Sitting Balance  Dynamic Sitting-Comments: min to mod assist while working on finding neutral posture  Static Standing Balance  Static Standing-Level of Assistance: Maximum assistance (x2)  Static Standing-Comment/Number of Minutes: x 2 trials with 1.5 mins and 45 seconds. (max cues for blocking at knee and glute use)    Activity Tolerance:  Activity Tolerance  Endurance: Endurance does not limit participation in activity  Early Mobility/Exercise Safety Screen: Proceed with mobilization - No exclusion criteria met  Treatments:  Therapeutic Exercise  Therapeutic Exercise Performed: Yes  Therapeutic Exercise Activity 1: PROM of L LE in all planes 1 x 10 each    Therapeutic Activity  Therapeutic Activity Performed: Yes  Therapeutic Activity 1: sitting EOB working on finding neutral with weight shifts to the R and trying to overcorrect.  Therapeutic Activity 2: standing working on weight bearing and knee extension on L LE    Bed Mobility  Bed Mobility: Yes  Bed Mobility 1  Bed Mobility 1: Supine to sitting, Sitting to supine  Level of Assistance 1: Maximum assistance, +2, Moderate verbal cues  Bed Mobility 2  Bed Mobility  2: Scooting  Level of Assistance 2: Maximum assistance, +2  Bed Mobility Comments 2: scooting towards HOB    Ambulation/Gait  Training  Ambulation/Gait Training Performed: No  Transfers  Transfer: Yes  Transfer 1  Transfer From 1: Sit to, Stand to  Transfer to 1: Stand, Sit  Technique 1: Sit to stand, Stand to sit  Transfer Device 1:  (arm in arm)  Transfer Level of Assistance 1: Maximum assistance, +2, Moderate verbal cues  Trials/Comments 1: X@         Outcome Measures:  [facility] Basic Mobility  Turning from your back to your side while in a flat bed without using bedrails: A lot  Moving from lying on your back to sitting on the side of a flat bed without using bedrails: A lot  Moving to and from bed to chair (including a wheelchair): A lot  Standing up from a chair using your arms (e.g. wheelchair or bedside chair): A lot  To walk in hospital room: A lot  Climbing 3-5 steps with railing: A lot  Basic Mobility - Total Score: 12    Education Documentation  Handouts, taught by Sanjuanita Adkins PT at 7/5/2024 12:12 PM.  Learner: Patient  Readiness: Eager  Method: Explanation  Response: Verbalizes Understanding    Precautions, taught by Sanjuanita Adkins PT at 7/5/2024 12:12 PM.  Learner: Patient  Readiness: Eager  Method: Explanation  Response: Verbalizes Understanding    Body Mechanics, taught by Sanjuanita Adkins PT at 7/5/2024 12:12 PM.  Learner: Patient  Readiness: Eager  Method: Explanation  Response: Verbalizes Understanding    Home Exercise Program, taught by Sanjuanita Adkins PT at 7/5/2024 12:12 PM.  Learner: Patient  Readiness: Eager  Method: Explanation  Response: Verbalizes Understanding    Mobility Training, taught by Sanjuanita Adkins PT at 7/5/2024 12:12 PM.  Learner: Patient  Readiness: Eager  Method: Explanation  Response: Verbalizes Understanding    Education Comments  No comments found.        OP EDUCATION:       Encounter Problems       Encounter Problems (Active)       PT Problem       Patient will perform bed mobility with </= CGA to reduce risk of developing decubitus ulcers.  (Progressing)        Start:  06/27/24    Expected End:  07/11/24            Patient will perform sit to stand and stand to sit transfers with </= MIN A x1 and LRD to increase functional strength.  (Progressing)       Start:  06/27/24    Expected End:  07/11/24            Patient will ambulate at least 30  ft. with </= MIN A x1 and LRD to improve tolerance of community distances.    (Progressing)       Start:  06/27/24    Expected End:  07/11/24            Patient will perform home exercise program as prescribed with cues as needed.   (Progressing)       Start:  06/27/24    Expected End:  07/11/24            LLE >/= 4+/5 throughout (Progressing)       Start:  06/27/24    Expected End:  07/11/24

## 2024-07-05 NOTE — CARE PLAN
The patient's goals for the shift include  remain free from falls    The clinical goals for the shift include Patient will remain safe throughout shift.      Problem: General Stroke  Goal: Establish a mutual long term goal with patient by discharge  Outcome: Progressing  Goal: Demonstrate improvement in neurological exam throughout the shift  Outcome: Progressing  Goal: Maintain BP within ordered limits throughout shift  Outcome: Progressing  Goal: Participate in treatment (ie., meds, therapy) throughout shift  Outcome: Progressing  Goal: No symptoms of aspiration throughout shift  Outcome: Progressing  Goal: No symptoms of hemorrhage throughout shift  Outcome: Progressing  Goal: Tolerate enteral feeding throughout shift  Outcome: Progressing  Goal: Decreased nausea/vomiting throughout shift  Outcome: Progressing  Goal: Controlled blood glucose throughout shift  Outcome: Progressing  Goal: Out of bed three times today  Outcome: Progressing     Problem: ICU Stroke  Goal: Maintain ICP within ordered limits throughout shift  Outcome: Progressing  Goal: Tolerate EVD clamping trial throughout shift  Outcome: Progressing  Goal: Tolerate ventilator weaning trial during shift  Outcome: Progressing  Goal: Maintain patent airway throughout shift  Outcome: Progressing  Goal: Achieve/maintain targeted sodium level throughout shift  Outcome: Progressing     Problem: Skin  Goal: Prevent/manage excess moisture  Outcome: Progressing

## 2024-07-05 NOTE — PROGRESS NOTES
Occupational Therapy    Occupational Therapy Treatment    Name: Jordon Waddell  MRN: [id number]  : 1950  Date: 24  Time Calculation  Start Time: 1114  Stop Time: 1138  Time Calculation (min): 24 min    Assessment:  OT Assessment: Pt is a 73 year old male who demonstrates decreased strength, balance, activity tolerance, and coordination, which impedes occupational performance.  Prognosis: Good  Barriers to Discharge: Inaccessible home environment  Evaluation/Treatment Tolerance: Patient tolerated treatment well  Medical Staff Made Aware: Yes  End of Session Communication: Bedside nurse  End of Session Patient Position: Bed, 3 rail up, Alarm on  Plan:  Treatment Interventions: ADL retraining, Functional transfer training, UE strengthening/ROM, Endurance training, Cognitive reorientation, Patient/family training, Equipment evaluation/education, Neuromuscular reeducation, Fine motor coordination activities, Compensatory technique education, UE splinting  OT Frequency: 4 times per week  OT Discharge Recommendations: High intensity level of continued care  Equipment Recommended upon Discharge:  (TBD)  OT Recommended Transfer Status: Assist of 2  OT - OK to Discharge: Yes    Subjective   Previous Visit Info:  OT Last Visit  OT Received On: 24  General:  General  Reason for Referral: Pt p/w sudden onset left sided weakness and L sided FD after fall w/o LOC. Found to have R BG ICH ~1.4 ml at 0900 24. Expanded to 2.9 ml at 1230.  No acute neurosurgical intervention. Likely hypertensive etiology. [facility] 2141 stable. GCS 15, NIHSS 7.  Past Medical History Relevant to Rehab: HTN, HLD, DM, CKD2, L total shoulder replacement 2024, BETH several years ago  Family/Caregiver Present: Yes  Caregiver Feedback: wife present  Co-Treatment: PT  Co-Treatment Reason: maximize pt safety  Prior to Session Communication: Bedside nurse  Patient Position Received: Bed, 3 rail up, Alarm off, not on at start of session,  Alarm off, caregiver present  General Comment: L kendra, pleasant, cooperative  Precautions:  Medical Precautions: Fall precautions       Pain Assessment:  Pain Assessment  Pain Assessment: 0-10  0-10 (Numeric) Pain Score: 0 - No pain     Objective   Cognition:  Overall Cognitive Status: Within Functional Limits  Orientation Level: Oriented X4  Insight: Within function limits  Impulsive: Within functional limits  Processing Speed: Within funtional limits  Activities of Daily Living:      UE Dressing  UE Dressing Level of Assistance:  (max A stefani/doff sheet as sling LUE supine, max A adjust gown seated EOB and standing)    LE Dressing  LE Dressing:  (max A stefani B socks supine, max A doff/stefani RLE waffle boot)  Functional Standing Tolerance:  Functional Standing Tolerance  Functional Standing Tolerance Comments: 2x stand max A x2 B knee blocking  Bed Mobility/Transfers: Bed Mobility 1  Level of Assistance 1:  (sup/sit max A x2, boost in bed max A x2)    Transfer 1  Transfer Level of Assistance 1:  (sit/stand 2x max A x2 HHA B knee block)      Sitting Balance:  Dynamic Sitting Balance  Dynamic Sitting-Balance:  (seated EOB ~10 min L lateral lean max A to mod A with RUE support on bed rail and knee mod VC's and tactile cues posture correction)     Therapy/Activity: Therapeutic Exercise  Therapeutic Exercise Performed:  (supine PROM LUE 1X10 reps shoulder flex/ext, elbow flex/ext, wrist flex/ext, open/close grasp with passive stretch digits. LUE positioned in neutral on pillow)      Outcome Measures:  [facility] Daily Activity  Putting on and taking off regular lower body clothing: A lot  Bathing (including washing, rinsing, drying): A lot  Putting on and taking off regular upper body clothing: A lot  Toileting, which includes using toilet, bedpan or urinal: A lot  Taking care of personal grooming such as brushing teeth: A little  Eating Meals: A little  Daily Activity - Total Score: 14        Education Documentation  Handouts,  taught by Sienna Salgado OT at 7/5/2024 11:56 AM.  Learner: Significant Other, Patient  Readiness: Acceptance  Method: Explanation, Demonstration  Response: Verbalizes Understanding, Demonstrated Understanding    Body Mechanics, taught by Sienna Salgado OT at 7/5/2024 11:56 AM.  Learner: Significant Other, Patient  Readiness: Acceptance  Method: Explanation, Demonstration  Response: Verbalizes Understanding, Demonstrated Understanding    Precautions, taught by Sienna Salgado OT at 7/5/2024 11:56 AM.  Learner: Significant Other, Patient  Readiness: Acceptance  Method: Explanation, Demonstration  Response: Verbalizes Understanding, Demonstrated Understanding    Home Exercise Program, taught by Sienna Salgado OT at 7/5/2024 11:56 AM.  Learner: Significant Other, Patient  Readiness: Acceptance  Method: Explanation, Demonstration  Response: Verbalizes Understanding, Demonstrated Understanding    ADL Training, taught by Sienna Salgado OT at 7/5/2024 11:56 AM.  Learner: Significant Other, Patient  Readiness: Acceptance  Method: Explanation, Demonstration  Response: Verbalizes Understanding, Demonstrated Understanding    Education Comments  No comments found.      Goals:  Encounter Problems       Encounter Problems (Active)       ADLs       Patient with complete upper body dressing with stand by assist level of assistance donning and doffing all UE clothes with PRN adaptive equipment and compensatory technique  (Progressing)       Start:  06/27/24    Expected End:  07/11/24            Patient with complete lower body dressing with minimal assist  level of assistance donning and doffing all LE clothes  with PRN adaptive equipment (Progressing)       Start:  06/27/24    Expected End:  07/11/24            Patient will feed self with modified independent level of assistance and using PRN adaptive equipment and compensatory technique (Progressing)       Start:  06/27/24    Expected End:  07/11/24             Patient will complete daily grooming tasks with modified independent level of assistance and PRN adaptive equipment and compensatory technique. (Progressing)       Start:  06/27/24    Expected End:  07/11/24               EXERCISE/STRENGTHENING       Patient will be educated on LUE HEP for increased ADL performance. (Progressing)       Start:  06/27/24    Expected End:  07/11/24            Pt will increase LUE strength for optimal participation in ADLs/IADLs and functional mobility.  (Progressing)       Start:  06/27/24    Expected End:  07/11/24               TRANSFERS       Patient will complete functional transfer to chair with least restrictive device with minimal assist  level of assistance. (Progressing)       Start:  06/27/24    Expected End:  07/11/24

## 2024-07-05 NOTE — PROGRESS NOTES
Speech-Language Pathology                 Therapy Communication Note    Patient Name: Jordon Waddell  MRN: [id number]  Today's Date: 7/5/2024     Discipline: Speech Language Pathology    Pt off unit at Ultrasound; not available for Speech Therapy.  Will continue to follow.        07/05/24 at 1:23 PM - FELTON Horner

## 2024-07-06 LAB
ALBUMIN SERPL BCP-MCNC: 4.1 G/DL (ref 3.4–5)
ANION GAP SERPL CALC-SCNC: 14 MMOL/L (ref 10–20)
BASOPHILS # BLD AUTO: 0.04 X10*3/UL (ref 0–0.1)
BASOPHILS NFR BLD AUTO: 0.5 %
BUN SERPL-MCNC: 42 MG/DL (ref 6–23)
CALCIUM SERPL-MCNC: 11.1 MG/DL (ref 8.6–10.6)
CHLORIDE SERPL-SCNC: 98 MMOL/L (ref 98–107)
CO2 SERPL-SCNC: 29 MMOL/L (ref 21–32)
CREAT SERPL-MCNC: 1.18 MG/DL (ref 0.5–1.3)
EGFRCR SERPLBLD CKD-EPI 2021: 66 ML/MIN/1.73M*2
EOSINOPHIL # BLD AUTO: 0.22 X10*3/UL (ref 0–0.4)
EOSINOPHIL NFR BLD AUTO: 2.7 %
ERYTHROCYTE [DISTWIDTH] IN BLOOD BY AUTOMATED COUNT: 13 % (ref 11.5–14.5)
GLUCOSE BLD MANUAL STRIP-MCNC: 201 MG/DL (ref 74–99)
GLUCOSE BLD MANUAL STRIP-MCNC: 218 MG/DL (ref 74–99)
GLUCOSE BLD MANUAL STRIP-MCNC: 256 MG/DL (ref 74–99)
GLUCOSE BLD MANUAL STRIP-MCNC: 264 MG/DL (ref 74–99)
GLUCOSE BLD MANUAL STRIP-MCNC: 277 MG/DL (ref 74–99)
GLUCOSE SERPL-MCNC: 206 MG/DL (ref 74–99)
HCT VFR BLD AUTO: 45.7 % (ref 41–52)
HGB BLD-MCNC: 15.2 G/DL (ref 13.5–17.5)
IMM GRANULOCYTES # BLD AUTO: 0.05 X10*3/UL (ref 0–0.5)
IMM GRANULOCYTES NFR BLD AUTO: 0.6 % (ref 0–0.9)
LYMPHOCYTES # BLD AUTO: 1.4 X10*3/UL (ref 0.8–3)
LYMPHOCYTES NFR BLD AUTO: 16.9 %
MAGNESIUM SERPL-MCNC: 2.05 MG/DL (ref 1.6–2.4)
MCH RBC QN AUTO: 29.4 PG (ref 26–34)
MCHC RBC AUTO-ENTMCNC: 33.3 G/DL (ref 32–36)
MCV RBC AUTO: 88 FL (ref 80–100)
MONOCYTES # BLD AUTO: 1.12 X10*3/UL (ref 0.05–0.8)
MONOCYTES NFR BLD AUTO: 13.5 %
NEUTROPHILS # BLD AUTO: 5.44 X10*3/UL (ref 1.6–5.5)
NEUTROPHILS NFR BLD AUTO: 65.8 %
NRBC BLD-RTO: 0 /100 WBCS (ref 0–0)
PHOSPHATE SERPL-MCNC: 3.6 MG/DL (ref 2.5–4.9)
PLATELET # BLD AUTO: 116 X10*3/UL (ref 150–450)
POTASSIUM SERPL-SCNC: 3.9 MMOL/L (ref 3.5–5.3)
RBC # BLD AUTO: 5.17 X10*6/UL (ref 4.5–5.9)
SODIUM SERPL-SCNC: 137 MMOL/L (ref 136–145)
WBC # BLD AUTO: 8.3 X10*3/UL (ref 4.4–11.3)

## 2024-07-06 PROCEDURE — 2500000001 HC RX 250 WO HCPCS SELF ADMINISTERED DRUGS (ALT 637 FOR MEDICARE OP): Performed by: STUDENT IN AN ORGANIZED HEALTH CARE EDUCATION/TRAINING PROGRAM

## 2024-07-06 PROCEDURE — 80069 RENAL FUNCTION PANEL: CPT

## 2024-07-06 PROCEDURE — 1100000001 HC PRIVATE ROOM DAILY

## 2024-07-06 PROCEDURE — 2500000004 HC RX 250 GENERAL PHARMACY W/ HCPCS (ALT 636 FOR OP/ED)

## 2024-07-06 PROCEDURE — 2500000002 HC RX 250 W HCPCS SELF ADMINISTERED DRUGS (ALT 637 FOR MEDICARE OP, ALT 636 FOR OP/ED): Performed by: STUDENT IN AN ORGANIZED HEALTH CARE EDUCATION/TRAINING PROGRAM

## 2024-07-06 PROCEDURE — 2500000002 HC RX 250 W HCPCS SELF ADMINISTERED DRUGS (ALT 637 FOR MEDICARE OP, ALT 636 FOR OP/ED)

## 2024-07-06 PROCEDURE — 82947 ASSAY GLUCOSE BLOOD QUANT: CPT

## 2024-07-06 PROCEDURE — 2500000001 HC RX 250 WO HCPCS SELF ADMINISTERED DRUGS (ALT 637 FOR MEDICARE OP)

## 2024-07-06 PROCEDURE — 85025 COMPLETE CBC W/AUTO DIFF WBC: CPT | Performed by: STUDENT IN AN ORGANIZED HEALTH CARE EDUCATION/TRAINING PROGRAM

## 2024-07-06 PROCEDURE — 2500000004 HC RX 250 GENERAL PHARMACY W/ HCPCS (ALT 636 FOR OP/ED): Performed by: STUDENT IN AN ORGANIZED HEALTH CARE EDUCATION/TRAINING PROGRAM

## 2024-07-06 PROCEDURE — 36415 COLL VENOUS BLD VENIPUNCTURE: CPT | Performed by: STUDENT IN AN ORGANIZED HEALTH CARE EDUCATION/TRAINING PROGRAM

## 2024-07-06 PROCEDURE — 83735 ASSAY OF MAGNESIUM: CPT

## 2024-07-06 PROCEDURE — 99232 SBSQ HOSP IP/OBS MODERATE 35: CPT

## 2024-07-06 PROCEDURE — 2500000005 HC RX 250 GENERAL PHARMACY W/O HCPCS

## 2024-07-06 ASSESSMENT — PAIN - FUNCTIONAL ASSESSMENT: PAIN_FUNCTIONAL_ASSESSMENT: 0-10

## 2024-07-06 ASSESSMENT — PAIN SCALES - GENERAL: PAINLEVEL_OUTOF10: 0 - NO PAIN

## 2024-07-06 NOTE — PROGRESS NOTES
Jordon Waddell is a 73 y.o. male on day 10 of admission presenting with Intracranial hemorrhage (Multi).    Subjective   No acute events overnight.      Objective   Heart Rate:  [62-85]   Temp:  [35.7 °C (96.3 °F)-36.4 °C (97.5 °F)]   Resp:  [16-18]   BP: (108-136)/(65-80)   Weight:  [101 kg (222 lb)]   SpO2:  [92 %-95 %]       Physical Exam  Neurological Exam  CV : RRR, Chest: CTAB, GI : soft, non tender, non distended  Mental status: Oriented x4, converses normally with intact language domains. Following simple and complex commands.  CN: Pupils equal and reactive, VF intact, EOM intact, L upper < lower face droop, no dysarthria.  Motor: Rt side 5/5, LUE flicker finger movements, LLE AG with mild drift, some knee flexion, dorsi/plantar flexion 5/5.  Sensory: intact in all extremities with no neglect, however on fine sensory testing there is subtle sensory deficits in the LUE/LLE  Coordination intact FTN In RUE  Gait Deferred.    Legs: not swollen with with no tenderness in the calf, mild tenderness behind the heel.    Relevant Results    NIH Stroke Scale  1A. Level of Consciousness: Alert, Keenly Responsive  1B. Ask Month and Age: Both Questions Right  1C. Blink Eyes & Squeeze Hands: Performs Both Tasks  2. Best Gaze: Normal  3. Visual: No Visual Loss  4. Facial Palsy: Partial Paralysis  5A. Motor - Left Arm: No Movement  5B. Motor - Right Arm: No Drift  6A. Motor - Left Leg: Drift  6B. Motor - Right Leg: No Drift  7. Limb Ataxia: Absent  8. Sensory Loss: Normal  9. Best Language: No Aphasia  10. Dysarthria: Normal  11. Extinction and Inattention: No Abnormality  NIH Stroke Scale: 7       Assessment/Plan      Principal Problem:    Intracranial hemorrhage (Multi)  Active Problems:    Leg pain  Jordon Waddell is a 73 y.o. right handed male with a PMHx of HTN, HLD, DM, CKD2, who presents with R thalamic ICH, slightly enlarged on first repeat CHT, stable on third repeat CTH. Initial /86. Likely hypertensive in  etiology. Admitted for further management. TTE EF 50-55%, no PFO, LA not well sen. Passed MBSS for modified diet. PT/OT recommend IAR. Course complicated by fever of unknown origin(negative infectious work up), completed 48hx of Abx treament. Due to that and pain in the LLE leg, DVT US done and showed left soleal vein LDVT, given recent ICH, vascular medicine consulted and recommended monitoring with twice weekly dvt US(next one will be on 7/5/2024) to monitor probagation of clot, per their instruction he should continue on the chemoDVT ppx while in the rehab and continue having SCDs in both legs at all times (unless while ambulating).    From a stroke perspective, if the DVT demonstrate propagation needing AC anticoagulation, we would be ok with him starting on a therapeutic anticoagulation for that given that benefit are likely to out weight the risks of re bleed.     Updates:  - Medically ready for dc, pending precert  - Repeat DVT US 7/5 showed new left gastrocnemius DVT, discussed with vacular medicine who recommended continued monitoring with biweekly DVT scans.      #R thalamic ICH  #HTN, HLD  - Tele  - SBP goal 130-150  - Hold home ASA  - c/w lisinopril ot 20mg BID, Amlodipine 5mg.   - c/w hydrochlorothiazide 12.5mg BID.  - c/w Rosuvastatin 40mg  - SLP failed x2 -> MBSS -> passed for soft-bite sized and mild thick liquids.      #Elevated temp  :: Max 38.8 6/29,   :: Asmyptomatic  - Hold tylenol if possible  - No leukocytosis. Bcx, UA, CXR unrearkable  - sp jennifer/vanc for 48h then stopped.  [x] DVT US BLE - New acute left soleal DVT  - Consult vascular medicine for recommendations given ICH: recommend  : Bi weekly DVT U/S for two weeks, c/w chemo dvt ppx nd SCDs at all times.  - Repeat DVT US 7/5 showed new left gastrocnemius DVT, discussed with vacular medicine who recommended continued monitoring with biweekly DVT scans.  - From stroke perspective, ok to start AC if DVT probagates and warrants  Anticoagulation.      #Heel pain:    - Ankle XR, no fracture, possible calcaneal spur  - Lidocaine patch    #DM2  :: A1c 6.2  - Hold home meds  - ISS and hypoglycemia protocol    #CKD2 - stable Cr    #Dispo: IAR per PT/OT.    DVT ppx: SCDs, SQH  Code: Full code      Praful Hodge MD

## 2024-07-07 VITALS
DIASTOLIC BLOOD PRESSURE: 58 MMHG | OXYGEN SATURATION: 94 % | RESPIRATION RATE: 18 BRPM | TEMPERATURE: 96.4 F | SYSTOLIC BLOOD PRESSURE: 102 MMHG | WEIGHT: 222 LBS | HEIGHT: 67 IN | HEART RATE: 62 BPM | BODY MASS INDEX: 34.84 KG/M2

## 2024-07-07 LAB
BASOPHILS # BLD AUTO: 0.04 X10*3/UL (ref 0–0.1)
BASOPHILS NFR BLD AUTO: 0.5 %
EOSINOPHIL # BLD AUTO: 0.27 X10*3/UL (ref 0–0.4)
EOSINOPHIL NFR BLD AUTO: 3.6 %
ERYTHROCYTE [DISTWIDTH] IN BLOOD BY AUTOMATED COUNT: 13 % (ref 11.5–14.5)
GLUCOSE BLD MANUAL STRIP-MCNC: 201 MG/DL (ref 74–99)
GLUCOSE BLD MANUAL STRIP-MCNC: 210 MG/DL (ref 74–99)
GLUCOSE BLD MANUAL STRIP-MCNC: 228 MG/DL (ref 74–99)
GLUCOSE BLD MANUAL STRIP-MCNC: 237 MG/DL (ref 74–99)
HCT VFR BLD AUTO: 44.6 % (ref 41–52)
HGB BLD-MCNC: 14.8 G/DL (ref 13.5–17.5)
IMM GRANULOCYTES # BLD AUTO: 0.03 X10*3/UL (ref 0–0.5)
IMM GRANULOCYTES NFR BLD AUTO: 0.4 % (ref 0–0.9)
LYMPHOCYTES # BLD AUTO: 1.22 X10*3/UL (ref 0.8–3)
LYMPHOCYTES NFR BLD AUTO: 16.1 %
MCH RBC QN AUTO: 29.4 PG (ref 26–34)
MCHC RBC AUTO-ENTMCNC: 33.2 G/DL (ref 32–36)
MCV RBC AUTO: 89 FL (ref 80–100)
MONOCYTES # BLD AUTO: 0.97 X10*3/UL (ref 0.05–0.8)
MONOCYTES NFR BLD AUTO: 12.8 %
NEUTROPHILS # BLD AUTO: 5.05 X10*3/UL (ref 1.6–5.5)
NEUTROPHILS NFR BLD AUTO: 66.6 %
NRBC BLD-RTO: 0 /100 WBCS (ref 0–0)
PLATELET # BLD AUTO: 169 X10*3/UL (ref 150–450)
RBC # BLD AUTO: 5.04 X10*6/UL (ref 4.5–5.9)
WBC # BLD AUTO: 7.6 X10*3/UL (ref 4.4–11.3)

## 2024-07-07 PROCEDURE — 99232 SBSQ HOSP IP/OBS MODERATE 35: CPT

## 2024-07-07 PROCEDURE — 2500000002 HC RX 250 W HCPCS SELF ADMINISTERED DRUGS (ALT 637 FOR MEDICARE OP, ALT 636 FOR OP/ED): Performed by: STUDENT IN AN ORGANIZED HEALTH CARE EDUCATION/TRAINING PROGRAM

## 2024-07-07 PROCEDURE — 2500000001 HC RX 250 WO HCPCS SELF ADMINISTERED DRUGS (ALT 637 FOR MEDICARE OP): Performed by: STUDENT IN AN ORGANIZED HEALTH CARE EDUCATION/TRAINING PROGRAM

## 2024-07-07 PROCEDURE — 1100000001 HC PRIVATE ROOM DAILY

## 2024-07-07 PROCEDURE — 97112 NEUROMUSCULAR REEDUCATION: CPT | Mod: GP

## 2024-07-07 PROCEDURE — 2500000001 HC RX 250 WO HCPCS SELF ADMINISTERED DRUGS (ALT 637 FOR MEDICARE OP)

## 2024-07-07 PROCEDURE — 2500000004 HC RX 250 GENERAL PHARMACY W/ HCPCS (ALT 636 FOR OP/ED)

## 2024-07-07 PROCEDURE — 36415 COLL VENOUS BLD VENIPUNCTURE: CPT | Performed by: STUDENT IN AN ORGANIZED HEALTH CARE EDUCATION/TRAINING PROGRAM

## 2024-07-07 PROCEDURE — 97530 THERAPEUTIC ACTIVITIES: CPT | Mod: GP

## 2024-07-07 PROCEDURE — 82947 ASSAY GLUCOSE BLOOD QUANT: CPT

## 2024-07-07 PROCEDURE — 85025 COMPLETE CBC W/AUTO DIFF WBC: CPT | Performed by: STUDENT IN AN ORGANIZED HEALTH CARE EDUCATION/TRAINING PROGRAM

## 2024-07-07 PROCEDURE — 2500000004 HC RX 250 GENERAL PHARMACY W/ HCPCS (ALT 636 FOR OP/ED): Performed by: STUDENT IN AN ORGANIZED HEALTH CARE EDUCATION/TRAINING PROGRAM

## 2024-07-07 PROCEDURE — 2500000002 HC RX 250 W HCPCS SELF ADMINISTERED DRUGS (ALT 637 FOR MEDICARE OP, ALT 636 FOR OP/ED)

## 2024-07-07 ASSESSMENT — COGNITIVE AND FUNCTIONAL STATUS - GENERAL
TOILETING: TOTAL
DAILY ACTIVITIY SCORE: 9
MOBILITY SCORE: 10
CLIMB 3 TO 5 STEPS WITH RAILING: TOTAL
DRESSING REGULAR LOWER BODY CLOTHING: TOTAL
DRESSING REGULAR UPPER BODY CLOTHING: TOTAL
MOBILITY SCORE: 10
STANDING UP FROM CHAIR USING ARMS: TOTAL
EATING MEALS: A LITTLE
WALKING IN HOSPITAL ROOM: TOTAL
CLIMB 3 TO 5 STEPS WITH RAILING: TOTAL
STANDING UP FROM CHAIR USING ARMS: A LOT
TURNING FROM BACK TO SIDE WHILE IN FLAT BAD: A LITTLE
CLIMB 3 TO 5 STEPS WITH RAILING: TOTAL
TURNING FROM BACK TO SIDE WHILE IN FLAT BAD: A LOT
MOVING FROM LYING ON BACK TO SITTING ON SIDE OF FLAT BED WITH BEDRAILS: A LITTLE
MOVING TO AND FROM BED TO CHAIR: A LOT
STANDING UP FROM CHAIR USING ARMS: TOTAL
EATING MEALS: A LITTLE
HELP NEEDED FOR BATHING: TOTAL
WALKING IN HOSPITAL ROOM: TOTAL
DAILY ACTIVITIY SCORE: 9
TURNING FROM BACK TO SIDE WHILE IN FLAT BAD: A LOT
TOILETING: TOTAL
DRESSING REGULAR UPPER BODY CLOTHING: TOTAL
HELP NEEDED FOR BATHING: TOTAL
MOVING TO AND FROM BED TO CHAIR: TOTAL
MOVING TO AND FROM BED TO CHAIR: A LOT
WALKING IN HOSPITAL ROOM: TOTAL
MOVING FROM LYING ON BACK TO SITTING ON SIDE OF FLAT BED WITH BEDRAILS: A LITTLE
DRESSING REGULAR LOWER BODY CLOTHING: TOTAL
PERSONAL GROOMING: A LOT
PERSONAL GROOMING: A LOT
MOBILITY SCORE: 11
MOVING FROM LYING ON BACK TO SITTING ON SIDE OF FLAT BED WITH BEDRAILS: A LITTLE

## 2024-07-07 ASSESSMENT — PAIN SCALES - GENERAL
PAINLEVEL_OUTOF10: 0 - NO PAIN
PAINLEVEL_OUTOF10: 0 - NO PAIN

## 2024-07-07 ASSESSMENT — PAIN - FUNCTIONAL ASSESSMENT: PAIN_FUNCTIONAL_ASSESSMENT: 0-10

## 2024-07-07 NOTE — PROGRESS NOTES
Physical Therapy    Physical Therapy Treatment    Patient Name: Jordon Waddell  MRN: [id number]  Today's Date: 7/7/2024  Time Calculation  Start Time: 1210  Stop Time: 1248  Time Calculation (min): 38 min    Assessment/Plan   PT Assessment  End of Session Communication: Bedside nurse  Assessment Comment: Pt participated well in therapy with no inc in pain and no reports of fatigue however inc assist required for sitting and standing balance with time. Pt fluctuated between CGA and Min A static sitting balance requiring mod VCs to lean forward to correct posterior lean. Pt self corrected lateral lean with VCs and hand on bedrail. Pt was able to complete 3 trials of STS transfers requring maxA x2, gait belt, and RUE supported on bed rail. Pt continues to benefit from skilled PT and remains appropriate for high intensity PT post discharge.  End of Session Patient Position: Bed, 3 rail up, Alarm on  PT Plan  Inpatient/Swing Bed or Outpatient: Inpatient  PT Plan  Treatment/Interventions: Bed mobility, Transfer training, Gait training, Stair training, Endurance training, Range of motion, Therapeutic exercise, Therapeutic activity, Home exercise program, Orthotic fitting/training, Strengthening, Neuromuscular re-education  PT Plan: Ongoing PT  PT Frequency: 5 times per week  PT Discharge Recommendations: High intensity level of continued care  Equipment Recommended upon Discharge:  (TBD)  PT Recommended Transfer Status: Assist x2  PT - OK to Discharge: Yes (eval complete and discharge recommendations made)      General Visit Information:   PT  Visit  PT Received On: 07/07/24  Response to Previous Treatment: Patient with no complaints from previous session.  General  Reason for Referral: Pt p/w sudden onset left sided weakness and L sided FD after fall w/o LOC. Found to have R BG ICH ~1.4 ml at 0900 6/26/24. Expanded to 2.9 ml at 1230.  No acute neurosurgical intervention. Likely hypertensive etiology. [facility] 6/26 2141 stable.  GCS 15, NIHSS 7.  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, BETH several years ago  Prior to Session Communication: Bedside nurse  Patient Position Received: Bed, 3 rail up, Alarm off, not on at start of session  General Comment: Pt cleared for PT by RN. Alert and agreeable to therapy.    Subjective   Precautions:  Precautions  Medical Precautions: Fall precautions  Braces Applied: L arm sling donned  Precautions Comment: -150  Vital Signs:  Vital Signs  Heart Rate: 70  Heart Rate Source: Monitor  BP: 111/68  MAP (mmHg): 83  BP Location: Right arm  BP Method: Automatic  Patient Position: Lying    Objective   Pain:  Pain Assessment  Pain Assessment: 0-10  0-10 (Numeric) Pain Score: 0 - No pain  Pain Interventions: Repositioned, Rest  Response to Interventions: no change in pain  Cognition:  Cognition  Overall Cognitive Status: Within Functional Limits  Orientation Level: Oriented X4  Coordination:  Movements are Fluid and Coordinated: Yes (except LUE and LLE)  Postural Control:  Postural Control  Postural Control: Within Functional Limits  Trunk Control: posterior lean (able to self correct with min VCs)  Static Sitting Balance  Static Sitting-Balance Support: Feet supported, Right upper extremity supported  Static Sitting-Level of Assistance: Minimum assistance, Close supervision (min progressing to close supervision with VCs to lean forward to correct posterior lean)  Dynamic Sitting Balance  Dynamic Sitting-Balance Support: Right upper extremity supported, Feet supported  Dynamic Sitting-Balance: Forward lean  Dynamic Sitting-Comments: Brenda x1  Static Standing Balance  Static Standing-Balance Support: Right upper extremity supported (RUE supported on bed rail and gait belt used)  Static Standing-Level of Assistance: Maximum assistance (x2, Mod VCs to lean to Right)  Static Standing-Comment/Number of Minutes: x3 trials. Trial 1: 15 sec, trial 2: 30sec,  trial 3: 60sec  Activity Tolerance:  Activity Tolerance  Endurance: Endurance does not limit participation in activity  Treatments:  Therapeutic Activity  Therapeutic Activity Performed: Yes  Therapeutic Activity 1: bed moblity, transfers, pt edu, vital sign monitoring    Balance/Neuromuscular Re-Education  Balance/Neuromuscular Re-Education Activity Performed: Yes  Balance/Neuromuscular Re-Education Activity 1: Bilateral: APs, Right: QS and HS. Left: PROM hip/ knee flx. All x10.  Balance/Neuromuscular Re-Education Activity 2: sitting balance ~15 mins, standing balance    Bed Mobility  Bed Mobility: Yes  Bed Mobility 1  Bed Mobility 1: Supine to sitting  Level of Assistance 1: Maximum assistance, +2 (Assist at trunk and LLE)  Bed Mobility Comments 1: HOB elevated  Bed Mobility 2  Bed Mobility  2: Sitting to supine  Level of Assistance 2: Maximum assistance, +2 (assist at trunk and B LEs)  Bed Mobility Comments 2: HOB flat  Bed Mobility 3  Bed Mobility 3: Scooting  Level of Assistance 3: Dependent, +2  Bed Mobility Comments 3: to HOB using draw sheet    Ambulation/Gait Training  Ambulation/Gait Training Performed: No  Transfers  Transfer: Yes  Transfer 1  Transfer From 1: Sit to, Stand to  Transfer to 1: Stand, Sit  Technique 1: Sit to stand, Stand to sit  Transfer Device 1: Gait belt (RUE supported on bed rail)  Transfer Level of Assistance 1: Maximum assistance, +2  Trials/Comments 1: x3 trials throughout (max Ax2 for all trials)    Outcome Measures:  [facility] Basic Mobility  Turning from your back to your side while in a flat bed without using bedrails: A little  Moving from lying on your back to sitting on the side of a flat bed without using bedrails: A lot  Moving to and from bed to chair (including a wheelchair): A lot  Standing up from a chair using your arms (e.g. wheelchair or bedside chair): Total  To walk in hospital room: Total  Climbing 3-5 steps with railing: Total  Basic Mobility - Total Score:  10    Education Documentation  Body Mechanics, taught by LEONORA Johnson at 7/7/2024  1:05 PM.  Learner: Patient  Readiness: Acceptance  Method: Explanation  Response: Verbalizes Understanding    Home Exercise Program, taught by LEONORA Johnson at 7/7/2024  1:05 PM.  Learner: Patient  Readiness: Acceptance  Method: Explanation  Response: Verbalizes Understanding    Mobility Training, taught by LEONORA Johnson at 7/7/2024  1:05 PM.  Learner: Patient  Readiness: Acceptance  Method: Explanation  Response: Verbalizes Understanding    Education Comments  No comments found.      Encounter Problems       Encounter Problems (Active)       PT Problem       Patient will perform bed mobility with </= CGA to reduce risk of developing decubitus ulcers.  (Progressing)       Start:  06/27/24    Expected End:  07/11/24            Patient will perform sit to stand and stand to sit transfers with </= MIN A x1 and LRD to increase functional strength.  (Progressing)       Start:  06/27/24    Expected End:  07/11/24            Patient will ambulate at least 30  ft. with </= MIN A x1 and LRD to improve tolerance of community distances.    (Not Progressing)       Start:  06/27/24    Expected End:  07/11/24            Patient will perform home exercise program as prescribed with cues as needed.   (Progressing)       Start:  06/27/24    Expected End:  07/11/24            LLE >/= 4+/5 throughout (Progressing)       Start:  06/27/24    Expected End:  07/11/24

## 2024-07-07 NOTE — PROGRESS NOTES
Jordon Waddell is a 73 y.o. male on day 11 of admission presenting with Intracranial hemorrhage (Multi).    Subjective   No acute events overnight.      Objective   Heart Rate:  [62-75]   Temp:  [36 °C (96.8 °F)-36.9 °C (98.4 °F)]   Resp:  [16-18]   BP: (105-129)/(67-83)   SpO2:  [93 %-97 %]       Physical Exam  Neurological Exam  CV : RRR, Chest: CTAB, GI : soft, non tender, non distended  Mental status: Oriented x4, converses normally with intact language domains. Following simple and complex commands.  CN: Pupils equal and reactive, VF intact, EOM intact, L upper < lower face droop, no dysarthria.  Motor: Rt side 5/5, LUE flicker finger movements, LLE AG with mild drift, some knee flexion, dorsi/plantar flexion 5/5.  Sensory: intact in all extremities with no neglect, however on fine sensory testing there is subtle sensory deficits in the LUE/LLE  Coordination intact FTN In RUE  Gait Deferred.    Legs: not swollen with with no tenderness in the calf, mild tenderness behind the heel.    Relevant Results    NIH Stroke Scale  1A. Level of Consciousness: Alert, Keenly Responsive  1B. Ask Month and Age: Both Questions Right  1C. Blink Eyes & Squeeze Hands: Performs Both Tasks  2. Best Gaze: Normal  3. Visual: No Visual Loss  4. Facial Palsy: Partial Paralysis  5A. Motor - Left Arm: No Movement  5B. Motor - Right Arm: No Drift  6A. Motor - Left Leg: Drift  6B. Motor - Right Leg: No Drift  7. Limb Ataxia: Absent  8. Sensory Loss: Normal  9. Best Language: No Aphasia  10. Dysarthria: Normal  11. Extinction and Inattention: No Abnormality  NIH Stroke Scale: 7       Assessment/Plan      Principal Problem:    Intracranial hemorrhage (Multi)  Active Problems:    Leg pain  Jordon Waddell is a 73 y.o. right handed male with a PMHx of HTN, HLD, DM, CKD2, who presents with R thalamic ICH, slightly enlarged on first repeat CHT, stable on third repeat CTH. Initial /86. Likely hypertensive in etiology. Admitted for further  management. TTE EF 50-55%, no PFO, LA not well sen. Passed MBSS for modified diet. PT/OT recommend IAR. Course complicated by fever of unknown origin(negative infectious work up), completed 48hx of Abx treament. Due to that and pain in the LLE leg, DVT US done and showed left soleal vein LDVT, given recent ICH, vascular medicine consulted and recommended monitoring with twice weekly dvt US for two weeks then monitor clinically (repeat US DVT 7/5 showed new left gastrocnemius vein DVT, discussed with vascualr medicine Dr. Wilson and plan is to continue monitoring with bi weekly US). Per their instruction he should continue on the chemoDVT ppx while in the rehab and continue having SCDs in both legs at all times (unless while ambulating).    From a stroke perspective, if the DVT demonstrate propagation needing AC anticoagulation, we would be ok with him starting on a therapeutic anticoagulation for that given that benefit are likely to out weight the risks of re bleed.     Updates:  - Medically ready for dc, pending precert  - next repeat DVT us ordered for 7/9/24 if pt is in house      #R thalamic ICH  #HTN, HLD  - Tele  - SBP goal 130-150  - Hold home ASA  - c/w lisinopril ot 20mg BID, Amlodipine 5mg.   - c/w hydrochlorothiazide 12.5mg BID.  - c/w Rosuvastatin 40mg  - SLP failed x2 -> MBSS -> passed for soft-bite sized and mild thick liquids.  - Hold Home ASA I/s/o bleeding      #Elevated temp  :: Max 38.8 6/29,   :: Asmyptomatic  - Hold tylenol if possible  - No leukocytosis. Bcx, UA, CXR unrearkable  - sp jennifer/vanc for 48h then stopped.  [x] DVT US BLE 7/2 - New acute left soleal DVT  - Consult vascular medicine for recommendations given ICH: recommend  : Bi weekly DVT U/S for two weeks, c/w chemo dvt ppx nd SCDs at all times.  - Repeat DVT US 7/5 showed new left gastrocnemius DVT, discussed with vacular medicine who recommended continued monitoring with biweekly DVT scans.  - From stroke perspective, ok to start  AC if DVT probagates and warrants Anticoagulation.  - Next US ordered 7/9/24      #Heel pain:    - Ankle XR, no fracture, possible calcaneal spur  - Lidocaine patch    #DM2  :: A1c 6.2  - Hold home meds  - ISS and hypoglycemia protocol    #CKD2 - stable Cr    #Dispo: IAR per PT/OT.    DVT ppx: SCDs, SQH  Code: Full code      Praful Hodge MD

## 2024-07-07 NOTE — CARE PLAN
The patient's goals for the shift include      The clinical goals for the shift include pt will have no s/s of aspiration this shift    Over the shift, the patient had no s/s of aspiration. Nursing care maintained throughout shift.

## 2024-07-08 LAB
BASOPHILS # BLD AUTO: 0.03 X10*3/UL (ref 0–0.1)
BASOPHILS NFR BLD AUTO: 0.5 %
EOSINOPHIL # BLD AUTO: 0.29 X10*3/UL (ref 0–0.4)
EOSINOPHIL NFR BLD AUTO: 4.8 %
ERYTHROCYTE [DISTWIDTH] IN BLOOD BY AUTOMATED COUNT: 13.1 % (ref 11.5–14.5)
GLUCOSE BLD MANUAL STRIP-MCNC: 206 MG/DL (ref 74–99)
GLUCOSE BLD MANUAL STRIP-MCNC: 223 MG/DL (ref 74–99)
GLUCOSE BLD MANUAL STRIP-MCNC: 261 MG/DL (ref 74–99)
HCT VFR BLD AUTO: 43.1 % (ref 41–52)
HGB BLD-MCNC: 14.6 G/DL (ref 13.5–17.5)
IMM GRANULOCYTES # BLD AUTO: 0.04 X10*3/UL (ref 0–0.5)
IMM GRANULOCYTES NFR BLD AUTO: 0.7 % (ref 0–0.9)
LYMPHOCYTES # BLD AUTO: 1.19 X10*3/UL (ref 0.8–3)
LYMPHOCYTES NFR BLD AUTO: 19.8 %
MCH RBC QN AUTO: 30.4 PG (ref 26–34)
MCHC RBC AUTO-ENTMCNC: 33.9 G/DL (ref 32–36)
MCV RBC AUTO: 90 FL (ref 80–100)
MONOCYTES # BLD AUTO: 0.79 X10*3/UL (ref 0.05–0.8)
MONOCYTES NFR BLD AUTO: 13.2 %
NEUTROPHILS # BLD AUTO: 3.66 X10*3/UL (ref 1.6–5.5)
NEUTROPHILS NFR BLD AUTO: 61 %
NRBC BLD-RTO: 0 /100 WBCS (ref 0–0)
PLATELET # BLD AUTO: 205 X10*3/UL (ref 150–450)
RBC # BLD AUTO: 4.81 X10*6/UL (ref 4.5–5.9)
WBC # BLD AUTO: 6 X10*3/UL (ref 4.4–11.3)

## 2024-07-08 PROCEDURE — 2500000001 HC RX 250 WO HCPCS SELF ADMINISTERED DRUGS (ALT 637 FOR MEDICARE OP): Performed by: STUDENT IN AN ORGANIZED HEALTH CARE EDUCATION/TRAINING PROGRAM

## 2024-07-08 PROCEDURE — 2500000002 HC RX 250 W HCPCS SELF ADMINISTERED DRUGS (ALT 637 FOR MEDICARE OP, ALT 636 FOR OP/ED)

## 2024-07-08 PROCEDURE — 2500000001 HC RX 250 WO HCPCS SELF ADMINISTERED DRUGS (ALT 637 FOR MEDICARE OP)

## 2024-07-08 PROCEDURE — 2500000005 HC RX 250 GENERAL PHARMACY W/O HCPCS

## 2024-07-08 PROCEDURE — 82947 ASSAY GLUCOSE BLOOD QUANT: CPT

## 2024-07-08 PROCEDURE — 99233 SBSQ HOSP IP/OBS HIGH 50: CPT

## 2024-07-08 PROCEDURE — 85025 COMPLETE CBC W/AUTO DIFF WBC: CPT | Performed by: STUDENT IN AN ORGANIZED HEALTH CARE EDUCATION/TRAINING PROGRAM

## 2024-07-08 PROCEDURE — 1100000001 HC PRIVATE ROOM DAILY

## 2024-07-08 PROCEDURE — 2500000004 HC RX 250 GENERAL PHARMACY W/ HCPCS (ALT 636 FOR OP/ED)

## 2024-07-08 PROCEDURE — 36415 COLL VENOUS BLD VENIPUNCTURE: CPT | Performed by: STUDENT IN AN ORGANIZED HEALTH CARE EDUCATION/TRAINING PROGRAM

## 2024-07-08 PROCEDURE — 2500000004 HC RX 250 GENERAL PHARMACY W/ HCPCS (ALT 636 FOR OP/ED): Performed by: STUDENT IN AN ORGANIZED HEALTH CARE EDUCATION/TRAINING PROGRAM

## 2024-07-08 PROCEDURE — 2500000002 HC RX 250 W HCPCS SELF ADMINISTERED DRUGS (ALT 637 FOR MEDICARE OP, ALT 636 FOR OP/ED): Performed by: STUDENT IN AN ORGANIZED HEALTH CARE EDUCATION/TRAINING PROGRAM

## 2024-07-08 RX ORDER — INSULIN LISPRO 100 [IU]/ML
0-10 INJECTION, SOLUTION INTRAVENOUS; SUBCUTANEOUS
Status: DISCONTINUED | OUTPATIENT
Start: 2024-07-08 | End: 2024-07-10 | Stop reason: HOSPADM

## 2024-07-08 ASSESSMENT — COGNITIVE AND FUNCTIONAL STATUS - GENERAL
DAILY ACTIVITIY SCORE: 10
PERSONAL GROOMING: A LOT
MOVING FROM LYING ON BACK TO SITTING ON SIDE OF FLAT BED WITH BEDRAILS: A LITTLE
DRESSING REGULAR LOWER BODY CLOTHING: TOTAL
DRESSING REGULAR UPPER BODY CLOTHING: TOTAL
MOBILITY SCORE: 10
HELP NEEDED FOR BATHING: TOTAL
CLIMB 3 TO 5 STEPS WITH RAILING: TOTAL
TOILETING: A LOT
WALKING IN HOSPITAL ROOM: TOTAL
MOVING TO AND FROM BED TO CHAIR: A LOT
STANDING UP FROM CHAIR USING ARMS: TOTAL
EATING MEALS: A LITTLE
TURNING FROM BACK TO SIDE WHILE IN FLAT BAD: A LOT

## 2024-07-08 ASSESSMENT — PAIN SCALES - GENERAL
PAINLEVEL_OUTOF10: 0 - NO PAIN

## 2024-07-08 ASSESSMENT — PAIN - FUNCTIONAL ASSESSMENT: PAIN_FUNCTIONAL_ASSESSMENT: 0-10

## 2024-07-08 NOTE — CARE PLAN
The clinical goals for the shift include Pt will maintain safety, free from falls and injuries throughout this shift      Problem: General Stroke  Goal: Establish a mutual long term goal with patient by discharge  Outcome: Progressing  Goal: Demonstrate improvement in neurological exam throughout the shift  Outcome: Progressing  Goal: Maintain BP within ordered limits throughout shift  Outcome: Progressing  Goal: Participate in treatment (ie., meds, therapy) throughout shift  Outcome: Progressing  Goal: No symptoms of aspiration throughout shift  Outcome: Progressing  Goal: No symptoms of hemorrhage throughout shift  Outcome: Progressing  Goal: Tolerate enteral feeding throughout shift  Outcome: Progressing  Goal: Decreased nausea/vomiting throughout shift  Outcome: Progressing  Goal: Controlled blood glucose throughout shift  Outcome: Progressing  Goal: Out of bed three times today  Outcome: Progressing     Problem: ICU Stroke  Goal: Maintain ICP within ordered limits throughout shift  Outcome: Progressing  Goal: Tolerate EVD clamping trial throughout shift  Outcome: Progressing  Goal: Tolerate ventilator weaning trial during shift  Outcome: Progressing  Goal: Maintain patent airway throughout shift  Outcome: Progressing  Goal: Achieve/maintain targeted sodium level throughout shift  Outcome: Progressing     Problem: Skin  Goal: Prevent/manage excess moisture  Outcome: Progressing  Flowsheets (Taken 7/7/2024 2679)  Prevent/manage excess moisture: Cleanse incontinence/protect with barrier cream

## 2024-07-08 NOTE — PROGRESS NOTES
Jordon Waddell is a 73 y.o. male on day 12 of admission presenting with Intracranial hemorrhage (Multi).    Subjective   No acute events overnight. Feels stable compared to yesterday. Denies left lower leg pain or swelling.      Objective   Heart Rate:  [60-80]   Temp:  [35.7 °C (96.3 °F)-36.6 °C (97.9 °F)]   Resp:  [16-18]   BP: (101-111)/(58-68)   Weight:  [95.6 kg (210 lb 12.8 oz)]   SpO2:  [93 %-95 %]       Physical Exam  Neurological Exam  Mental status: Oriented x4, converses normally with intact language domains. Following simple and complex commands.  CN: Pupils equal and reactive, VF intact, EOM intact, L upper < lower face droop, no dysarthria.  Motor: Rt side 5/5, LUE flicker finger movements, LLE AG with mild drift, some knee flexion, dorsi/plantar flexion 5/5. Hypometric saccades in up, left, right gaze but not in down gaze.  Sensory: intact in all extremities with no neglect  Coordination intact FTN In RUE  Gait Deferred.  Reflexes: slightly more hyperreflexic on left compared to right.    Relevant Results    NIH Stroke Scale  1A. Level of Consciousness: Alert, Keenly Responsive  1B. Ask Month and Age: Both Questions Right  1C. Blink Eyes & Squeeze Hands: Performs Both Tasks  2. Best Gaze: Normal  3. Visual: No Visual Loss  4. Facial Palsy: Partial Paralysis  5A. Motor - Left Arm: No Movement  5B. Motor - Right Arm: No Drift  6A. Motor - Left Leg: Drift  6B. Motor - Right Leg: No Drift  7. Limb Ataxia: Absent  8. Sensory Loss: Normal  9. Best Language: No Aphasia  10. Dysarthria: Normal  11. Extinction and Inattention: No Abnormality  NIH Stroke Scale: 7       Assessment/Plan      Principal Problem:    Intracranial hemorrhage (Multi)  Active Problems:    Leg pain  Jordon Waddell is a 73 y.o. right handed male with a PMHx of HTN, HLD, DM, CKD2, who presents with R thalamic ICH, slightly enlarged on first repeat CHT, stable on third repeat CTH. Initial /86. Likely hypertensive in etiology. Admitted  for further management. TTE EF 50-55%, no PFO, LA not well sen. Passed MBSS for modified diet. PT/OT recommend IAR. Course complicated by fever of unknown origin(negative infectious work up), completed 48hx of Abx treament. Due to that and pain in the LLE leg, DVT US done and showed left soleal vein LDVT, given recent ICH, vascular medicine consulted and recommended monitoring with twice weekly dvt US for two weeks then monitor clinically (repeat US DVT 7/5 showed new left gastrocnemius vein DVT, discussed with vascualr medicine Dr. Wilson and plan is to continue monitoring with bi weekly US). Per their instruction he should continue on the chemoDVT ppx while in the rehab and continue having SCDs in both legs at all times (unless while ambulating).    From a stroke perspective, if the DVT demonstrate propagation needing AC anticoagulation, we would be ok with him starting on a therapeutic anticoagulation for that given that benefit are likely to out weight the risks of re bleed.     Updates:  - Medically ready for dc, pending precert  - next repeat DVT us ordered for 7/9/24 if pt is in house      #R thalamic ICH  #HTN, HLD  - Tele  - SBP goal 130-150  - Hold home ASA  - c/w lisinopril ot 20mg BID, Amlodipine 5mg.   - c/w hydrochlorothiazide 12.5mg BID.  - c/w Rosuvastatin 40mg  - SLP failed x2 -> MBSS -> passed for soft-bite sized and mild thick liquids.  - Hold Home ASA I/s/o bleeding      #Elevated temp  :: Max 38.8 6/29,   :: Asmyptomatic  - Hold tylenol if possible  - No leukocytosis. Bcx, UA, CXR unrearkable  - sp jennifer/vanc for 48h then stopped.  [x] DVT US BLE 7/2 - New acute left soleal DVT  - Consult vascular medicine for recommendations given ICH: recommend  : Bi weekly DVT U/S for two weeks, c/w chemo dvt ppx nd SCDs at all times.  - Repeat DVT US 7/5 showed new left gastrocnemius DVT, discussed with vacular medicine who recommended continued monitoring with biweekly DVT scans.  - From stroke perspective,  ok to start AC if DVT probagates and warrants Anticoagulation.  - Next US ordered 7/9/24      #Heel pain:    - Ankle XR, no fracture, possible calcaneal spur  - Lidocaine patch    #DM2  :: A1c 6.2  - Hold home meds  - ISS and hypoglycemia protocol    #CKD2 - stable Cr    #Dispo: IAR per PT/OT.    DVT ppx: SCDs, SQH  Code: Full code      Josh Hurt MD PhD

## 2024-07-09 ENCOUNTER — APPOINTMENT (OUTPATIENT)
Dept: VASCULAR MEDICINE | Facility: HOSPITAL | Age: 74
DRG: 064 | End: 2024-07-09
Payer: MEDICARE

## 2024-07-09 VITALS
HEIGHT: 67 IN | TEMPERATURE: 97 F | SYSTOLIC BLOOD PRESSURE: 104 MMHG | OXYGEN SATURATION: 93 % | HEART RATE: 66 BPM | DIASTOLIC BLOOD PRESSURE: 65 MMHG | RESPIRATION RATE: 17 BRPM | WEIGHT: 213.41 LBS | BODY MASS INDEX: 33.49 KG/M2

## 2024-07-09 LAB
BASOPHILS # BLD AUTO: 0.04 X10*3/UL (ref 0–0.1)
BASOPHILS NFR BLD AUTO: 0.6 %
EOSINOPHIL # BLD AUTO: 0.25 X10*3/UL (ref 0–0.4)
EOSINOPHIL NFR BLD AUTO: 3.8 %
ERYTHROCYTE [DISTWIDTH] IN BLOOD BY AUTOMATED COUNT: 12.8 % (ref 11.5–14.5)
GLUCOSE BLD MANUAL STRIP-MCNC: 174 MG/DL (ref 74–99)
GLUCOSE BLD MANUAL STRIP-MCNC: 189 MG/DL (ref 74–99)
GLUCOSE BLD MANUAL STRIP-MCNC: 217 MG/DL (ref 74–99)
GLUCOSE BLD MANUAL STRIP-MCNC: 227 MG/DL (ref 74–99)
HCT VFR BLD AUTO: 47.1 % (ref 41–52)
HGB BLD-MCNC: 15.4 G/DL (ref 13.5–17.5)
IMM GRANULOCYTES # BLD AUTO: 0.03 X10*3/UL (ref 0–0.5)
IMM GRANULOCYTES NFR BLD AUTO: 0.5 % (ref 0–0.9)
LYMPHOCYTES # BLD AUTO: 1.11 X10*3/UL (ref 0.8–3)
LYMPHOCYTES NFR BLD AUTO: 16.8 %
MCH RBC QN AUTO: 29.7 PG (ref 26–34)
MCHC RBC AUTO-ENTMCNC: 32.7 G/DL (ref 32–36)
MCV RBC AUTO: 91 FL (ref 80–100)
MONOCYTES # BLD AUTO: 0.87 X10*3/UL (ref 0.05–0.8)
MONOCYTES NFR BLD AUTO: 13.2 %
NEUTROPHILS # BLD AUTO: 4.31 X10*3/UL (ref 1.6–5.5)
NEUTROPHILS NFR BLD AUTO: 65.1 %
NRBC BLD-RTO: 0 /100 WBCS (ref 0–0)
PLATELET # BLD AUTO: 175 X10*3/UL (ref 150–450)
RBC # BLD AUTO: 5.19 X10*6/UL (ref 4.5–5.9)
WBC # BLD AUTO: 6.6 X10*3/UL (ref 4.4–11.3)

## 2024-07-09 PROCEDURE — 2500000005 HC RX 250 GENERAL PHARMACY W/O HCPCS

## 2024-07-09 PROCEDURE — 99233 SBSQ HOSP IP/OBS HIGH 50: CPT

## 2024-07-09 PROCEDURE — 82947 ASSAY GLUCOSE BLOOD QUANT: CPT

## 2024-07-09 PROCEDURE — 2500000002 HC RX 250 W HCPCS SELF ADMINISTERED DRUGS (ALT 637 FOR MEDICARE OP, ALT 636 FOR OP/ED)

## 2024-07-09 PROCEDURE — 85025 COMPLETE CBC W/AUTO DIFF WBC: CPT | Performed by: STUDENT IN AN ORGANIZED HEALTH CARE EDUCATION/TRAINING PROGRAM

## 2024-07-09 PROCEDURE — 2500000002 HC RX 250 W HCPCS SELF ADMINISTERED DRUGS (ALT 637 FOR MEDICARE OP, ALT 636 FOR OP/ED): Performed by: STUDENT IN AN ORGANIZED HEALTH CARE EDUCATION/TRAINING PROGRAM

## 2024-07-09 PROCEDURE — 2500000001 HC RX 250 WO HCPCS SELF ADMINISTERED DRUGS (ALT 637 FOR MEDICARE OP)

## 2024-07-09 PROCEDURE — 93970 EXTREMITY STUDY: CPT | Performed by: INTERNAL MEDICINE

## 2024-07-09 PROCEDURE — 92526 ORAL FUNCTION THERAPY: CPT | Mod: GN

## 2024-07-09 PROCEDURE — 2500000001 HC RX 250 WO HCPCS SELF ADMINISTERED DRUGS (ALT 637 FOR MEDICARE OP): Performed by: STUDENT IN AN ORGANIZED HEALTH CARE EDUCATION/TRAINING PROGRAM

## 2024-07-09 PROCEDURE — 36415 COLL VENOUS BLD VENIPUNCTURE: CPT | Performed by: STUDENT IN AN ORGANIZED HEALTH CARE EDUCATION/TRAINING PROGRAM

## 2024-07-09 PROCEDURE — 2500000004 HC RX 250 GENERAL PHARMACY W/ HCPCS (ALT 636 FOR OP/ED)

## 2024-07-09 PROCEDURE — 93970 EXTREMITY STUDY: CPT

## 2024-07-09 RX ORDER — HEPARIN SODIUM 5000 [USP'U]/ML
5000 INJECTION, SOLUTION INTRAVENOUS; SUBCUTANEOUS EVERY 8 HOURS SCHEDULED
Start: 2024-07-09 | End: 2024-08-08

## 2024-07-09 ASSESSMENT — COGNITIVE AND FUNCTIONAL STATUS - GENERAL
DRESSING REGULAR LOWER BODY CLOTHING: TOTAL
STANDING UP FROM CHAIR USING ARMS: TOTAL
HELP NEEDED FOR BATHING: TOTAL
STANDING UP FROM CHAIR USING ARMS: TOTAL
EATING MEALS: A LITTLE
TURNING FROM BACK TO SIDE WHILE IN FLAT BAD: A LOT
CLIMB 3 TO 5 STEPS WITH RAILING: TOTAL
MOVING FROM LYING ON BACK TO SITTING ON SIDE OF FLAT BED WITH BEDRAILS: A LITTLE
DRESSING REGULAR UPPER BODY CLOTHING: TOTAL
TOILETING: A LOT
HELP NEEDED FOR BATHING: TOTAL
DRESSING REGULAR UPPER BODY CLOTHING: TOTAL
EATING MEALS: A LITTLE
MOVING TO AND FROM BED TO CHAIR: A LOT
CLIMB 3 TO 5 STEPS WITH RAILING: TOTAL
TOILETING: A LOT
MOVING FROM LYING ON BACK TO SITTING ON SIDE OF FLAT BED WITH BEDRAILS: A LITTLE
MOVING TO AND FROM BED TO CHAIR: A LOT
PERSONAL GROOMING: A LOT
PERSONAL GROOMING: A LOT
TURNING FROM BACK TO SIDE WHILE IN FLAT BAD: A LOT
WALKING IN HOSPITAL ROOM: TOTAL
MOBILITY SCORE: 10
DAILY ACTIVITIY SCORE: 10
MOBILITY SCORE: 10
WALKING IN HOSPITAL ROOM: TOTAL
DAILY ACTIVITIY SCORE: 10
DRESSING REGULAR LOWER BODY CLOTHING: TOTAL

## 2024-07-09 ASSESSMENT — PAIN SCALES - GENERAL: PAINLEVEL_OUTOF10: 0 - NO PAIN

## 2024-07-09 ASSESSMENT — PAIN - FUNCTIONAL ASSESSMENT: PAIN_FUNCTIONAL_ASSESSMENT: 0-10

## 2024-07-09 NOTE — CARE PLAN
Problem: Skin  Goal: Prevent/manage excess moisture  Outcome: Progressing  Flowsheets (Taken 7/9/2024 9157)  Prevent/manage excess moisture: Cleanse incontinence/protect with barrier cream      The clinical goals for the shift include patient will maintain neuro exam throughout the shift    Patients neuro exam  unchanged.  Had a difficult time sleeping over night.

## 2024-07-09 NOTE — PROGRESS NOTES
Physical Therapy                 Therapy Communication Note    Patient Name: Jordon Waddell  MRN: [id number]  Today's Date: 7/9/2024     Discipline: Physical Therapy    Missed Visit Reason: Missed Visit Reason:  (pt currently on bed pan, additionally per NCP pt BP is 103/69 with goal -150.  RN notified)    Missed Time: Attempt 1235

## 2024-07-09 NOTE — CONSULTS
Wound Care Consult     Visit Date: 7/9/2024      Patient Name: Jordon Waddell         MRN: [id number]           YOB: 1950     Reason for Consult: reassessment of Left heel pressure injury         Wound History: Hx of recent fall      Pertinent Labs:   Albumin   Date Value Ref Range Status   07/06/2024 4.1 3.4 - 5.0 g/dL Final     ALBUMIN (MG/L) IN URINE   Date Value Ref Range Status   02/03/2021 47.9 Not Established mg/L Final       Wound Assessment:  Wound 07/02/24 Pressure Injury Heel Left;Lateral (Active)   Wound Image   07/09/24 1332   Site Assessment Purple;Non-blanchable erythema 07/09/24 1332   [place]-Wound Assessment Boggy;Non-blanchable erythema 07/09/24 1332   Non-staged Wound Description Not applicable 07/09/24 1332   Pressure Injury Stage DTPI 07/09/24 1332   Shape Round  07/09/24 1332   Wound Length (cm) 2 cm 07/09/24 1332   Wound Width (cm) 2 cm 07/09/24 1332   Wound Surface Area (cm^2) 4 cm^2 07/09/24 1332   Wound Depth (cm) 0 cm 07/09/24 1332   Wound Volume (cm^3) 0 cm^3 07/09/24 1332   State of Healing Healing ridge 07/09/24 1332   Margins Poorly defined 07/09/24 1332       Wound Team Summary Assessment: The wound care team came to bedside to assess the patient left heel for a possible pressure injury.  The patient's heel is a purple, nonblanchable and boggy DTPI.  The wound was cleansed with vashe wound cleanser and gently pat dry with a 4x4 cover sponge and prepped with 3M cavilon skin barrier film.  The heel was left open to air and placed in a EHOB Duy boot.      Wound Team Plan:   Recommendation: Daily inspection  Cleanse the heel with bath wipes or soap and water  Leave the heel open to air and in the EHOB boot to offload the heel.      ELAINE Wick  7/9/2024  4:33 PM

## 2024-07-09 NOTE — PROGRESS NOTES
Speech-Language Pathology    SLP Adult Inpatient  Speech-Language Pathology Treatment     Patient Name: Jordon Waddell  MRN: [id number]  Today's Date: 7/9/2024  Time Calculation  Start Time: 0910  Stop Time: 0920  Time Calculation (min): 10 min       Assessment/Impression:   Mild/moderate oropharyngeal dysphagia per MBSS 6/28-stable         Recommendations:  Solid Diet Recommendations : Easy to Chew   Liquid Diet Recommendations: Mildly/nectar thick (IDDSI Level 2)   *Free Water Protocol- allow 4 oz un-thickened water between meals after oral care.   Medication Administration: Per pt preference      Safe Swallow Strategies/Guidelines:  Only present PO intake when awake and alert  Supervision/assist w/ PO intake to assure adherence to safe swallow strategies   Upright positioning   Slow rate/small boluses         Plan:  SLP Plan: Skilled SLP warranted  SLP Frequency: 2x per week  Duration: 3 weeks      Discussed POC: Patient, Medical Team   Discussed Risks/Benefits: Yes  Patient/Caregiver Agreeable: Yes  Continued SLP services at next level of care        Goals:  Pt will tolerate least restrictive diet with adequate oral phase and no s/s of aspiration.              Date initiated: 6/28/24              Status: Progressing      Pt will adhere to safe swallow strategies during PO intake with > 90% accuracy independently.                 Date initiated: 6/28/24              Status: Progressing      Pt will complete 10 reps of pharyngeal strengthening exercises x3 during session with good effort and efficiency given min cues for improved swallow safety and efficiency               Date initiated: 6/28/24              Status: Progressing        Subjective:  Pt properly identified and treated bedside.  Awake and alert, with no c/o pain.  Room air.  No family present.  Plans for discharge to acute rehab later today.      Pt admitted w/ L weakness and dysarthria- R thalamic ICH.  MBSS completed 6/28 showed mild/moderate  oropharyngeal dysphagia characterized by reduced oral phase impact by L weakness, delayed swallow initiation, reduced TBR and pharyngeal stripping.  Consistent deep penetration during the swallow with thin liquids, not entirely ejected.  One instance of silent aspiration following the swallow.  Mild residue following the swallow at the valleculae with solids.            Objective:   Pt completed pharyngeal strengthening exercises x20 with good effort and efficiency.  Education provided re: independent practice.  Understanding indicated.  Able to describe free water protocol via teach back method with > 90% accuracy.  Reports good tolerance.  No overt s/s of aspiration with single sips of water; s/s noted w/ successive boluses.  Discussed plans for continued Speech Therapy at acute rehab.  Understanding indicated.       07/09/24 at 3:48 PM - Nell Jasmine, SLP

## 2024-07-09 NOTE — DISCHARGE SUMMARY
Discharge Diagnosis  Intracranial hemorrhage (Multi)    Problem List  Principal Problem:    Intracranial hemorrhage (Multi)  Active Problems:    Leg pain      Jordon Waddell is a 73 y.o. male who presented to the hospital with R thalamic ICH. They were diagnosed with a intracerebral hemorrhage.  Etiology: Intracranial hemorrhage: Hypertensive hemorrhage    Relevant hospital complications: left soleal vein DVT  Discharge antithrombotics: n/a  Pending evaluation:  n/a   Issues Requiring Follow-Up    - Bi weekly DVT U/S while at rehab, - next repeat DVT US should be on 7/12/24  -Stroke, PCP, vascular medicine follow up ordered  - For BP: increased home amlodipine to 10mg. Resumed lisinopril20mg-hydrochlorothiazide 12.5mg BID,  Might require further medication adjustments while in rehab or with PCP.    No MRI head results found for the past 14 days  CT head wo IV contrast    Result Date: 6/27/2024  Interpreted By:  Aaron Soto, STUDY: CT HEAD WO IV CONTRAST;  6/26/2024 9:41 pm   INDICATION: Signs/Symptoms:R thalamic ICH.   COMPARISON: 06/26/2024 time 12:35 p.m.   ACCESSION NUMBER(S): [id number]   ORDERING CLINICIAN: ROSETTA CORNEJO   TECHNIQUE: Axial CT images of the head were obtained without intravenous contrast administration.   FINDINGS: There is again evidence of a hyperdense intraparenchymal hemorrhage centered within the posterior right basal ganglia with cranial extension into the right corona radiata similar in size and configuration when compared with the prior study dated 06/26/2024 time 12:35 p.m.. Specifically, using similar points of measurement on the current and prior study, it measures approximately 19 mm in greatest AP dimension by 22 mm in greatest transverse dimension by 34 mm in greatest craniocaudal dimension on both the current and prior study. There is mild surrounding hypodensity compatible with surrounding edema.   The above findings are again noted be superimposed upon moderate brain  parenchymal volume loss.   Mild nonspecific white matter changes are again noted within cerebral hemispheres bilaterally which while nonspecific, given the patient's age, likely represent sequelae of small-vessel ischemic change.   Additional small scattered hypodensities are noted within the subinsular regions and basal ganglia bilaterally as well as suspected overlying the brainstem suggesting incidental mildly prominent perivascular spaces and/or scattered more remote lacunar infarctions.   There is no midline shift.   There is a retention cyst or polyp noted within the inferior left maxillary sinus. The remaining paranasal sinuses and mastoid air cells are clear.   No acute fracture is noted.       There has been no significant interval change when compared with the prior study dated 06/26/2024 time 12:35 p.m. as described above.   MACRO: None.   Signed by: Aaron Soto 6/27/2024 6:56 AM Dictation workstation:   [id number]    CT head wo IV contrast    Result Date: 6/26/2024  Interpreted By:  Kadeem Yip, STUDY: CT HEAD WO IV CONTRAST;  6/26/2024 12:41 pm   INDICATION: Signs/Symptoms:stability scan.   COMPARISON: None.   ACCESSION NUMBER(S): [id number]   ORDERING CLINICIAN: LISA RUGGIERO   TECHNIQUE: Noncontrast axial CT scan of head was performed. Angled reformats in brain and bone windows were generated. The images were reviewed in bone, brain, blood and soft tissue windows.   FINDINGS: Right-sided intraparenchymal basal ganglia hematoma is estimated at 3.1 x 1.9 x 1.6 cm series 2, image 21 and series 2010, image 64 compared with 2.4 x 1.5 x 1.0 cm. There is minimal surrounding low-density edema. Similar mild asymmetric effacement of the adjacent lateral ventricle without significant midline shift or uncal herniation.   No acute fracture. Trace mucosal thickening paranasal sinuses.       Slight measurable increase size of intraparenchymal hematoma, 3.1 x 1.9 x 1.6 cm compared with 2.4 x 1.5 x 1.0 cm   with similar degree of mass effect.   MACRO: Kadeem Yip discussed the significance and urgency of this critical finding by secure chat with  LISA RUGGIERO on 6/26/2024 at 12:53 pm.  (**-RCF-**) Findings:  See findings.     Signed by: Kadeem Yip 6/26/2024 12:53 PM Dictation workstation:   [id number]   Transthoracic Echo (TTE) Complete    Result Date: 6/27/2024   [facility], [address] [place] [address]                Tel [phone] and Fax [phone] TRANSTHORACIC ECHOCARDIOGRAM REPORT  Patient Name:      RAFAELA Hanks Physician:    [id number] Tim Fiore MD Study Date:        6/27/2024            Ordering Provider:    [id number] ROSETTA CORNEJO MRN/PID:           [id number]             Fellow: Accession#:        [id number]         Nurse: Date of Birth/Age: 1950 / 73      Sonographer:          Candice ken                                      RDCS, RVT Gender:            M                    Additional Staff: Height:            170.18 cm            Admit Date:           6/26/2024 Weight:            103.87 kg            Admission Status:     Inpatient -                                                               Routine BSA / BMI:         2.14 m2 / 35.87      Encounter#:           [id number]                    kg/m2 Blood Pressure:    144/61 mmHg          Department Location:  [facility] Study Type:    TRANSTHORACIC ECHO (TTE) COMPLETE Diagnosis/ICD: Nontraumatic intracranial hemorrhage, unspecified-I62.9; Cerebral                infarction due to unspecified occlusion or stenosis of right                posterior cerebral artery-I63.531 Indication:    Cerebrovascular Accident CPT Code:      Echo Complete w Full Doppler-[id number] Patient History: Pertinent History: CVA. HLD. T2DM. CKD. HTN. SIGIFREDO. Study Detail: The following Echo studies were performed: 2D, M-Mode, Doppler and                color flow. Technically challenging study due to body habitus and               poor acoustic windows. Definity used as a contrast agent for               endocardial border definition and agitated saline used as a               contrast agent for intraseptal flow evaluation. Total contrast               used for this procedure was 4 mL via IV push.  PHYSICIAN INTERPRETATION: Left Ventricle: Left ventricular ejection fraction is low normal, by visual estimate at 50-55%. There are no regional wall motion abnormalities. The left ventricular cavity size is normal. There is a false tendon visualized in the left ventricle. Left ventricular diastolic filling was indeterminate. There is no definite left ventricular thrombus visualized. Left Atrium: The left atrium was not well visualized. There is no evidence of a patent foramen ovale. A bubble study using agitated saline was performed. Bubble study is negative. Right Ventricle: The right ventricle was not well visualized. Right ventricular systolic function not assessed. Right Atrium: The right atrium was not well visualized. Aortic Valve: The aortic valve is trileaflet. There is mild aortic valve thickening. There is aortic valve annular calcification. There is no evidence of aortic valve regurgitation. The peak instantaneous gradient of the aortic valve is 6.0 mmHg. Mitral Valve: The mitral valve is normal in structure. There is no evidence of mitral valve regurgitation. Tricuspid Valve: The tricuspid valve is structurally normal. There is trace tricuspid regurgitation. The right ventricular systolic pressure is unable to be estimated. Pulmonic Valve: The pulmonic valve is structurally normal. There is trace pulmonic valve regurgitation. Pericardium: There is no pericardial effusion noted. Aorta: The aortic root is normal. Systemic Veins: The inferior vena cava appears to be of normal size. There is IVC inspiratory collapse greater than 50%. In comparison to  the previous echocardiogram(s): There are no prior studies on this patient for comparison purposes.  CONCLUSIONS:  1. Poorly visualized anatomical structures due to suboptimal image quality.  2. Left ventricular ejection fraction is low normal, by visual estimate at 50-55%.  3. Left ventricular diastolic filling was indeterminate.  4. No left ventricular thrombus visualized.  5. Right ventricular systolic function not assessed.  6. There is aortic valve annular calcification.  7. There is no evidence of a patent foramen ovale. QUANTITATIVE DATA SUMMARY: 2D MEASUREMENTS:                          Normal Ranges: Ao Root d:     3.70 cm   (2.0-3.7cm) LAs:           3.50 cm   (2.7-4.0cm) IVSd:          0.70 cm   (0.6-1.1cm) LVPWd:         0.70 cm   (0.6-1.1cm) LVIDd:         5.60 cm   (3.9-5.9cm) LVIDs:         3.70 cm LV Mass Index: 65.3 g/m2 LV % FS        33.9 % AORTA MEASUREMENTS:                    Normal Ranges: Asc Ao, d: 3.20 cm (2.1-3.4cm) LV SYSTOLIC FUNCTION BY 2D PLANIMETRY (MOD):                      Normal Ranges: EF-A4C View:    49 % (>=55%) EF-A2C View:    60 % EF-Biplane:     55 % EF-Visual:      53 % LV EF Reported: 53 % LV DIASTOLIC FUNCTION:                           Normal Ranges: MV Peak E:    0.76 m/s    (0.7-1.2 m/s) MV Peak A:    1.00 m/s    (0.42-0.7 m/s) E/A Ratio:    0.76        (1.0-2.2) MV e'         0.075 m/s   (>8.0) MV lateral e' 0.07 m/s MV medial e'  0.08 m/s MV A Dur:     116.00 msec E/e' Ratio:   10.23       (<8.0) MITRAL VALVE:                 Normal Ranges: MV DT: 151 msec (150-240msec) AORTIC VALVE:                         Normal Ranges: AoV Vmax:      1.22 m/s (<=1.7m/s) AoV Peak P.0 mmHg (<20mmHg) LVOT Max Rinku:  0.70 m/s (<=1.1m/s) LVOT VTI:      17.90 cm LVOT Diameter: 2.10 cm  (1.8-2.4cm) AoV Area,Vmax: 2.00 cm2 (2.5-4.5cm2) TRICUSPID VALVE/RVSP:                   Normal Ranges: IVC Diam: 1.90 cm PULMONIC VALVE:                         Normal Ranges: PV Accel Time: 79 msec   (>120ms) PV Max Rinku:    0.8 m/s  (0.6-0.9m/s) PV Max P.8 mmHg  [id number] Tim Fiore MD Electronically signed on 2024 at 4:43:31 PM  ** Final **          BNP   Date/Time Value Ref Range Status   2024 05:38 PM 51 0 - 99 pg/mL Final       Test Results Pending At Discharge  Pending Labs       No current pending labs.            Hospital Course  Jordon Waddell is a 73 y.o. right handed male with a PMHx of HTN, HLD, DM, CKD2, who presents with R thalamic ICH, slightly enlarged on first repeat CHT, stable on third repeat CTH. Initial /86. Likely hypertensive in etiology. Admitted for further management. TTE EF 50-55%, no PFO, LA not well seen. Passed MBSS for modified diet. PT/OT recommend IAR. Course complicated by fever of unknown origin(negative infectious work up), completed 48hx of Abx treament. Due to that and pain in the LLE leg, DVT US done and showed left soleal vein LDVT, given recent ICH, vascular medicine consulted and recommended monitoring with twice weekly dvt US for two weeks then monitor clinically (repeat US DVT  showed new left gastrocnemius vein DVT, discussed with vascular medicine Dr. Wilson and plan is to continue monitoring with bi weekly US). Per their instruction he should continue on the chemoDVT ppx while in the rehab and continue having SCDs in both legs at all times (unless while ambulating).    From a stroke perspective, if the DVT demonstrate propagation needing AC anticoagulation, we would be ok with him starting on a therapeutic anticoagulation for that given that benefit are likely to out weight the risks of re bleed.     Repeat DVT scan on  showed re demonstration of non occlusive left soleal thrombus. Patient was discharged to rehab on .    Pertinent Physical Exam At Time of Discharge  Physical Exam  Neurological Exam  Mental status: Oriented x4, converses normally with intact language domains. Following simple and complex commands.  CN: Pupils  equal and reactive, VF intact, EOM intact, L upper < lower face droop, no dysarthria.  Motor: Rt side 5/5, LUE flicker finger movements, LLE AG with mild drift, some knee flexion, dorsi/plantar flexion 5/5.  Sensory: intact in all extremities with no neglect  Coordination intact FTN In RUE  Gait Deferred.  Reflexes: slightly more hyperreflexic on left compared to right.     Relevant Results     NIH Stroke Scale  1A. Level of Consciousness: Alert, Keenly Responsive  1B. Ask Month and Age: Both Questions Right  1C. Blink Eyes & Squeeze Hands: Performs Both Tasks  2. Best Gaze: Normal  3. Visual: No Visual Loss  4. Facial Palsy: Partial Paralysis  5A. Motor - Left Arm: No Movement  5B. Motor - Right Arm: No Drift  6A. Motor - Left Leg: Drift  6B. Motor - Right Leg: No Drift  7. Limb Ataxia: Absent  8. Sensory Loss: Normal  9. Best Language: No Aphasia  10. Dysarthria: Normal  11. Extinction and Inattention: No Abnormality  NIH Stroke Scale: 7         Outpatient Follow-Up  Future Appointments   Date Time Provider Department Center   7/9/2024  3:00 PM [facility] VASC 1 [id number] [facility]   7/23/2024  2:00 PM Sugey Peters, APRN-CNP [id number] [facility]   12/30/2024 10:15 AM Tae Colby MD [id number] [facility]       Josh Hurt MD PhD  PGY-2 Neurology

## 2024-07-09 NOTE — PROGRESS NOTES
[facility] scheduled transport and [facility] is scheduled to arrive here to the patient's room at 6:00pm, to transport patient to [facility]. The report number is [phone]. Blue sheet is at from desk.

## 2024-07-09 NOTE — PROGRESS NOTES
Jordon Waddell is a 73 y.o. male on day 13 of admission presenting with Intracranial hemorrhage (Multi).    Subjective   No acute events overnight. Feels improved compared to yesterday. Says left arm moves unconsciously (first time was yesterday afternoon and it scared his wife). Left leg also able to lift off of bed today.      Objective   Heart Rate:  [58-72]   Temp:  [36 °C (96.8 °F)-36.9 °C (98.4 °F)]   Resp:  [16-18]   BP: ()/(55-73)   Weight:  [96.8 kg (213 lb 6.5 oz)]   SpO2:  [93 %-97 %]       Physical Exam  Neurological Exam  Mental status: Oriented x4, converses normally with intact language domains. Following simple and complex commands.  CN: Pupils equal and reactive, VF intact, EOM intact, L upper < lower face droop, no dysarthria.  Motor: Rt side 5/5, LUE flicker finger movements, LLE AG with mild drift, some knee flexion, dorsi/plantar flexion 5/5.  Sensory: intact in all extremities with no neglect  Coordination intact FTN In RUE  Gait Deferred.  Reflexes: slightly more hyperreflexic on left compared to right.    Relevant Results    NIH Stroke Scale  1A. Level of Consciousness: Alert, Keenly Responsive  1B. Ask Month and Age: Both Questions Right  1C. Blink Eyes & Squeeze Hands: Performs Both Tasks  2. Best Gaze: Normal  3. Visual: No Visual Loss  4. Facial Palsy: Partial Paralysis  5A. Motor - Left Arm: No Movement  5B. Motor - Right Arm: No Drift  6A. Motor - Left Leg: Drift  6B. Motor - Right Leg: No Drift  7. Limb Ataxia: Absent  8. Sensory Loss: Normal  9. Best Language: No Aphasia  10. Dysarthria: Normal  11. Extinction and Inattention: No Abnormality  NIH Stroke Scale: 7       Assessment/Plan      Principal Problem:    Intracranial hemorrhage (Multi)  Active Problems:    Leg pain  Jordon Waddell is a 73 y.o. right handed male with a PMHx of HTN, HLD, DM, CKD2, who presents with R thalamic ICH, slightly enlarged on first repeat CHT, stable on third repeat CTH. Initial /86. Likely  hypertensive in etiology. Admitted for further management. TTE EF 50-55%, no PFO, LA not well sen. Passed MBSS for modified diet. PT/OT recommend IAR. Course complicated by fever of unknown origin(negative infectious work up), completed 48hx of Abx treament. Due to that and pain in the LLE leg, DVT US done and showed left soleal vein LDVT, given recent ICH, vascular medicine consulted and recommended monitoring with twice weekly dvt US for two weeks then monitor clinically (repeat US DVT 7/5 showed new left gastrocnemius vein DVT, discussed with vascualr medicine Dr. Wilson and plan is to continue monitoring with bi weekly US). Per their instruction he should continue on the chemoDVT ppx while in the rehab and continue having SCDs in both legs at all times (unless while ambulating).    From a stroke perspective, if the DVT demonstrate propagation needing AC anticoagulation, we would be ok with him starting on a therapeutic anticoagulation for that given that benefit are likely to out weight the risks of re bleed.     Updates:  - Medically ready for dc, transport arranged for this afternoon  - repeat DVT scan today at 3 pm      #R thalamic ICH  #HTN, HLD  - Tele  - SBP goal 130-150  - Hold home ASA  - c/w lisinopril ot 20mg BID, Amlodipine 5mg.   - c/w hydrochlorothiazide 12.5mg BID.  - c/w Rosuvastatin 40mg  - SLP failed x2 -> MBSS -> passed for soft-bite sized and mild thick liquids.  - Hold Home ASA I/s/o bleeding      #Elevated temp  :: Max 38.8 6/29,   :: Asmyptomatic  - Hold tylenol if possible  - No leukocytosis. Bcx, UA, CXR unrearkable  - sp jennifer/vanc for 48h then stopped.  [x] DVT US BLE 7/2 - New acute left soleal DVT  - Consult vascular medicine for recommendations given ICH: recommend  : Bi weekly DVT U/S for two weeks, c/w chemo dvt ppx nd SCDs at all times.  - Repeat DVT US 7/5 showed new left gastrocnemius DVT, discussed with vacular medicine who recommended continued monitoring with biweekly DVT  scans.  - From stroke perspective, ok to start AC if DVT probagates and warrants Anticoagulation.  - Next US ordered 7/9/24      #Heel pain:    - Ankle XR, no fracture, possible calcaneal spur  - Lidocaine patch    #DM2  :: A1c 6.2  - Hold home meds  - ISS and hypoglycemia protocol    #CKD2 - stable Cr    #Dispo: IAR per PT/OT.    DVT ppx: SCDs, SQH  Code: Full code      Josh Hurt MD PhD  PGY-2 Neurology

## 2024-07-09 NOTE — PROGRESS NOTES
Occupational Therapy                 Therapy Communication Note    Patient Name: Jordon Waddell  MRN: [id number]  Today's Date: 7/9/2024     Discipline: Occupational Therapy    Missed Visit Reason: Missed Visit Reason: Patient in a medical procedure    Missed Time: Attempt    Comment:

## 2024-07-10 ENCOUNTER — LAB REQUISITION (OUTPATIENT)
Dept: LAB | Facility: HOSPITAL | Age: 74
End: 2024-07-10
Payer: MEDICARE

## 2024-07-10 DIAGNOSIS — Z51.89 ENCOUNTER FOR OTHER SPECIFIED AFTERCARE: ICD-10-CM

## 2024-07-10 LAB
ALBUMIN SERPL BCP-MCNC: 4 G/DL (ref 3.4–5)
ALP SERPL-CCNC: 110 U/L (ref 33–136)
ALT SERPL W P-5'-P-CCNC: 34 U/L (ref 10–52)
ANION GAP SERPL CALC-SCNC: 15 MMOL/L (ref 10–20)
AST SERPL W P-5'-P-CCNC: 18 U/L (ref 9–39)
BILIRUB SERPL-MCNC: 0.6 MG/DL (ref 0–1.2)
BUN SERPL-MCNC: 62 MG/DL (ref 6–23)
CALCIUM SERPL-MCNC: 10.1 MG/DL (ref 8.6–10.3)
CHLORIDE SERPL-SCNC: 100 MMOL/L (ref 98–107)
CO2 SERPL-SCNC: 26 MMOL/L (ref 21–32)
CREAT SERPL-MCNC: 1.65 MG/DL (ref 0.5–1.3)
EGFRCR SERPLBLD CKD-EPI 2021: 44 ML/MIN/1.73M*2
ERYTHROCYTE [DISTWIDTH] IN BLOOD BY AUTOMATED COUNT: 12.9 % (ref 11.5–14.5)
GLUCOSE SERPL-MCNC: 160 MG/DL (ref 74–99)
HCT VFR BLD AUTO: 46.4 % (ref 41–52)
HGB BLD-MCNC: 15.5 G/DL (ref 13.5–17.5)
MCH RBC QN AUTO: 30.2 PG (ref 26–34)
MCHC RBC AUTO-ENTMCNC: 33.4 G/DL (ref 32–36)
MCV RBC AUTO: 90 FL (ref 80–100)
NRBC BLD-RTO: 0 /100 WBCS (ref 0–0)
PLATELET # BLD AUTO: 164 X10*3/UL (ref 150–450)
POTASSIUM SERPL-SCNC: 4.2 MMOL/L (ref 3.5–5.3)
PROT SERPL-MCNC: 6.4 G/DL (ref 6.4–8.2)
RBC # BLD AUTO: 5.13 X10*6/UL (ref 4.5–5.9)
SODIUM SERPL-SCNC: 137 MMOL/L (ref 136–145)
WBC # BLD AUTO: 6.1 X10*3/UL (ref 4.4–11.3)

## 2024-07-10 PROCEDURE — 85027 COMPLETE CBC AUTOMATED: CPT

## 2024-07-10 PROCEDURE — 80053 COMPREHEN METABOLIC PANEL: CPT

## 2024-07-10 NOTE — NURSING NOTE
EMS transport arrived to floor to transport pt to discharge location, bedside report given, EMS accepted report and transfer of care of pt, pt left floor with EMS transport, vitals stable and WNL

## 2024-07-11 ENCOUNTER — LAB REQUISITION (OUTPATIENT)
Dept: LAB | Facility: HOSPITAL | Age: 74
End: 2024-07-11
Payer: MEDICARE

## 2024-07-11 DIAGNOSIS — Z51.89 ENCOUNTER FOR OTHER SPECIFIED AFTERCARE: ICD-10-CM

## 2024-07-11 LAB
ANION GAP SERPL CALC-SCNC: 18 MMOL/L (ref 10–20)
BUN SERPL-MCNC: 81 MG/DL (ref 6–23)
CALCIUM SERPL-MCNC: 9.9 MG/DL (ref 8.6–10.3)
CHLORIDE SERPL-SCNC: 97 MMOL/L (ref 98–107)
CO2 SERPL-SCNC: 25 MMOL/L (ref 21–32)
CREAT SERPL-MCNC: 2.58 MG/DL (ref 0.5–1.3)
EGFRCR SERPLBLD CKD-EPI 2021: 26 ML/MIN/1.73M*2
GLUCOSE SERPL-MCNC: 156 MG/DL (ref 74–99)
POTASSIUM SERPL-SCNC: 4.2 MMOL/L (ref 3.5–5.3)
SODIUM SERPL-SCNC: 136 MMOL/L (ref 136–145)

## 2024-07-11 PROCEDURE — 80048 BASIC METABOLIC PNL TOTAL CA: CPT

## 2024-07-12 ENCOUNTER — LAB REQUISITION (OUTPATIENT)
Dept: LAB | Facility: HOSPITAL | Age: 74
End: 2024-07-12
Payer: MEDICARE

## 2024-07-12 DIAGNOSIS — Z51.89 ENCOUNTER FOR OTHER SPECIFIED AFTERCARE: ICD-10-CM

## 2024-07-12 LAB
ANION GAP SERPL CALC-SCNC: 16 MMOL/L (ref 10–20)
BUN SERPL-MCNC: 76 MG/DL (ref 6–23)
CALCIUM SERPL-MCNC: 9.8 MG/DL (ref 8.6–10.3)
CHLORIDE SERPL-SCNC: 101 MMOL/L (ref 98–107)
CO2 SERPL-SCNC: 25 MMOL/L (ref 21–32)
CREAT SERPL-MCNC: 2.1 MG/DL (ref 0.5–1.3)
EGFRCR SERPLBLD CKD-EPI 2021: 33 ML/MIN/1.73M*2
GLUCOSE SERPL-MCNC: 156 MG/DL (ref 74–99)
POTASSIUM SERPL-SCNC: 3.6 MMOL/L (ref 3.5–5.3)
SODIUM SERPL-SCNC: 138 MMOL/L (ref 136–145)

## 2024-07-12 PROCEDURE — 80048 BASIC METABOLIC PNL TOTAL CA: CPT

## 2024-07-16 ENCOUNTER — LAB REQUISITION (OUTPATIENT)
Dept: LAB | Facility: HOSPITAL | Age: 74
End: 2024-07-16
Payer: MEDICARE

## 2024-07-16 DIAGNOSIS — Z51.89 ENCOUNTER FOR OTHER SPECIFIED AFTERCARE: ICD-10-CM

## 2024-07-16 LAB
ANION GAP SERPL CALC-SCNC: 15 MMOL/L (ref 10–20)
BUN SERPL-MCNC: 46 MG/DL (ref 6–23)
CALCIUM SERPL-MCNC: 9.8 MG/DL (ref 8.6–10.3)
CHLORIDE SERPL-SCNC: 102 MMOL/L (ref 98–107)
CO2 SERPL-SCNC: 24 MMOL/L (ref 21–32)
CREAT SERPL-MCNC: 1.46 MG/DL (ref 0.5–1.3)
EGFRCR SERPLBLD CKD-EPI 2021: 51 ML/MIN/1.73M*2
ERYTHROCYTE [DISTWIDTH] IN BLOOD BY AUTOMATED COUNT: 13 % (ref 11.5–14.5)
GLUCOSE SERPL-MCNC: 156 MG/DL (ref 74–99)
HCT VFR BLD AUTO: 39 % (ref 41–52)
HGB BLD-MCNC: 13.5 G/DL (ref 13.5–17.5)
MCH RBC QN AUTO: 30.6 PG (ref 26–34)
MCHC RBC AUTO-ENTMCNC: 34.6 G/DL (ref 32–36)
MCV RBC AUTO: 88 FL (ref 80–100)
NRBC BLD-RTO: 0 /100 WBCS (ref 0–0)
PLATELET # BLD AUTO: 174 X10*3/UL (ref 150–450)
POTASSIUM SERPL-SCNC: 3 MMOL/L (ref 3.5–5.3)
RBC # BLD AUTO: 4.41 X10*6/UL (ref 4.5–5.9)
SODIUM SERPL-SCNC: 138 MMOL/L (ref 136–145)
WBC # BLD AUTO: 8.6 X10*3/UL (ref 4.4–11.3)

## 2024-07-16 PROCEDURE — 85027 COMPLETE CBC AUTOMATED: CPT

## 2024-07-16 PROCEDURE — 80048 BASIC METABOLIC PNL TOTAL CA: CPT

## 2024-07-23 ENCOUNTER — LAB REQUISITION (OUTPATIENT)
Dept: LAB | Facility: HOSPITAL | Age: 74
End: 2024-07-23

## 2024-07-23 ENCOUNTER — APPOINTMENT (OUTPATIENT)
Dept: ORTHOPEDIC SURGERY | Facility: HOSPITAL | Age: 74
End: 2024-07-23
Payer: MEDICARE

## 2024-07-23 DIAGNOSIS — Z51.89 ENCOUNTER FOR OTHER SPECIFIED AFTERCARE: ICD-10-CM

## 2024-07-23 LAB
ANION GAP SERPL CALC-SCNC: 13 MMOL/L (ref 10–20)
BUN SERPL-MCNC: 19 MG/DL (ref 6–23)
CALCIUM SERPL-MCNC: 9.5 MG/DL (ref 8.6–10.3)
CHLORIDE SERPL-SCNC: 99 MMOL/L (ref 98–107)
CO2 SERPL-SCNC: 28 MMOL/L (ref 21–32)
CREAT SERPL-MCNC: 1.32 MG/DL (ref 0.5–1.3)
EGFRCR SERPLBLD CKD-EPI 2021: 57 ML/MIN/1.73M*2
ERYTHROCYTE [DISTWIDTH] IN BLOOD BY AUTOMATED COUNT: 13.2 % (ref 11.5–14.5)
GLUCOSE SERPL-MCNC: 147 MG/DL (ref 74–99)
HCT VFR BLD AUTO: 36.8 % (ref 41–52)
HGB BLD-MCNC: 12.7 G/DL (ref 13.5–17.5)
MCH RBC QN AUTO: 29.7 PG (ref 26–34)
MCHC RBC AUTO-ENTMCNC: 34.5 G/DL (ref 32–36)
MCV RBC AUTO: 86 FL (ref 80–100)
NRBC BLD-RTO: 0 /100 WBCS (ref 0–0)
PLATELET # BLD AUTO: 162 X10*3/UL (ref 150–450)
POTASSIUM SERPL-SCNC: 2.9 MMOL/L (ref 3.5–5.3)
RBC # BLD AUTO: 4.28 X10*6/UL (ref 4.5–5.9)
SODIUM SERPL-SCNC: 137 MMOL/L (ref 136–145)
WBC # BLD AUTO: 5.8 X10*3/UL (ref 4.4–11.3)

## 2024-07-23 PROCEDURE — 80048 BASIC METABOLIC PNL TOTAL CA: CPT

## 2024-07-23 PROCEDURE — 85027 COMPLETE CBC AUTOMATED: CPT

## 2024-07-25 ENCOUNTER — LAB REQUISITION (OUTPATIENT)
Dept: LAB | Facility: HOSPITAL | Age: 74
End: 2024-07-25
Payer: MEDICARE

## 2024-07-25 DIAGNOSIS — Z51.89 ENCOUNTER FOR OTHER SPECIFIED AFTERCARE: ICD-10-CM

## 2024-07-25 LAB
ALBUMIN SERPL BCP-MCNC: 3.2 G/DL (ref 3.4–5)
ALP SERPL-CCNC: 94 U/L (ref 33–136)
ALT SERPL W P-5'-P-CCNC: 39 U/L (ref 10–52)
ANION GAP SERPL CALC-SCNC: 14 MMOL/L (ref 10–20)
AST SERPL W P-5'-P-CCNC: 21 U/L (ref 9–39)
BILIRUB SERPL-MCNC: 0.6 MG/DL (ref 0–1.2)
BUN SERPL-MCNC: 18 MG/DL (ref 6–23)
CALCIUM SERPL-MCNC: 9.1 MG/DL (ref 8.6–10.3)
CHLORIDE SERPL-SCNC: 104 MMOL/L (ref 98–107)
CO2 SERPL-SCNC: 25 MMOL/L (ref 21–32)
CREAT SERPL-MCNC: 1.31 MG/DL (ref 0.5–1.3)
EGFRCR SERPLBLD CKD-EPI 2021: 58 ML/MIN/1.73M*2
ERYTHROCYTE [DISTWIDTH] IN BLOOD BY AUTOMATED COUNT: 13.7 % (ref 11.5–14.5)
GLUCOSE SERPL-MCNC: 164 MG/DL (ref 74–99)
HCT VFR BLD AUTO: 37 % (ref 41–52)
HGB BLD-MCNC: 12.8 G/DL (ref 13.5–17.5)
MCH RBC QN AUTO: 30.1 PG (ref 26–34)
MCHC RBC AUTO-ENTMCNC: 34.6 G/DL (ref 32–36)
MCV RBC AUTO: 87 FL (ref 80–100)
NRBC BLD-RTO: 0 /100 WBCS (ref 0–0)
PLATELET # BLD AUTO: 149 X10*3/UL (ref 150–450)
POTASSIUM SERPL-SCNC: 3.7 MMOL/L (ref 3.5–5.3)
PROT SERPL-MCNC: 5.2 G/DL (ref 6.4–8.2)
RBC # BLD AUTO: 4.25 X10*6/UL (ref 4.5–5.9)
SODIUM SERPL-SCNC: 139 MMOL/L (ref 136–145)
WBC # BLD AUTO: 5 X10*3/UL (ref 4.4–11.3)

## 2024-07-25 PROCEDURE — 85027 COMPLETE CBC AUTOMATED: CPT

## 2024-07-25 PROCEDURE — 80053 COMPREHEN METABOLIC PANEL: CPT

## 2024-07-26 ENCOUNTER — NURSING HOME VISIT (OUTPATIENT)
Dept: POST ACUTE CARE | Facility: EXTERNAL LOCATION | Age: 74
End: 2024-07-26
Payer: MEDICARE

## 2024-07-26 DIAGNOSIS — E78.49 OTHER HYPERLIPIDEMIA: ICD-10-CM

## 2024-07-26 DIAGNOSIS — I15.9 SECONDARY HYPERTENSION: ICD-10-CM

## 2024-07-26 DIAGNOSIS — R53.1 WEAKNESS: Primary | ICD-10-CM

## 2024-07-26 DIAGNOSIS — E11.21 DIABETES MELLITUS WITH KIDNEY DISEASE (MULTI): ICD-10-CM

## 2024-07-26 DIAGNOSIS — I69.298 OTHER SEQUELAE OF OTHER NONTRAUMATIC INTRACRANIAL HEMORRHAGE: ICD-10-CM

## 2024-07-26 PROCEDURE — 99309 SBSQ NF CARE MODERATE MDM 30: CPT | Performed by: NURSE PRACTITIONER

## 2024-07-26 NOTE — LETTER
Patient: Jordon Waddell  : 1950    Encounter Date: 2024    PROGRESS NOTE    Subjective  Chief complaint: Jordon Waddell is a 73 y.o. male who is an acute skilled patient being seen and evaluated for weakness    HPI: Recently admitted to this facility for rehabilitation. Good historian.   Denies any chest pain or tightness.  Just finished with therapy session; he reports that his goal is to regain strength so that he can transfer to acute rehabilitation program.  Reviewed medication and blood sugar levels.   Has good appetite       HPI      Objective  Vital signs: 124/64-66-18-97.4     Physical Exam  Vitals and nursing note reviewed.   Constitutional:       General: He is not in acute distress.     Appearance: He is well-groomed.   Eyes:      Extraocular Movements: Extraocular movements intact.   Cardiovascular:      Rate and Rhythm: Normal rate and regular rhythm.   Pulmonary:      Effort: Pulmonary effort is normal.      Breath sounds: Normal breath sounds.      Comments: Lungs clear   Abdominal:      General: Bowel sounds are normal.      Palpations: Abdomen is soft.   Musculoskeletal:      Cervical back: Neck supple.      Right lower leg: No edema.      Left lower leg: No edema.      Comments: Hemiparesis - left upper arm   Neurological:      Mental Status: He is alert.   Psychiatric:         Mood and Affect: Mood normal.         Behavior: Behavior is cooperative.         Assessment/Plan  Problem List Items Addressed This Visit       Hyperlipemia     Atorvastatin          Diabetes mellitus with kidney disease (Multi)     Insulin lispro - sliding scale   Boost supplements         Hypertension     Amlodipine   Monitor BP    Monitor labs    BP at goal          Weakness - Primary     Requires rehabilitation   Is motivated to regain strength - goal to transfer to acute rehabilitation    Monitor progress          Other sequelae of other nontraumatic intracranial hemorrhage     Requires rehabilitation     Baclofen - muscle spacticity   Left hemiparesis          Medications, treatments, and labs reviewed  Continue medications and treatments as listed in EMR    MABEL Fernandes      Electronically Signed By: MABEL Fernandes   7/28/24  9:15 AM

## 2024-07-27 NOTE — PROGRESS NOTES
PROGRESS NOTE    Subjective   Chief complaint: Jordon Waddell is a 73 y.o. male who is an acute skilled patient being seen and evaluated for weakness    HPI: Recently admitted to this facility for rehabilitation. Good historian.   Denies any chest pain or tightness.  Just finished with therapy session; he reports that his goal is to regain strength so that he can transfer to acute rehabilitation program.  Reviewed medication and blood sugar levels.   Has good appetite       HPI      Objective   Vital signs: 124/64-66-18-97.4     Physical Exam  Vitals and nursing note reviewed.   Constitutional:       General: He is not in acute distress.     Appearance: He is well-groomed.   Eyes:      Extraocular Movements: Extraocular movements intact.   Cardiovascular:      Rate and Rhythm: Normal rate and regular rhythm.   Pulmonary:      Effort: Pulmonary effort is normal.      Breath sounds: Normal breath sounds.      Comments: Lungs clear   Abdominal:      General: Bowel sounds are normal.      Palpations: Abdomen is soft.   Musculoskeletal:      Cervical back: Neck supple.      Right lower leg: No edema.      Left lower leg: No edema.      Comments: Hemiparesis - left upper arm   Neurological:      Mental Status: He is alert.   Psychiatric:         Mood and Affect: Mood normal.         Behavior: Behavior is cooperative.         Assessment/Plan   Problem List Items Addressed This Visit       Hyperlipemia     Atorvastatin          Diabetes mellitus with kidney disease (Multi)     Insulin lispro - sliding scale   Boost supplements         Hypertension     Amlodipine   Monitor BP    Monitor labs    BP at goal          Weakness - Primary     Requires rehabilitation   Is motivated to regain strength - goal to transfer to acute rehabilitation    Monitor progress          Other sequelae of other nontraumatic intracranial hemorrhage     Requires rehabilitation    Baclofen - muscle spacticity   Left hemiparesis          Medications,  treatments, and labs reviewed  Continue medications and treatments as listed in EMR    Perla Powell, APRN-CNP

## 2024-07-28 PROBLEM — R53.1 WEAKNESS: Status: ACTIVE | Noted: 2024-07-28

## 2024-07-28 PROBLEM — I10 BENIGN SYSTOLIC HYPERTENSION: Status: RESOLVED | Noted: 2023-06-01 | Resolved: 2024-07-28

## 2024-07-28 PROBLEM — I69.298: Status: ACTIVE | Noted: 2024-07-28

## 2024-07-28 NOTE — ASSESSMENT & PLAN NOTE
Requires rehabilitation   Is motivated to regain strength - goal to transfer to acute rehabilitation    Monitor progress

## 2024-07-29 ENCOUNTER — NURSING HOME VISIT (OUTPATIENT)
Dept: POST ACUTE CARE | Facility: EXTERNAL LOCATION | Age: 74
End: 2024-07-29
Payer: MEDICARE

## 2024-07-29 DIAGNOSIS — R53.1 WEAKNESS: ICD-10-CM

## 2024-07-29 DIAGNOSIS — E78.49 OTHER HYPERLIPIDEMIA: ICD-10-CM

## 2024-07-29 DIAGNOSIS — E11.21 DIABETES MELLITUS WITH KIDNEY DISEASE (MULTI): ICD-10-CM

## 2024-07-29 DIAGNOSIS — I15.9 SECONDARY HYPERTENSION: ICD-10-CM

## 2024-07-29 DIAGNOSIS — N18.31 STAGE 3A CHRONIC KIDNEY DISEASE (MULTI): ICD-10-CM

## 2024-07-29 PROCEDURE — 99305 1ST NF CARE MODERATE MDM 35: CPT | Performed by: INTERNAL MEDICINE

## 2024-07-29 NOTE — PROGRESS NOTES
HISTORY & PHYSICAL    Subjective   Chief complaint: Jordon Waddell is a 73 y.o. male who is a acute skilled care patient being seen and evaluated for multiple medical problems.  Patient presents for follow up admission after brief hospitalization    HPI:  HPI  73-year-old male with past medical history of hypertension hyperlipidemia diabetes CKD 2, and SIGIFREDO was admitted to acute rehab after right thalamic ICH with current weakness in right upper and lower extremity.  Patient stated he was showering in the morning when he noticed he was not able to use his left arm.  Patient is right-handed.  He was also unable to support himself on his left leg.  Patient fell but was cognizant not to hit his head.  He was taken by EMS to [facility] and was noted to have an NIH score of 6.  Head CT showed right thalamic ICH.  Blood pressure was 175/86 and was brought down with labetalol and nicardipine drip.  Repeat head CT showed expansion of the ICH and the patient was transferred to [facility] for further management.  The patient had no complications while hospitalized.  Patient was evaluated by therapy and recommended for acute rehab.  Patient remained stable, transferred to [facility] for acute rehab where he   Continued therapy.   Patient was then transferred to the  Current facility to continue medical management and therapy.    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       Past Surgical History:   Procedure Laterality Date     CARPAL TUNNEL RELEASE Bilateral 2000    CYST REMOVAL  1970    TONSILLECTOMY  1957    TOTAL HIP ARTHROPLASTY Left        Family History   Problem Relation Name Age of Onset    Cancer Mother Marcela     Stroke Mother Marcela     Hypertension Mother Marcela     Hypertension Father Baltazar     Diabetes Maternal Grandfather Roger clark        Social History     Socioeconomic History    Marital status:    Tobacco Use    Smoking status: Former     Current packs/day: 0.00     Average packs/day: 1 pack/day for 5.0 years (5.0 ttl pk-yrs)     Types: Cigarettes     Start date: 1980     Quit date: 1985     Years since quittin.6     Passive exposure: Never    Smokeless tobacco: Never   Vaping Use    Vaping status: Never Used   Substance and Sexual Activity    Alcohol use: Not Currently     Alcohol/week: 1.0 standard drink of alcohol     Types: 1 Cans of beer per week    Drug use: Never    Sexual activity: Defer     Partners: Female     Social Determinants of Health     Financial Resource Strain: Low Risk  (7/3/2024)    Overall Financial Resource Strain (CARDIA)     Difficulty of Paying Living Expenses: Not hard at all   Transportation Needs: No Transportation Needs (7/3/2024)    PRAPARE - Transportation     Lack of Transportation (Medical): No     Lack of Transportation (Non-Medical): No   Housing Stability: Low Risk  (7/3/2024)    Housing Stability Vital Sign     Unable to Pay for Housing in the Last Year: No     Number of Places Lived in the Last Year: 1     Unstable Housing in the Last Year: No       Vital signs: 122/60, 97.3. 66. 18, 97%,     Objective   Physical Exam  Constitutional:       General: He is not in acute distress.  Eyes:      Extraocular Movements: Extraocular movements intact.   Cardiovascular:      Rate and Rhythm: Normal rate and regular rhythm.   Pulmonary:      Effort: Pulmonary effort is normal.      Breath sounds: Normal breath sounds.   Abdominal:      General: Bowel sounds are  normal.      Palpations: Abdomen is soft.   Musculoskeletal:      Cervical back: Neck supple.      Right lower leg: No edema.      Left lower leg: No edema.   Neurological:      Mental Status: He is alert.   Psychiatric:         Mood and Affect: Mood normal.         Behavior: Behavior is cooperative.         Assessment/Plan   Problem List Items Addressed This Visit       Hyperlipemia     Atorvastatin   Monitor labs         Stage 3a chronic kidney disease (Multi)     Check labs avoid nephrotoxic meds           Diabetes mellitus with kidney disease (Multi)     Insulin lispro - sliding scale   Boost supplements         Hypertension     Amlodipine   Monitor BP    Monitor labs    BP at goal          Weakness     Requires rehabilitation   Is motivated to regain strength - goal to transfer to acute rehabilitation    Monitor progress           Medications, treatments, and labs reviewed  Continue medications and treatments as listed in PCC    Scribe Attestation  I, Catrachita Higginsibchichi   attest that this documentation has been prepared under the direction and in the presence of Maggie Whipple MD.    Provider Attestation - Scribe documentation  All medical record entries made by the Scribe were at my direction and personally dictated by me. I have reviewed the chart and agree that the record accurately reflects my personal performance of the history, physical exam, discussion and plan.  Maggie Whipple MD

## 2024-07-29 NOTE — LETTER
Patient: Jordon Waddell  : 1950    Encounter Date: 2024    HISTORY & PHYSICAL    Subjective  Chief complaint: Jordon Waddell is a 73 y.o. male who is a acute skilled care patient being seen and evaluated for multiple medical problems.  Patient presents for follow up admission after brief hospitalization    HPI:  HPI  73-year-old male with past medical history of hypertension hyperlipidemia diabetes CKD 2, and SIGIFREDO was admitted to acute rehab after right thalamic ICH with current weakness in right upper and lower extremity.  Patient stated he was showering in the morning when he noticed he was not able to use his left arm.  Patient is right-handed.  He was also unable to support himself on his left leg.  Patient fell but was cognizant not to hit his head.  He was taken by EMS to [facility] and was noted to have an NIH score of 6.  Head CT showed right thalamic ICH.  Blood pressure was 175/86 and was brought down with labetalol and nicardipine drip.  Repeat head CT showed expansion of the ICH and the patient was transferred to [facility] for further management.  The patient had no complications while hospitalized.  Patient was evaluated by therapy and recommended for acute rehab.  Patient remained stable, transferred to [facility] for acute rehab where he   Continued therapy.   Patient was then transferred to the  Current facility to continue medical management and therapy.    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       Past Surgical History:   Procedure Laterality Date   • CARPAL TUNNEL RELEASE Bilateral    • CYST REMOVAL     • TONSILLECTOMY     • TOTAL HIP ARTHROPLASTY Left        Family History   Problem Relation Name Age of Onset   • Cancer Mother Marcela    • Stroke Mother Marcela    • Hypertension Mother Marcela    • Hypertension Father Baltazar    • Diabetes Maternal Grandfather Roger clark        Social History     Socioeconomic History   • Marital status:    Tobacco Use   • Smoking status: Former     Current packs/day: 0.00     Average packs/day: 1 pack/day for 5.0 years (5.0 ttl pk-yrs)     Types: Cigarettes     Start date: 1980     Quit date: 1985     Years since quittin.6     Passive exposure: Never   • Smokeless tobacco: Never   Vaping Use   • Vaping status: Never Used   Substance and Sexual Activity   • Alcohol use: Not Currently     Alcohol/week: 1.0 standard drink of alcohol     Types: 1 Cans of beer per week   • Drug use: Never   • Sexual activity: Defer     Partners: Female     Social Determinants of Health     Financial Resource Strain: Low Risk  (7/3/2024)    Overall Financial Resource Strain (CARDIA)    • Difficulty of Paying Living Expenses: Not hard at all   Transportation Needs: No Transportation Needs (7/3/2024)    PRAPARE - Transportation    • Lack of Transportation (Medical): No    • Lack of Transportation (Non-Medical): No   Housing Stability: Low Risk  (7/3/2024)    Housing Stability Vital Sign    • Unable to Pay for Housing in the Last Year: No    • Number of Places Lived in the Last Year: 1    • Unstable Housing in the Last Year: No       Vital signs: 122/60, 97.3. 66. 18, 97%,     Objective  Physical Exam  Constitutional:       General: He is not in acute distress.  Eyes:      Extraocular Movements: Extraocular movements intact.   Cardiovascular:      Rate and Rhythm: Normal rate and regular rhythm.   Pulmonary:      Effort: Pulmonary  effort is normal.      Breath sounds: Normal breath sounds.   Abdominal:      General: Bowel sounds are normal.      Palpations: Abdomen is soft.   Musculoskeletal:      Cervical back: Neck supple.      Right lower leg: No edema.      Left lower leg: No edema.   Neurological:      Mental Status: He is alert.   Psychiatric:         Mood and Affect: Mood normal.         Behavior: Behavior is cooperative.         Assessment/Plan  Problem List Items Addressed This Visit       Hyperlipemia     Atorvastatin   Monitor labs         Stage 3a chronic kidney disease (Multi)     Check labs avoid nephrotoxic meds           Diabetes mellitus with kidney disease (Multi)     Insulin lispro - sliding scale   Boost supplements         Hypertension     Amlodipine   Monitor BP    Monitor labs    BP at goal          Weakness     Requires rehabilitation   Is motivated to regain strength - goal to transfer to acute rehabilitation    Monitor progress           Medications, treatments, and labs reviewed  Continue medications and treatments as listed in PCC    Scribe Attestation  IVirginia Scribchichi   attest that this documentation has been prepared under the direction and in the presence of Maggie Whipple MD.    Provider Attestation - Scribe documentation  All medical record entries made by the Scribe were at my direction and personally dictated by me. I have reviewed the chart and agree that the record accurately reflects my personal performance of the history, physical exam, discussion and plan.  Maggie Whipple MD        Electronically Signed By: Maggie Whipple MD   7/30/24  3:17 PM   Schizo-affective schizophrenia

## 2024-08-01 ENCOUNTER — NURSING HOME VISIT (OUTPATIENT)
Dept: POST ACUTE CARE | Facility: EXTERNAL LOCATION | Age: 74
End: 2024-08-01
Payer: MEDICARE

## 2024-08-01 DIAGNOSIS — I69.298 OTHER SEQUELAE OF OTHER NONTRAUMATIC INTRACRANIAL HEMORRHAGE: ICD-10-CM

## 2024-08-01 DIAGNOSIS — E11.21 DIABETES MELLITUS WITH KIDNEY DISEASE (MULTI): ICD-10-CM

## 2024-08-01 DIAGNOSIS — R53.1 WEAKNESS: ICD-10-CM

## 2024-08-01 DIAGNOSIS — I15.9 SECONDARY HYPERTENSION: Primary | ICD-10-CM

## 2024-08-01 PROCEDURE — 99309 SBSQ NF CARE MODERATE MDM 30: CPT | Performed by: INTERNAL MEDICINE

## 2024-08-01 NOTE — LETTER
Patient: Jordon Waddell  : 1950    Encounter Date: 2024    PROGRESS NOTE    Subjective  Chief complaint: Jordon Waddell is a 73 y.o. male who is an acute skilled patient being seen and evaluated for weakness    HPI:  HPI  Patient is currently working in therapy due to generalized weakness.  Patient is working on skilled interventions focusing on bed mobility training to increase functional skills focusing on training and rolling scooting bridging to facilitate independence in bed mobility.  Patient is also working on transferring, sit to stand and stand to sit from raised bed to wheelchair.  Patient was seen and examined at the bedside, appears to be in no acute distress.  Nursing staff voicing no new concerns    Objective  Vital signs: 122/60, 98.6, 66, 18, 98%, blood sugar 219    Physical Exam  Constitutional:       General: He is not in acute distress.  Eyes:      Extraocular Movements: Extraocular movements intact.   Cardiovascular:      Rate and Rhythm: Normal rate and regular rhythm.   Pulmonary:      Effort: Pulmonary effort is normal.      Breath sounds: Normal breath sounds.   Abdominal:      General: Bowel sounds are normal.      Palpations: Abdomen is soft.   Musculoskeletal:      Cervical back: Neck supple.      Right lower leg: No edema.      Left lower leg: No edema.   Neurological:      Mental Status: He is alert.   Psychiatric:         Mood and Affect: Mood normal.         Behavior: Behavior is cooperative.         Assessment/Plan  Problem List Items Addressed This Visit       Diabetes mellitus with kidney disease (Multi)     Insulin lispro - sliding scale   Glucoscans  Monitor labs  Avoid nephrotoxins         Hypertension - Primary     Amlodipine   Monitor BP    Monitor labs    BP at goal          Weakness     Continue to work in therapy towards goals         Other sequelae of other nontraumatic intracranial hemorrhage     Requires rehabilitation    Baclofen - muscle spacticity   Left  hemiparesis          Medications, treatments, and labs reviewed  Continue medications and treatments as listed in EMR      Scribe Attestation  I, Destin Hylton   attest that this documentation has been prepared under the direction and in the presence of Maggie Whipple MD    Provider Attestation - Scribe documentation  All medical record entries made by the Scribe were at my direction and personally dictated by me. I have reviewed the chart and agree that the record accurately reflects my personal performance of the history, physical exam, discussion and plan.   Maggie Whipple MD        Electronically Signed By: Maggie Whipple MD   8/1/24  2:10 PM

## 2024-08-01 NOTE — PROGRESS NOTES
PROGRESS NOTE    Subjective   Chief complaint: Jordon Waddell is a 73 y.o. male who is an acute skilled patient being seen and evaluated for weakness    HPI:  HPI  Patient is currently working in therapy due to generalized weakness.  Patient is working on skilled interventions focusing on bed mobility training to increase functional skills focusing on training and rolling scooting bridging to facilitate independence in bed mobility.  Patient is also working on transferring, sit to stand and stand to sit from raised bed to wheelchair.  Patient was seen and examined at the bedside, appears to be in no acute distress.  Nursing staff voicing no new concerns    Objective   Vital signs: 122/60, 98.6, 66, 18, 98%, blood sugar 219    Physical Exam  Constitutional:       General: He is not in acute distress.  Eyes:      Extraocular Movements: Extraocular movements intact.   Cardiovascular:      Rate and Rhythm: Normal rate and regular rhythm.   Pulmonary:      Effort: Pulmonary effort is normal.      Breath sounds: Normal breath sounds.   Abdominal:      General: Bowel sounds are normal.      Palpations: Abdomen is soft.   Musculoskeletal:      Cervical back: Neck supple.      Right lower leg: No edema.      Left lower leg: No edema.   Neurological:      Mental Status: He is alert.   Psychiatric:         Mood and Affect: Mood normal.         Behavior: Behavior is cooperative.         Assessment/Plan   Problem List Items Addressed This Visit       Diabetes mellitus with kidney disease (Multi)     Insulin lispro - sliding scale   Glucoscans  Monitor labs  Avoid nephrotoxins         Hypertension - Primary     Amlodipine   Monitor BP    Monitor labs    BP at goal          Weakness     Continue to work in therapy towards goals         Other sequelae of other nontraumatic intracranial hemorrhage     Requires rehabilitation    Baclofen - muscle spacticity   Left hemiparesis          Medications, treatments, and labs  reviewed  Continue medications and treatments as listed in EMR      Scribe Attestation  I, Destin Hylton   attest that this documentation has been prepared under the direction and in the presence of Maggie Whipple MD    Provider Attestation - Scribe documentation  All medical record entries made by the Scribe were at my direction and personally dictated by me. I have reviewed the chart and agree that the record accurately reflects my personal performance of the history, physical exam, discussion and plan.   Maggie Whipple MD

## 2024-08-02 ENCOUNTER — NURSING HOME VISIT (OUTPATIENT)
Dept: POST ACUTE CARE | Facility: EXTERNAL LOCATION | Age: 74
End: 2024-08-02
Payer: MEDICARE

## 2024-08-02 DIAGNOSIS — N18.31 STAGE 3A CHRONIC KIDNEY DISEASE (MULTI): ICD-10-CM

## 2024-08-02 DIAGNOSIS — R53.1 WEAKNESS: Primary | ICD-10-CM

## 2024-08-02 DIAGNOSIS — I69.298 OTHER SEQUELAE OF OTHER NONTRAUMATIC INTRACRANIAL HEMORRHAGE: ICD-10-CM

## 2024-08-02 PROCEDURE — 99309 SBSQ NF CARE MODERATE MDM 30: CPT | Performed by: NURSE PRACTITIONER

## 2024-08-02 NOTE — LETTER
Patient: Jordon Waddell  : 1950    Encounter Date: 2024    PROGRESS NOTE    Subjective  Chief complaint: Jordon Waddell is a 73 y.o. male who is an acute skilled patient being seen and evaluated for weakness    HPI: is visiting with his wife. He reports that he is making good progress in therapy and was able to take a few steps. Is wearing sling to support his left arm; remains with dependent edema in left hand.   His wife reports that he looks stronger .   Has good appetite. Denies any pain or discomfort.   HPI      Objective  Vital signs: 128/65-72-18-98.4    Physical Exam  Vitals and nursing note reviewed.   Constitutional:       General: He is not in acute distress.     Appearance: He is well-groomed.   Eyes:      Extraocular Movements: Extraocular movements intact.   Cardiovascular:      Rate and Rhythm: Normal rate and regular rhythm.   Pulmonary:      Effort: Pulmonary effort is normal.      Breath sounds: Normal breath sounds.      Comments: Lungs clear   Abdominal:      General: Bowel sounds are normal.      Palpations: Abdomen is soft.   Musculoskeletal:      Cervical back: Neck supple.      Right lower leg: No edema.      Left lower leg: No edema.      Comments: Hemiparesis - left upper arm  Left hand with dependent edema    Wearing sling    Neurological:      Mental Status: He is alert.   Psychiatric:         Mood and Affect: Mood normal.         Behavior: Behavior is cooperative.         Assessment/Plan  Problem List Items Addressed This Visit       Stage 3a chronic kidney disease (Multi)     Check labs avoid nephrotoxic meds           Weakness - Primary     Continue to work in therapy towards goals  Reports that he is making progress and feeling stronger          Other sequelae of other nontraumatic intracranial hemorrhage     Requires rehabilitation    Baclofen - muscle spacticity   Left hemiparesis, wearing sling left arm           Medications, treatments, and labs reviewed  Continue  medications and treatments as listed in EMR    MABEL Fernandes      Electronically Signed By: MABEL Fernandes   8/4/24  5:46 PM

## 2024-08-03 NOTE — PROGRESS NOTES
PROGRESS NOTE    Subjective   Chief complaint: Jordon Waddell is a 73 y.o. male who is an acute skilled patient being seen and evaluated for weakness    HPI: is visiting with his wife. He reports that he is making good progress in therapy and was able to take a few steps. Is wearing sling to support his left arm; remains with dependent edema in left hand.   His wife reports that he looks stronger .   Has good appetite. Denies any pain or discomfort.   HPI      Objective   Vital signs: 128/65-72-18-98.4    Physical Exam  Vitals and nursing note reviewed.   Constitutional:       General: He is not in acute distress.     Appearance: He is well-groomed.   Eyes:      Extraocular Movements: Extraocular movements intact.   Cardiovascular:      Rate and Rhythm: Normal rate and regular rhythm.   Pulmonary:      Effort: Pulmonary effort is normal.      Breath sounds: Normal breath sounds.      Comments: Lungs clear   Abdominal:      General: Bowel sounds are normal.      Palpations: Abdomen is soft.   Musculoskeletal:      Cervical back: Neck supple.      Right lower leg: No edema.      Left lower leg: No edema.      Comments: Hemiparesis - left upper arm  Left hand with dependent edema    Wearing sling    Neurological:      Mental Status: He is alert.   Psychiatric:         Mood and Affect: Mood normal.         Behavior: Behavior is cooperative.         Assessment/Plan   Problem List Items Addressed This Visit       Stage 3a chronic kidney disease (Multi)     Check labs avoid nephrotoxic meds           Weakness - Primary     Continue to work in therapy towards goals  Reports that he is making progress and feeling stronger          Other sequelae of other nontraumatic intracranial hemorrhage     Requires rehabilitation    Baclofen - muscle spacticity   Left hemiparesis, wearing sling left arm           Medications, treatments, and labs reviewed  Continue medications and treatments as listed in EMR    Perla Powell,  APRN-CNP

## 2024-08-04 PROBLEM — E11.9 DIABETES MELLITUS, TYPE 2 (MULTI): Status: RESOLVED | Noted: 2023-12-18 | Resolved: 2024-08-04

## 2024-08-04 NOTE — ASSESSMENT & PLAN NOTE
Requires rehabilitation    Baclofen - muscle spacticity   Left hemiparesis, wearing sling left arm

## 2024-08-04 NOTE — ASSESSMENT & PLAN NOTE
Continue to work in therapy towards goals  Reports that he is making progress and feeling stronger

## 2024-08-05 ENCOUNTER — NURSING HOME VISIT (OUTPATIENT)
Dept: POST ACUTE CARE | Facility: EXTERNAL LOCATION | Age: 74
End: 2024-08-05
Payer: MEDICARE

## 2024-08-05 DIAGNOSIS — E11.21 DIABETES MELLITUS WITH KIDNEY DISEASE (MULTI): ICD-10-CM

## 2024-08-05 DIAGNOSIS — I69.298 OTHER SEQUELAE OF OTHER NONTRAUMATIC INTRACRANIAL HEMORRHAGE: ICD-10-CM

## 2024-08-05 DIAGNOSIS — R53.1 WEAKNESS: ICD-10-CM

## 2024-08-05 DIAGNOSIS — I15.9 SECONDARY HYPERTENSION: ICD-10-CM

## 2024-08-05 PROCEDURE — 99308 SBSQ NF CARE LOW MDM 20: CPT | Performed by: INTERNAL MEDICINE

## 2024-08-05 NOTE — PROGRESS NOTES
PROGRESS NOTE    Subjective   Chief complaint: Jordon Waddell is a 73 y.o. male who is an acute skilled patient being seen and evaluated for weakness    HPI:  Therapy has been working with the patient to improve strength and endurance with ADLs, transfers, and mobility.  Patient continues to work toward goals. Patient performed bed mobility rolling x multiple trials, supine<>sit, bridging, glute bridges x10 reps to develop core/trunk strength ffor functional tasks. Patient required maxA for all bed mobility. Patient ariel 40 min LUE neuro rafiq with focus on sensory diet including tempatore change, ROM, and tactile input shoulder distally with AAROM and PROM to promote increase activiation of LUE. E Patient is stable and has no new complaints.  Nursing staff voices no new concerns today.      Objective   Vital signs: 122/60,66,96%,     Physical Exam  Constitutional:       General: He is not in acute distress.  Eyes:      Extraocular Movements: Extraocular movements intact.   Cardiovascular:      Rate and Rhythm: Normal rate and regular rhythm.   Pulmonary:      Effort: Pulmonary effort is normal.      Breath sounds: Normal breath sounds.   Abdominal:      General: Bowel sounds are normal.      Palpations: Abdomen is soft.   Musculoskeletal:      Cervical back: Neck supple.      Right lower leg: No edema.      Left lower leg: No edema.   Neurological:      Mental Status: He is alert.   Psychiatric:         Mood and Affect: Mood normal.         Behavior: Behavior is cooperative.         Assessment/Plan   Problem List Items Addressed This Visit       Diabetes mellitus with kidney disease (Multi)     Insulin lispro - sliding scale   Glucoscans  Monitor labs  Avoid nephrotoxins         Hypertension     Amlodipine   Monitor BP    Monitor labs    BP at goal          Weakness     Continue to work in therapy towards goals           Other sequelae of other nontraumatic intracranial hemorrhage     Requires rehabilitation     Baclofen - muscle spacticity   Left hemiparesis, wearing sling left arm           Medications, treatments, and labs reviewed  Continue medications and treatments as listed in EMR    Scribe Attestation  I, Destin Medina   attest that this documentation has been prepared under the direction and in the presence of Maggie Whipple MD.     Provider Attestation - Scribe documentation  All medical record entries made by the Scribe were at my direction and personally dictated by me. I have reviewed the chart and agree that the record accurately reflects my personal performance of the history, physical exam, discussion and plan.   Maggie Whipple MD

## 2024-08-05 NOTE — LETTER
Patient: Jordon Waddell  : 1950    Encounter Date: 2024    PROGRESS NOTE    Subjective  Chief complaint: Jordon Waddell is a 73 y.o. male who is an acute skilled patient being seen and evaluated for weakness    HPI:  Therapy has been working with the patient to improve strength and endurance with ADLs, transfers, and mobility.  Patient continues to work toward goals. Patient performed bed mobility rolling x multiple trials, supine<>sit, bridging, glute bridges x10 reps to develop core/trunk strength ffor functional tasks. Patient required maxA for all bed mobility. Patient ariel 40 min LUE neuro rafiq with focus on sensory diet including tempatore change, ROM, and tactile input shoulder distally with AAROM and PROM to promote increase activiation of LUE. E Patient is stable and has no new complaints.  Nursing staff voices no new concerns today.      Objective  Vital signs: 122/60,66,96%,     Physical Exam  Constitutional:       General: He is not in acute distress.  Eyes:      Extraocular Movements: Extraocular movements intact.   Cardiovascular:      Rate and Rhythm: Normal rate and regular rhythm.   Pulmonary:      Effort: Pulmonary effort is normal.      Breath sounds: Normal breath sounds.   Abdominal:      General: Bowel sounds are normal.      Palpations: Abdomen is soft.   Musculoskeletal:      Cervical back: Neck supple.      Right lower leg: No edema.      Left lower leg: No edema.   Neurological:      Mental Status: He is alert.   Psychiatric:         Mood and Affect: Mood normal.         Behavior: Behavior is cooperative.         Assessment/Plan  Problem List Items Addressed This Visit       Diabetes mellitus with kidney disease (Multi)     Insulin lispro - sliding scale   Glucoscans  Monitor labs  Avoid nephrotoxins         Hypertension     Amlodipine   Monitor BP    Monitor labs    BP at goal          Weakness     Continue to work in therapy towards goals           Other sequelae of  other nontraumatic intracranial hemorrhage     Requires rehabilitation    Baclofen - muscle spacticity   Left hemiparesis, wearing sling left arm           Medications, treatments, and labs reviewed  Continue medications and treatments as listed in EMR    Scribe Attestation  Linda TOSCANO Scribe   attest that this documentation has been prepared under the direction and in the presence of Maggie Whipple MD.     Provider Attestation - Scribe documentation  All medical record entries made by the Scribe were at my direction and personally dictated by me. I have reviewed the chart and agree that the record accurately reflects my personal performance of the history, physical exam, discussion and plan.   Maggie Whipple MD      Electronically Signed By: Maggie Whipple MD   8/5/24  2:47 PM

## 2024-08-06 ENCOUNTER — NURSING HOME VISIT (OUTPATIENT)
Dept: POST ACUTE CARE | Facility: EXTERNAL LOCATION | Age: 74
End: 2024-08-06
Payer: MEDICARE

## 2024-08-06 DIAGNOSIS — R53.1 WEAKNESS: ICD-10-CM

## 2024-08-06 DIAGNOSIS — I15.9 SECONDARY HYPERTENSION: Primary | ICD-10-CM

## 2024-08-06 DIAGNOSIS — I69.298 OTHER SEQUELAE OF OTHER NONTRAUMATIC INTRACRANIAL HEMORRHAGE: ICD-10-CM

## 2024-08-06 DIAGNOSIS — E78.49 OTHER HYPERLIPIDEMIA: ICD-10-CM

## 2024-08-06 PROCEDURE — 99309 SBSQ NF CARE MODERATE MDM 30: CPT | Performed by: NURSE PRACTITIONER

## 2024-08-06 NOTE — LETTER
Patient: Jordon Waddell  : 1950    Encounter Date: 2024    PROGRESS NOTE    Subjective  Chief complaint: Jordon Waddell is a 73 y.o. male who is an acute skilled patient being seen and evaluated for weakness    HPI:He reports that he is doing well in therapy. Is wearing sling left arm, edema has improved in his hand. He reports that his wife has ordered  a compression glove.   He is interested in his current lab report- reviewed his labs and medications with him.   The therapist reports that he is a hard worker and continues to make progress.   Reviewed medication and current blood sugars.     HPI      Objective  Vital signs: 128/63-78-19-98.4    Physical Exam  Vitals and nursing note reviewed.   Constitutional:       General: He is not in acute distress.     Appearance: He is well-groomed.   Eyes:      Extraocular Movements: Extraocular movements intact.   Cardiovascular:      Rate and Rhythm: Normal rate and regular rhythm.   Pulmonary:      Effort: Pulmonary effort is normal.      Breath sounds: Normal breath sounds.      Comments: Lungs clear   Abdominal:      General: Bowel sounds are normal.      Palpations: Abdomen is soft.   Musculoskeletal:      Cervical back: Neck supple.      Right lower leg: No edema.      Left lower leg: No edema.      Comments: Hemiparesis - left upper arm  Left hand with dependent edema    Wearing sling    Neurological:      Mental Status: He is alert.   Psychiatric:         Mood and Affect: Mood normal.         Behavior: Behavior is cooperative.         Assessment/Plan  Problem List Items Addressed This Visit       Hyperlipemia     Atorvastatin   Monitor labs          Hypertension - Primary     Amlodipine   Monitor BP    Monitor labs    BP at goal          Weakness     Continue to work in therapy towards goals  Reports that he is making progress and feeling stronger   Is motivated to regain strength and function              Other sequelae of other nontraumatic  intracranial hemorrhage     Requires rehabilitation    Baclofen - muscle spacticity   Left hemiparesis, wearing sling left arm   Was started on fluoxetine per rehabilitation physician - post stroke motor recovery              Medications, treatments, and labs reviewed  Continue medications and treatments as listed in EMR    MABEL Fernandes      Electronically Signed By: MABEL Fernandes   8/9/24 10:15 PM

## 2024-08-08 ENCOUNTER — NURSING HOME VISIT (OUTPATIENT)
Dept: POST ACUTE CARE | Facility: EXTERNAL LOCATION | Age: 74
End: 2024-08-08
Payer: MEDICARE

## 2024-08-08 DIAGNOSIS — I15.9 SECONDARY HYPERTENSION: ICD-10-CM

## 2024-08-08 DIAGNOSIS — R53.1 WEAKNESS: ICD-10-CM

## 2024-08-08 DIAGNOSIS — E11.21 DIABETES MELLITUS WITH KIDNEY DISEASE (MULTI): ICD-10-CM

## 2024-08-08 DIAGNOSIS — E78.49 OTHER HYPERLIPIDEMIA: ICD-10-CM

## 2024-08-08 DIAGNOSIS — I69.298 OTHER SEQUELAE OF OTHER NONTRAUMATIC INTRACRANIAL HEMORRHAGE: Primary | ICD-10-CM

## 2024-08-08 PROCEDURE — 99309 SBSQ NF CARE MODERATE MDM 30: CPT | Performed by: INTERNAL MEDICINE

## 2024-08-08 NOTE — LETTER
Patient: Jordon Waddell  : 1950    Encounter Date: 2024    PROGRESS NOTE    Subjective  Chief complaint: Jordon Waddell is a 73 y.o. male who is an acute skilled patient being seen and evaluated for weakness and monthly general medical care and follow-up.    HPI:  HPI  Patient presents for general medical care and f/u.  Patient seen and examined at bedside.  No issues per nursing.  Patient has no acute complaints.  Patient is working with therapy.  Therapy is working with patient on standing balance.  DM, denies polydipsia polyuria polyphagia.  HTN BP at goal.  Denies chest pain and headache.  CKD denies decreased appetite or change in urinary frequency.  HLD denies muscle aches.  Mentation at baseline, no acute distress.    Objective  Vital signs: 122/60, 97.0, 66, 18, 95%, blood sugar 238    Physical Exam  Constitutional:       General: He is not in acute distress.  Eyes:      Extraocular Movements: Extraocular movements intact.   Cardiovascular:      Rate and Rhythm: Normal rate and regular rhythm.   Pulmonary:      Effort: Pulmonary effort is normal.      Breath sounds: Normal breath sounds.   Abdominal:      General: Bowel sounds are normal.      Palpations: Abdomen is soft.   Musculoskeletal:      Cervical back: Neck supple.      Right lower leg: No edema.      Left lower leg: No edema.   Neurological:      Mental Status: He is alert.   Psychiatric:         Mood and Affect: Mood normal.         Behavior: Behavior is cooperative.         Assessment/Plan  Problem List Items Addressed This Visit       Hyperlipemia     Atorvastatin   Monitor labs         Diabetes mellitus with kidney disease (Multi)     Insulin lispro - sliding scale   Glucoscans  Monitor labs  Avoid nephrotoxins         Hypertension     Amlodipine   Monitor BP    Monitor labs    BP at goal          Weakness     Continue to work with therapy         Other sequelae of other nontraumatic intracranial hemorrhage - Primary     Requires  rehabilitation    Baclofen - muscle spacticity   Left hemiparesis          Medications, treatments, and labs reviewed  Continue medications and treatments as listed in EMR      Scribe Attestation  I, Destin Hylton   attest that this documentation has been prepared under the direction and in the presence of Maggie Whipple MD    Provider Attestation - Scribe documentation  All medical record entries made by the Scribe were at my direction and personally dictated by me. I have reviewed the chart and agree that the record accurately reflects my personal performance of the history, physical exam, discussion and plan.   Maggie Whipple MD        Electronically Signed By: Maggie Whipple MD   8/8/24  1:40 PM

## 2024-08-08 NOTE — PROGRESS NOTES
PROGRESS NOTE    Subjective   Chief complaint: Jordon Waddell is a 73 y.o. male who is an acute skilled patient being seen and evaluated for weakness and monthly general medical care and follow-up.    HPI:  HPI  Patient presents for general medical care and f/u.  Patient seen and examined at bedside.  No issues per nursing.  Patient has no acute complaints.  Patient is working with therapy.  Therapy is working with patient on standing balance.  DM, denies polydipsia polyuria polyphagia.  HTN BP at goal.  Denies chest pain and headache.  CKD denies decreased appetite or change in urinary frequency.  HLD denies muscle aches.  Mentation at baseline, no acute distress.    Objective   Vital signs: 122/60, 97.0, 66, 18, 95%, blood sugar 238    Physical Exam  Constitutional:       General: He is not in acute distress.  Eyes:      Extraocular Movements: Extraocular movements intact.   Cardiovascular:      Rate and Rhythm: Normal rate and regular rhythm.   Pulmonary:      Effort: Pulmonary effort is normal.      Breath sounds: Normal breath sounds.   Abdominal:      General: Bowel sounds are normal.      Palpations: Abdomen is soft.   Musculoskeletal:      Cervical back: Neck supple.      Right lower leg: No edema.      Left lower leg: No edema.   Neurological:      Mental Status: He is alert.   Psychiatric:         Mood and Affect: Mood normal.         Behavior: Behavior is cooperative.         Assessment/Plan   Problem List Items Addressed This Visit       Hyperlipemia     Atorvastatin   Monitor labs         Diabetes mellitus with kidney disease (Multi)     Insulin lispro - sliding scale   Glucoscans  Monitor labs  Avoid nephrotoxins         Hypertension     Amlodipine   Monitor BP    Monitor labs    BP at goal          Weakness     Continue to work with therapy         Other sequelae of other nontraumatic intracranial hemorrhage - Primary     Requires rehabilitation    Baclofen - muscle spacticity   Left hemiparesis           Medications, treatments, and labs reviewed  Continue medications and treatments as listed in EMR      Scribe Attestation  I, Destin Hylton   attest that this documentation has been prepared under the direction and in the presence of Maggie Whipple MD    Provider Attestation - Scribe documentation  All medical record entries made by the Scribe were at my direction and personally dictated by me. I have reviewed the chart and agree that the record accurately reflects my personal performance of the history, physical exam, discussion and plan.   Maggie Whipple MD

## 2024-08-09 ENCOUNTER — NURSING HOME VISIT (OUTPATIENT)
Dept: POST ACUTE CARE | Facility: EXTERNAL LOCATION | Age: 74
End: 2024-08-09
Payer: MEDICARE

## 2024-08-09 DIAGNOSIS — I69.298 OTHER SEQUELAE OF OTHER NONTRAUMATIC INTRACRANIAL HEMORRHAGE: ICD-10-CM

## 2024-08-09 DIAGNOSIS — R53.1 WEAKNESS: Primary | ICD-10-CM

## 2024-08-09 DIAGNOSIS — N18.31 STAGE 3A CHRONIC KIDNEY DISEASE (MULTI): ICD-10-CM

## 2024-08-09 DIAGNOSIS — I15.9 SECONDARY HYPERTENSION: ICD-10-CM

## 2024-08-09 DIAGNOSIS — E11.21 DIABETES MELLITUS WITH KIDNEY DISEASE (MULTI): ICD-10-CM

## 2024-08-09 PROBLEM — I62.9 INTRACRANIAL HEMORRHAGE (MULTI): Status: RESOLVED | Noted: 2024-06-26 | Resolved: 2024-08-09

## 2024-08-09 PROCEDURE — 99309 SBSQ NF CARE MODERATE MDM 30: CPT | Performed by: NURSE PRACTITIONER

## 2024-08-09 NOTE — LETTER
Patient: Jordon Waddell  : 1950    Encounter Date: 2024    PROGRESS NOTE    Subjective  Chief complaint: Jordon Waddell is a 73 y.o. male who is an acute skilled patient being seen and evaluated for weakness    HPI: is currently in the therapy gym. He reports he is trying to get extra therapy time. The therapist reports that he was able to walk short distance and is showing improved muscle strength.   The therapist further reports that his left arm remains dependent. Is wearing left arm sling.  HPI      Objective  Vital signs: 129/76-78-18-98.3     Physical Exam  Vitals and nursing note reviewed.   Constitutional:       General: He is not in acute distress.     Appearance: He is well-groomed.   Eyes:      Extraocular Movements: Extraocular movements intact.   Cardiovascular:      Rate and Rhythm: Normal rate and regular rhythm.   Pulmonary:      Effort: Pulmonary effort is normal.      Breath sounds: Normal breath sounds.      Comments: Lungs clear   Abdominal:      General: Bowel sounds are normal.      Palpations: Abdomen is soft.   Musculoskeletal:      Cervical back: Neck supple.      Right lower leg: No edema.      Left lower leg: No edema.      Comments: Hemiparesis - left upper arm  Left hand with dependent edema    Wearing sling    Neurological:      Mental Status: He is alert.   Psychiatric:         Mood and Affect: Mood normal.         Behavior: Behavior is cooperative.         Assessment/Plan  Problem List Items Addressed This Visit       Stage 3a chronic kidney disease (Multi)     Check labs avoid nephrotoxic meds           Diabetes mellitus with kidney disease (Multi)     Insulin lispro - sliding scale   Glucoscans  Monitor labs  Avoid nephrotoxins         Hypertension     Amlodipine   Monitor BP    Monitor labs    BP at goal          Weakness - Primary     Continue to work in therapy towards goals  Reports that he is making progress and feeling stronger   Is motivated to regain strength  and function              Other sequelae of other nontraumatic intracranial hemorrhage     Requires rehabilitation    Baclofen - muscle spacticity   Left hemiparesis, wearing sling left arm   Was started on fluoxetine per rehabilitation physician - post stroke motor recovery              Medications, treatments, and labs reviewed  Continue medications and treatments as listed in EMR    MABEL Fernandes      Electronically Signed By: MABEL Fernandes   8/11/24  6:11 PM

## 2024-08-10 NOTE — PROGRESS NOTES
PROGRESS NOTE    Subjective   Chief complaint: Jordon Waddell is a 73 y.o. male who is an acute skilled patient being seen and evaluated for weakness    HPI: is currently in the therapy gym. He reports he is trying to get extra therapy time. The therapist reports that he was able to walk short distance and is showing improved muscle strength.   The therapist further reports that his left arm remains dependent. Is wearing left arm sling.  HPI      Objective   Vital signs: 129/76-78-18-98.3     Physical Exam  Vitals and nursing note reviewed.   Constitutional:       General: He is not in acute distress.     Appearance: He is well-groomed.   Eyes:      Extraocular Movements: Extraocular movements intact.   Cardiovascular:      Rate and Rhythm: Normal rate and regular rhythm.   Pulmonary:      Effort: Pulmonary effort is normal.      Breath sounds: Normal breath sounds.      Comments: Lungs clear   Abdominal:      General: Bowel sounds are normal.      Palpations: Abdomen is soft.   Musculoskeletal:      Cervical back: Neck supple.      Right lower leg: No edema.      Left lower leg: No edema.      Comments: Hemiparesis - left upper arm  Left hand with dependent edema    Wearing sling    Neurological:      Mental Status: He is alert.   Psychiatric:         Mood and Affect: Mood normal.         Behavior: Behavior is cooperative.         Assessment/Plan   Problem List Items Addressed This Visit       Stage 3a chronic kidney disease (Multi)     Check labs avoid nephrotoxic meds           Diabetes mellitus with kidney disease (Multi)     Insulin lispro - sliding scale   Glucoscans  Monitor labs  Avoid nephrotoxins         Hypertension     Amlodipine   Monitor BP    Monitor labs    BP at goal          Weakness - Primary     Continue to work in therapy towards goals  Reports that he is making progress and feeling stronger   Is motivated to regain strength and function              Other sequelae of other nontraumatic  intracranial hemorrhage     Requires rehabilitation    Baclofen - muscle spacticity   Left hemiparesis, wearing sling left arm   Was started on fluoxetine per rehabilitation physician - post stroke motor recovery              Medications, treatments, and labs reviewed  Continue medications and treatments as listed in EMR    Perla Powell, JUAN-CNP

## 2024-08-10 NOTE — ASSESSMENT & PLAN NOTE
Continue to work in therapy towards goals  Reports that he is making progress and feeling stronger   Is motivated to regain strength and function

## 2024-08-10 NOTE — ASSESSMENT & PLAN NOTE
Requires rehabilitation    Baclofen - muscle spacticity   Left hemiparesis, wearing sling left arm   Was started on fluoxetine per rehabilitation physician - post stroke motor recovery

## 2024-08-10 NOTE — PROGRESS NOTES
PROGRESS NOTE    Subjective   Chief complaint: Jordon Waddell is a 73 y.o. male who is an acute skilled patient being seen and evaluated for weakness    HPI:He reports that he is doing well in therapy. Is wearing sling left arm, edema has improved in his hand. He reports that his wife has ordered  a compression glove.   He is interested in his current lab report- reviewed his labs and medications with him.   The therapist reports that he is a hard worker and continues to make progress.   Reviewed medication and current blood sugars.     HPI      Objective   Vital signs: 128/63-78-19-98.4    Physical Exam  Vitals and nursing note reviewed.   Constitutional:       General: He is not in acute distress.     Appearance: He is well-groomed.   Eyes:      Extraocular Movements: Extraocular movements intact.   Cardiovascular:      Rate and Rhythm: Normal rate and regular rhythm.   Pulmonary:      Effort: Pulmonary effort is normal.      Breath sounds: Normal breath sounds.      Comments: Lungs clear   Abdominal:      General: Bowel sounds are normal.      Palpations: Abdomen is soft.   Musculoskeletal:      Cervical back: Neck supple.      Right lower leg: No edema.      Left lower leg: No edema.      Comments: Hemiparesis - left upper arm  Left hand with dependent edema    Wearing sling    Neurological:      Mental Status: He is alert.   Psychiatric:         Mood and Affect: Mood normal.         Behavior: Behavior is cooperative.         Assessment/Plan   Problem List Items Addressed This Visit       Hyperlipemia     Atorvastatin   Monitor labs          Hypertension - Primary     Amlodipine   Monitor BP    Monitor labs    BP at goal          Weakness     Continue to work in therapy towards goals  Reports that he is making progress and feeling stronger   Is motivated to regain strength and function              Other sequelae of other nontraumatic intracranial hemorrhage     Requires rehabilitation    Baclofen - muscle  spacticity   Left hemiparesis, wearing sling left arm   Was started on fluoxetine per rehabilitation physician - post stroke motor recovery              Medications, treatments, and labs reviewed  Continue medications and treatments as listed in EMR    JUAN Fernandes-CNP

## 2024-08-12 ENCOUNTER — NURSING HOME VISIT (OUTPATIENT)
Dept: POST ACUTE CARE | Facility: EXTERNAL LOCATION | Age: 74
End: 2024-08-12
Payer: MEDICARE

## 2024-08-12 DIAGNOSIS — E11.21 DIABETES MELLITUS WITH KIDNEY DISEASE (MULTI): ICD-10-CM

## 2024-08-12 DIAGNOSIS — R53.1 WEAKNESS: ICD-10-CM

## 2024-08-12 DIAGNOSIS — I69.298 OTHER SEQUELAE OF OTHER NONTRAUMATIC INTRACRANIAL HEMORRHAGE: Primary | ICD-10-CM

## 2024-08-12 DIAGNOSIS — I15.9 SECONDARY HYPERTENSION: ICD-10-CM

## 2024-08-12 PROBLEM — J18.9 PNEUMONIA: Status: RESOLVED | Noted: 2024-02-21 | Resolved: 2024-08-12

## 2024-08-12 PROCEDURE — 99309 SBSQ NF CARE MODERATE MDM 30: CPT | Performed by: INTERNAL MEDICINE

## 2024-08-12 NOTE — LETTER
Patient: Jordon Waddell  : 1950    Encounter Date: 2024    PROGRESS NOTE    Subjective  Chief complaint: Jordon Waddell is a 73 y.o. male who is an acute skilled patient being seen and evaluated for weakness    HPI:  HPI  Patient is currently working in therapy due to generalized weakness.  Therapy is working with patient on ADLs.  Patient is performing upper body dressing with SBA.  Patient requiring set up for oral hygiene.  Therapy is also working patient on transfers, transferring from bed to wheelchair with mod assist x 2 and verbal cues.  Patient was seen and examined at bedside, appears to be in no acute distress.  Nursing staff voicing no new concerns.    Objective  Vital signs: 128/63, 98.2, 84, 18, 94%, blood sugar 218    Physical Exam  Constitutional:       General: He is not in acute distress.  Eyes:      Extraocular Movements: Extraocular movements intact.   Cardiovascular:      Rate and Rhythm: Normal rate and regular rhythm.   Pulmonary:      Effort: Pulmonary effort is normal.      Breath sounds: Normal breath sounds.   Abdominal:      General: Bowel sounds are normal.      Palpations: Abdomen is soft.   Musculoskeletal:      Cervical back: Neck supple.      Right lower leg: No edema.      Left lower leg: No edema.   Neurological:      Mental Status: He is alert.   Psychiatric:         Mood and Affect: Mood normal.         Behavior: Behavior is cooperative.         Assessment/Plan  Problem List Items Addressed This Visit       Diabetes mellitus with kidney disease (Multi)     Insulin lispro - sliding scale   Glucoscans  Monitor labs  Avoid nephrotoxins         Hypertension     Amlodipine   Monitor BP    Monitor labs            Weakness     Continue to actively participate in therapy         Other sequelae of other nontraumatic intracranial hemorrhage - Primary     Requires rehabilitation    Baclofen - muscle spacticity   Left hemiparesis          Medications, treatments, and labs  reviewed  Continue medications and treatments as listed in EMR      Scribe Attestation  I, Destin Hylton   attest that this documentation has been prepared under the direction and in the presence of Maggie Whipple MD    Provider Attestation - Scribe documentation  All medical record entries made by the Scribe were at my direction and personally dictated by me. I have reviewed the chart and agree that the record accurately reflects my personal performance of the history, physical exam, discussion and plan.   Maggie Whipple MD        Electronically Signed By: Maggie Whipple MD   8/12/24  2:29 PM

## 2024-08-12 NOTE — PROGRESS NOTES
PROGRESS NOTE    Subjective   Chief complaint: Jordon Waddell is a 73 y.o. male who is an acute skilled patient being seen and evaluated for weakness    HPI:  HPI  Patient is currently working in therapy due to generalized weakness.  Therapy is working with patient on ADLs.  Patient is performing upper body dressing with SBA.  Patient requiring set up for oral hygiene.  Therapy is also working patient on transfers, transferring from bed to wheelchair with mod assist x 2 and verbal cues.  Patient was seen and examined at bedside, appears to be in no acute distress.  Nursing staff voicing no new concerns.    Objective   Vital signs: 128/63, 98.2, 84, 18, 94%, blood sugar 218    Physical Exam  Constitutional:       General: He is not in acute distress.  Eyes:      Extraocular Movements: Extraocular movements intact.   Cardiovascular:      Rate and Rhythm: Normal rate and regular rhythm.   Pulmonary:      Effort: Pulmonary effort is normal.      Breath sounds: Normal breath sounds.   Abdominal:      General: Bowel sounds are normal.      Palpations: Abdomen is soft.   Musculoskeletal:      Cervical back: Neck supple.      Right lower leg: No edema.      Left lower leg: No edema.   Neurological:      Mental Status: He is alert.   Psychiatric:         Mood and Affect: Mood normal.         Behavior: Behavior is cooperative.         Assessment/Plan   Problem List Items Addressed This Visit       Diabetes mellitus with kidney disease (Multi)     Insulin lispro - sliding scale   Glucoscans  Monitor labs  Avoid nephrotoxins         Hypertension     Amlodipine   Monitor BP    Monitor labs            Weakness     Continue to actively participate in therapy         Other sequelae of other nontraumatic intracranial hemorrhage - Primary     Requires rehabilitation    Baclofen - muscle spacticity   Left hemiparesis          Medications, treatments, and labs reviewed  Continue medications and treatments as listed in EMR      Scribe  Attestation  I, Destin Hylton   attest that this documentation has been prepared under the direction and in the presence of Maggie Whipple MD    Provider Attestation - Scribe documentation  All medical record entries made by the Scribe were at my direction and personally dictated by me. I have reviewed the chart and agree that the record accurately reflects my personal performance of the history, physical exam, discussion and plan.   Maggie Whipple MD

## 2024-08-13 ENCOUNTER — NURSING HOME VISIT (OUTPATIENT)
Dept: POST ACUTE CARE | Facility: EXTERNAL LOCATION | Age: 74
End: 2024-08-13
Payer: MEDICARE

## 2024-08-13 DIAGNOSIS — R53.1 WEAKNESS: Primary | ICD-10-CM

## 2024-08-13 DIAGNOSIS — F32.9 MAJOR DEPRESSIVE DISORDER, REMISSION STATUS UNSPECIFIED, UNSPECIFIED WHETHER RECURRENT: ICD-10-CM

## 2024-08-13 DIAGNOSIS — N18.31 STAGE 3A CHRONIC KIDNEY DISEASE (MULTI): ICD-10-CM

## 2024-08-13 DIAGNOSIS — E78.49 OTHER HYPERLIPIDEMIA: ICD-10-CM

## 2024-08-13 DIAGNOSIS — I15.9 SECONDARY HYPERTENSION: ICD-10-CM

## 2024-08-13 DIAGNOSIS — E11.21 DIABETES MELLITUS WITH KIDNEY DISEASE (MULTI): ICD-10-CM

## 2024-08-13 PROCEDURE — 99309 SBSQ NF CARE MODERATE MDM 30: CPT | Performed by: NURSE PRACTITIONER

## 2024-08-13 NOTE — PROGRESS NOTES
PROGRESS NOTE    Subjective   Chief complaint: Jordon Waddell is a 73 y.o. male who is an acute skilled patient being seen and evaluated for weakness    HPI: He is in bed, napping following therapy session.  He reports - that he overdid it in therapy.   The therapist reports that he has regained some movement in his left hand and fingers and he is walking with assistance.   He denies any chest pain or tightness. Reports that his appetite remains good.   He is encouraged with his ongoing progress.     HPI      Objective   Vital signs: 124/73-67-18-98.4     Physical Exam  Vitals and nursing note reviewed.   Constitutional:       General: He is not in acute distress.     Appearance: He is well-groomed.   Eyes:      Extraocular Movements: Extraocular movements intact.   Cardiovascular:      Rate and Rhythm: Normal rate and regular rhythm.   Pulmonary:      Effort: Pulmonary effort is normal.      Breath sounds: Normal breath sounds.      Comments: Lungs clear   Abdominal:      General: Bowel sounds are normal.      Palpations: Abdomen is soft.   Musculoskeletal:      Cervical back: Neck supple.      Right lower leg: No edema.      Left lower leg: No edema.      Comments: Hemiparesis - left upper arm  Left hand with dependent edema    Wearing sling    Neurological:      Mental Status: He is alert.   Psychiatric:         Mood and Affect: Mood normal.         Behavior: Behavior is cooperative.         Assessment/Plan   Problem List Items Addressed This Visit       Hyperlipemia     Atorvastatin   Monitor labs          Stage 3a chronic kidney disease (Multi)     Check labs avoid nephrotoxic meds           Diabetes mellitus with kidney disease (Multi)     Insulin lispro - sliding scale   Glucoscans  Monitor labs  Avoid nephrotoxins         Hypertension     Amlodipine   Monitor BP    Monitor labs            Weakness - Primary     Continue to work in therapy towards goals  Reports that he is making progress and feeling stronger    Is motivated to regain strength and function   Has appointment this week - with rehabilitation specialist.   Has Goal to return to acute rehab.              Depression     Paxil   Has supportive family unit   Good coping skills           Medications, treatments, and labs reviewed  Continue medications and treatments as listed in EMR    Perla Powell, APRN-CNP

## 2024-08-13 NOTE — LETTER
Patient: Jordon Waddell  : 1950    Encounter Date: 2024    PROGRESS NOTE    Subjective  Chief complaint: Jordon Waddell is a 73 y.o. male who is an acute skilled patient being seen and evaluated for weakness    HPI: He is in bed, napping following therapy session.  He reports - that he overdid it in therapy.   The therapist reports that he has regained some movement in his left hand and fingers and he is walking with assistance.   He denies any chest pain or tightness. Reports that his appetite remains good.   He is encouraged with his ongoing progress.     HPI      Objective  Vital signs: 124/73-67-18-98.4     Physical Exam  Vitals and nursing note reviewed.   Constitutional:       General: He is not in acute distress.     Appearance: He is well-groomed.   Eyes:      Extraocular Movements: Extraocular movements intact.   Cardiovascular:      Rate and Rhythm: Normal rate and regular rhythm.   Pulmonary:      Effort: Pulmonary effort is normal.      Breath sounds: Normal breath sounds.      Comments: Lungs clear   Abdominal:      General: Bowel sounds are normal.      Palpations: Abdomen is soft.   Musculoskeletal:      Cervical back: Neck supple.      Right lower leg: No edema.      Left lower leg: No edema.      Comments: Hemiparesis - left upper arm  Left hand with dependent edema    Wearing sling    Neurological:      Mental Status: He is alert.   Psychiatric:         Mood and Affect: Mood normal.         Behavior: Behavior is cooperative.         Assessment/Plan  Problem List Items Addressed This Visit       Hyperlipemia     Atorvastatin   Monitor labs          Stage 3a chronic kidney disease (Multi)     Check labs avoid nephrotoxic meds           Diabetes mellitus with kidney disease (Multi)     Insulin lispro - sliding scale   Glucoscans  Monitor labs  Avoid nephrotoxins         Hypertension     Amlodipine   Monitor BP    Monitor labs            Weakness - Primary     Continue to work in therapy  towards goals  Reports that he is making progress and feeling stronger   Is motivated to regain strength and function   Has appointment this week - with rehabilitation specialist.   Has Goal to return to acute rehab.              Depression     Paxil   Has supportive family unit   Good coping skills           Medications, treatments, and labs reviewed  Continue medications and treatments as listed in EMR    MABEL Fernandes      Electronically Signed By: MABEL Fernandes   8/17/24 10:33 AM

## 2024-08-15 ENCOUNTER — NURSING HOME VISIT (OUTPATIENT)
Dept: POST ACUTE CARE | Facility: EXTERNAL LOCATION | Age: 74
End: 2024-08-15
Payer: MEDICARE

## 2024-08-15 DIAGNOSIS — R53.1 WEAKNESS: ICD-10-CM

## 2024-08-15 DIAGNOSIS — I15.9 SECONDARY HYPERTENSION: ICD-10-CM

## 2024-08-15 DIAGNOSIS — E11.21 DIABETES MELLITUS WITH KIDNEY DISEASE (MULTI): ICD-10-CM

## 2024-08-15 DIAGNOSIS — I69.298 OTHER SEQUELAE OF OTHER NONTRAUMATIC INTRACRANIAL HEMORRHAGE: Primary | ICD-10-CM

## 2024-08-15 PROCEDURE — 99308 SBSQ NF CARE LOW MDM 20: CPT | Performed by: INTERNAL MEDICINE

## 2024-08-15 NOTE — PROGRESS NOTES
PROGRESS NOTE    Subjective   Chief complaint: Jordon Waddell is a 73 y.o. male who is an acute skilled patient being seen and evaluated for weakness    HPI:  HPI  Patient is currently working in therapy on performing therapeutic exercises to improve strength and endurance for functional mobility.  Patient is well also working on gait training, ambulating up to 15 feet x 3 with CGA.  Patient is actively spitting in skilled interventions.  Patient was seen and examined at the bedside, appears to be in no acute distress.  Patient is denying chest pain, shortness of breath, nausea or vomiting.    Objective   Vital signs: 128/63, 97.3, 84, 18, 94%, blood sugar 144    Physical Exam  Constitutional:       General: He is not in acute distress.  Eyes:      Extraocular Movements: Extraocular movements intact.   Cardiovascular:      Rate and Rhythm: Normal rate and regular rhythm.   Pulmonary:      Effort: Pulmonary effort is normal.      Breath sounds: Normal breath sounds.   Abdominal:      General: Bowel sounds are normal.      Palpations: Abdomen is soft.   Musculoskeletal:      Cervical back: Neck supple.      Right lower leg: No edema.      Left lower leg: No edema.   Neurological:      Mental Status: He is alert.   Psychiatric:         Mood and Affect: Mood normal.         Behavior: Behavior is cooperative.         Assessment/Plan   Problem List Items Addressed This Visit       Diabetes mellitus with kidney disease (Multi)     Insulin lispro - sliding scale   Glucoscans  Monitor labs  Avoid nephrotoxins         Hypertension     Amlodipine   Monitor BP    Monitor labs            Weakness     Continue therapy, working towards established goals and plan of care         Other sequelae of other nontraumatic intracranial hemorrhage - Primary     Requires rehabilitation    Baclofen - muscle spacticity   Left hemiparesis          Medications, treatments, and labs reviewed  Continue medications and treatments as listed in  EMR      Scribe Attestation  I, Destin Hylton   attest that this documentation has been prepared under the direction and in the presence of Maggie Whipple MD    Provider Attestation - Scribe documentation  All medical record entries made by the Scribe were at my direction and personally dictated by me. I have reviewed the chart and agree that the record accurately reflects my personal performance of the history, physical exam, discussion and plan.   Maggie Whipple MD

## 2024-08-15 NOTE — LETTER
Patient: Jordon Waddell  : 1950    Encounter Date: 08/15/2024    PROGRESS NOTE    Subjective  Chief complaint: Jordon Waddell is a 73 y.o. male who is an acute skilled patient being seen and evaluated for weakness    HPI:  HPI  Patient is currently working in therapy on performing therapeutic exercises to improve strength and endurance for functional mobility.  Patient is well also working on gait training, ambulating up to 15 feet x 3 with CGA.  Patient is actively spitting in skilled interventions.  Patient was seen and examined at the bedside, appears to be in no acute distress.  Patient is denying chest pain, shortness of breath, nausea or vomiting.    Objective  Vital signs: 128/63, 97.3, 84, 18, 94%, blood sugar 144    Physical Exam  Constitutional:       General: He is not in acute distress.  Eyes:      Extraocular Movements: Extraocular movements intact.   Cardiovascular:      Rate and Rhythm: Normal rate and regular rhythm.   Pulmonary:      Effort: Pulmonary effort is normal.      Breath sounds: Normal breath sounds.   Abdominal:      General: Bowel sounds are normal.      Palpations: Abdomen is soft.   Musculoskeletal:      Cervical back: Neck supple.      Right lower leg: No edema.      Left lower leg: No edema.   Neurological:      Mental Status: He is alert.   Psychiatric:         Mood and Affect: Mood normal.         Behavior: Behavior is cooperative.         Assessment/Plan  Problem List Items Addressed This Visit       Diabetes mellitus with kidney disease (Multi)     Insulin lispro - sliding scale   Glucoscans  Monitor labs  Avoid nephrotoxins         Hypertension     Amlodipine   Monitor BP    Monitor labs            Weakness     Continue therapy, working towards established goals and plan of care         Other sequelae of other nontraumatic intracranial hemorrhage - Primary     Requires rehabilitation    Baclofen - muscle spacticity   Left hemiparesis          Medications, treatments, and  labs reviewed  Continue medications and treatments as listed in EMR      Scribe Attestation  I, Destin Hylton   attest that this documentation has been prepared under the direction and in the presence of Maggie Whipple MD    Provider Attestation - Scribe documentation  All medical record entries made by the Scribe were at my direction and personally dictated by me. I have reviewed the chart and agree that the record accurately reflects my personal performance of the history, physical exam, discussion and plan.   Maggie Whipple MD        Electronically Signed By: Maggie Whipple MD   8/15/24 12:31 PM

## 2024-08-16 ENCOUNTER — APPOINTMENT (OUTPATIENT)
Dept: PHYSICAL MEDICINE AND REHAB | Facility: CLINIC | Age: 74
End: 2024-08-16
Payer: MEDICARE

## 2024-08-16 ENCOUNTER — NURSING HOME VISIT (OUTPATIENT)
Dept: POST ACUTE CARE | Facility: EXTERNAL LOCATION | Age: 74
End: 2024-08-16

## 2024-08-16 DIAGNOSIS — R53.1 WEAKNESS: Primary | ICD-10-CM

## 2024-08-16 DIAGNOSIS — F32.9 MAJOR DEPRESSIVE DISORDER, REMISSION STATUS UNSPECIFIED, UNSPECIFIED WHETHER RECURRENT: ICD-10-CM

## 2024-08-16 DIAGNOSIS — I69.298 OTHER SEQUELAE OF OTHER NONTRAUMATIC INTRACRANIAL HEMORRHAGE: ICD-10-CM

## 2024-08-16 DIAGNOSIS — R53.1 WEAKNESS: ICD-10-CM

## 2024-08-16 DIAGNOSIS — I69.298 OTHER SEQUELAE OF OTHER NONTRAUMATIC INTRACRANIAL HEMORRHAGE: Primary | ICD-10-CM

## 2024-08-16 PROCEDURE — 99309 SBSQ NF CARE MODERATE MDM 30: CPT | Performed by: NURSE PRACTITIONER

## 2024-08-16 NOTE — ASSESSMENT & PLAN NOTE
Continue to work in therapy towards goals  Reports that he is making progress and feeling stronger   Is motivated to regain strength and function   Has appointment this ,morning with rehabilitation specialist. Goal to return to acute rehab.

## 2024-08-16 NOTE — LETTER
Patient: Jordon Waddell  : 1950    Encounter Date: 2024    PROGRESS NOTE    Subjective  Chief complaint: Jordon Waddell is a 73 y.o. male who is an acute skilled patient being seen and evaluated for weakness    HPI: He reports that he is doing well and feeling stronger. He is in wheelchair. Wearing sling on his left arm. Is scheduled for evaluation by the  rehabilitation specialist to determine if he qualifies for transfer into acute rehab program.   Is in good spirits.   HPI      Objective  Vital signs: 128/76-72-18-98.3     Physical Exam  Vitals and nursing note reviewed.   Constitutional:       General: He is not in acute distress.     Appearance: He is well-groomed.   Eyes:      Extraocular Movements: Extraocular movements intact.   Cardiovascular:      Rate and Rhythm: Normal rate and regular rhythm.   Pulmonary:      Effort: Pulmonary effort is normal.      Breath sounds: Normal breath sounds.      Comments: Lungs clear   Abdominal:      General: Bowel sounds are normal.      Palpations: Abdomen is soft.   Musculoskeletal:      Cervical back: Neck supple.      Right lower leg: No edema.      Left lower leg: No edema.      Comments: Hemiparesis - left upper arm  Left hand with dependent edema    Wearing sling    Neurological:      Mental Status: He is alert.   Psychiatric:         Mood and Affect: Mood normal.         Behavior: Behavior is cooperative.         Assessment/Plan  Problem List Items Addressed This Visit       Weakness - Primary     Continue to work in therapy towards goals  Reports that he is making progress and feeling stronger   Is motivated to regain strength and function   Has appointment this ,morning with rehabilitation specialist. Goal to return to acute rehab.              Other sequelae of other nontraumatic intracranial hemorrhage     Requires rehabilitation    Baclofen - muscle spacticity   Left hemiparesis, wearing sling left arm   Was started on fluoxetine per  rehabilitation physician - post stroke motor recovery             Depression     Paxil   Has supportive family unit   Good coping skills           Medications, treatments, and labs reviewed  Continue medications and treatments as listed in EMR    MABEL Fernandes      Electronically Signed By: MABEL Fernandes   8/17/24  3:20 PM

## 2024-08-16 NOTE — PROGRESS NOTES
Chief complaint:   I had the pleasure of seeing Jordon Waddell, in clinic today. He is a pleasant 73 y.o. right handed male with a past medical history of hypertension, hyperlipidemia, diabetes who presented to the emergency department on 6/26 with sudden onset left-sided weakness, facial droop dysarthria, and mild left-sided numbness.  He was found to have a right thalamic intracranial hemorrhage hypertensive in etiology.  Hospital course was complicated by development of left soleal vein DVT.  Patient was discharged to acute rehab ([facility]) for approximately 16 days and was then admitted to [facility] skilled [facility] for further rehabilitation.    Since being at [facility], he has regained significant strength in his left leg and to a lesser extent left arm. He is walking some steps in [place] by himself apparently at CGA/Min assist. He is currently taking baclofen 5mg with good effect, has previously tried higher doses but was unable to tolerate. He is tolerating a regular diet well. He is sleeping well. He denies any pain. He is having bowel movements daily and urinating without issues; he is voiding in bed with bedside urinal and depends.     PT: We do not have access to formal physical therapy notes, however based on questioning appears that he is ambulating at the level of contact-guard assist    OT: Again, we do not have access to formal occupational therapy notes, however based on questioning appears that he has min/mod assist with upper and lower extremity dressing.    Social history: Currently lives with his wife and daughter and one-story home with no steps to enter.  Has a tub shower combo with slide board.  Home is apparently wheelchair accessible, have not measured door frames but previous owner was wheelchair-bound and was able to ambulate without issue.  Has an [employer] for transportation.  Retired but was previously working at [employer]; his goal is to return to employment.      IMAGING:    ===  12/29/21 ===    - Impression -  1. No evidence for quadriceps injury.  2. Advanced left hip joint osteoarthrosis.  3. Small bilateral hydroceles with suggestion of left-sided  varicocele.  4. Please refer to separately dictated MRI of the left knee for  findings related to the left knee.    I personally reviewed the images/study and I agree with the findings  as stated. This study was interpreted at [place], [place].    === 06/26/24 ===    CT HEAD WO IV CONTRAST    - Impression -  There has been no significant interval change when compared with the  prior study dated 06/26/2024 time 12:35 p.m. as described above.    MACRO:  None.    Signed by: Aaron Soto 6/27/2024 6:56 AM  Dictation workstation:   [id number]        === 06/26/24 ===    XR ANKLE 3+ VIEWS LEFT    - Impression -  1. Mild midfoot degenerative changes without acute fracture or  malalignment.    MACRO:  None.    Signed by: Milagros Perez 6/30/2024 11:06 AM  Dictation workstation:   [id number]    Lab Results   Component Value Date    HGBA1C 6.2 (H) 06/26/2024     __________________________________    ROS: The patient denies any bowel or bladder incontinence/accidents, night sweats, fevers, chills, recent significant weight loss. A 14 point review of systems was reviewed with the patient and is as above and otherwise negative.      Physical Exam     PHYSICAL EXAM    GEN - Alert, well-developed, well-nourished, no acute distress  PSYCH - Cooperative, appropriate mood and affect  HEENT - NC/AT  RESP - Non-labored respirations, equal expansion  CV - warm and well-perfused, no cyanosis, some disuse edema noted in left upper extremity.  ABD- soft, ND  SKIN - No rash.  MSK  - as below. Some pain with LUE.    NEURO: Trace amount of left-sided facial droop.  Fluent and intelligible speech. Non-dysarthric. Alert and oriented x 4 as evidence by appropriate conversation.  - Upper extremity strength - 2/5 shoulder  internal rotation, trace movement with elbow extension, flexion, fifth digit extension.  Otherwise no appreciable movement.  Modified Spring 1/4 elbow extension and flexion.  - Lower extremity strength - 4/5 hip flexion, knee extension, knee flexion, 5/5 ankle plantar and dorsiflexion. No spasticity noted.   Sensation -sensation fully intact in the bilateral upper and lower extremities.  Reflexes -2+ biceps, brachioradialis, patella, Achilles right.  3+ biceps, brachialis, patella, Achilles left.  Kiley sign positive left side.  GAIT -currently in wheelchair, unable to ambulate due to safety reasons.    IMPRESSION:    This is a very pleasant 73-year-old male with a past medical history of hypertension and diabetes who presents for evaluation after developing sudden onset left-sided weakness later found to have right thalamic intracranial hemorrhage felt to be hypertensive in etiology.  Patient has been residing in skilled nursing facility following stay at [facility] [facility] acute re[facility] with plans to possibly return to [facility] re[facility] pending further functional improvement.    Overall he is making excellent progress and [place] skilled nursing facility and I do believe he would be appropriate candidate to return to [facility] acute rehab likely in the next 2-4 weeks.  Clinical liaison at [facility] contact today regarding this.    Plan:   -We will request formal PT/OT notes to verify his functional progress and determine readiness for acute rehab. Email sent to clinical liaison at [facility] today; we will send today's note to begin process [facility] with plans to return home afterwards  -Home set up is very accommodating and he has excellent family support at home (wife and daughter  -Based on the type of stroke, timeline, current functional status/improvement, and motivation, I do think he would be ready to return to [facility] [place] rehab in approximately 2-3 weeks if not sooner.   -L  soleal DVT: resolved   -Spasticity: mild amount of spasticity in the left upper extremity that does not appear to be limiting his functional progress.  Continue with baclofen 5 mg nightly  -Bowels/bladder: currently voiding volitionally, limited use of commode secondary to ambulation limitations  -Pain: denying any pain today from stroke; does endorse LUE pain with passive ROM from shoulder replacement surgery, continue with sling for comfort.     The patient expressed understanding and agreement with the assessment and plan. Patient encouraged to contact us should they have any questions, concerns, or any changes in symptoms.       Patient seen and discussed with attending. Note is not finalized until signed by attending physician.     Bret García, DO PGY3  Physical Medicine & Rehabilitation       ** Dictated with voice recognition software, please forgive any errors in grammar and/or spelling **

## 2024-08-16 NOTE — PROGRESS NOTES
PROGRESS NOTE    Subjective   Chief complaint: Jordon Waddell is a 73 y.o. male who is an acute skilled patient being seen and evaluated for weakness    HPI: He reports that he is doing well and feeling stronger. He is in wheelchair. Wearing sling on his left arm. Is scheduled for evaluation by the  rehabilitation specialist to determine if he qualifies for transfer into acute rehab program.   Is in good spirits.   HPI      Objective   Vital signs: 128/76-72-18-98.3     Physical Exam  Vitals and nursing note reviewed.   Constitutional:       General: He is not in acute distress.     Appearance: He is well-groomed.   Eyes:      Extraocular Movements: Extraocular movements intact.   Cardiovascular:      Rate and Rhythm: Normal rate and regular rhythm.   Pulmonary:      Effort: Pulmonary effort is normal.      Breath sounds: Normal breath sounds.      Comments: Lungs clear   Abdominal:      General: Bowel sounds are normal.      Palpations: Abdomen is soft.   Musculoskeletal:      Cervical back: Neck supple.      Right lower leg: No edema.      Left lower leg: No edema.      Comments: Hemiparesis - left upper arm  Left hand with dependent edema    Wearing sling    Neurological:      Mental Status: He is alert.   Psychiatric:         Mood and Affect: Mood normal.         Behavior: Behavior is cooperative.         Assessment/Plan   Problem List Items Addressed This Visit       Weakness - Primary     Continue to work in therapy towards goals  Reports that he is making progress and feeling stronger   Is motivated to regain strength and function   Has appointment this ,morning with rehabilitation specialist. Goal to return to acute rehab.              Other sequelae of other nontraumatic intracranial hemorrhage     Requires rehabilitation    Baclofen - muscle spacticity   Left hemiparesis, wearing sling left arm   Was started on fluoxetine per rehabilitation physician - post stroke motor recovery             Depression      Paxil   Has supportive family unit   Good coping skills           Medications, treatments, and labs reviewed  Continue medications and treatments as listed in EMR    Perla Powell, APRN-CNP

## 2024-08-17 PROBLEM — F32.A DEPRESSION: Status: ACTIVE | Noted: 2024-08-17

## 2024-08-17 NOTE — ASSESSMENT & PLAN NOTE
Continue to work in therapy towards goals  Reports that he is making progress and feeling stronger   Is motivated to regain strength and function   Has appointment this week - with rehabilitation specialist.   Has Goal to return to acute rehab.

## 2024-08-19 ENCOUNTER — NURSING HOME VISIT (OUTPATIENT)
Dept: POST ACUTE CARE | Facility: EXTERNAL LOCATION | Age: 74
End: 2024-08-19
Payer: MEDICARE

## 2024-08-19 DIAGNOSIS — R53.1 WEAKNESS: ICD-10-CM

## 2024-08-19 DIAGNOSIS — E11.21 DIABETES MELLITUS WITH KIDNEY DISEASE (MULTI): Primary | ICD-10-CM

## 2024-08-19 DIAGNOSIS — I15.9 SECONDARY HYPERTENSION: ICD-10-CM

## 2024-08-19 DIAGNOSIS — E78.49 OTHER HYPERLIPIDEMIA: ICD-10-CM

## 2024-08-19 PROCEDURE — 99308 SBSQ NF CARE LOW MDM 20: CPT | Performed by: INTERNAL MEDICINE

## 2024-08-19 NOTE — PROGRESS NOTES
PROGRESS NOTE    Subjective   Chief complaint: Jordon Waddell is a 73 y.o. male who is an acute skilled patient being seen and evaluated for weakness    HPI:  HPI  Patient is currently working in therapy, working on strengthening exercises and activities to improve strength, range of motion for overall improved mobility.  Patient is also working on balance activities to improve functional abilities.  Patient does require transfer assist x 2 from various surfaces.  Patient was seen and examined at the bedside, appears to be in no acute distress.  Nursing reporting no new concerns.  Patient denies chest pain, shortness of breath, nausea, vomiting.    Objective   Vital signs: 128/63, 97.3, 84, 18, 96%, blood sugar 167    Physical Exam  Constitutional:       General: He is not in acute distress.  Eyes:      Extraocular Movements: Extraocular movements intact.   Cardiovascular:      Rate and Rhythm: Normal rate and regular rhythm.   Pulmonary:      Effort: Pulmonary effort is normal.      Breath sounds: Normal breath sounds.   Abdominal:      General: Bowel sounds are normal.      Palpations: Abdomen is soft.   Musculoskeletal:      Cervical back: Neck supple.      Right lower leg: No edema.      Left lower leg: No edema.   Neurological:      Mental Status: He is alert.      Motor: Weakness present.   Psychiatric:         Mood and Affect: Mood normal.         Behavior: Behavior is cooperative.         Assessment/Plan   Problem List Items Addressed This Visit       Hyperlipemia     Atorvastatin   Monitor lipids and LFTs         Diabetes mellitus with kidney disease (Multi) - Primary     Insulin lispro - sliding scale   Glucoscans  Monitor labs  Avoid nephrotoxins         Hypertension     Amlodipine   Monitor BP    Monitor labs            Weakness     Continue progressing towards established goals and therapy          Medications, treatments, and labs reviewed  Continue medications and treatments as listed in  EMR      Scribe Attestation  I, Destin Hylton   attest that this documentation has been prepared under the direction and in the presence of Maggie Whipple MD    Provider Attestation - Scribe documentation  All medical record entries made by the Scribe were at my direction and personally dictated by me. I have reviewed the chart and agree that the record accurately reflects my personal performance of the history, physical exam, discussion and plan.   Maggie Whipple MD

## 2024-08-19 NOTE — LETTER
Patient: Jordon Waddell  : 1950    Encounter Date: 2024    PROGRESS NOTE    Subjective  Chief complaint: Jordon Waddell is a 73 y.o. male who is an acute skilled patient being seen and evaluated for weakness    HPI:  HPI  Patient is currently working in therapy, working on strengthening exercises and activities to improve strength, range of motion for overall improved mobility.  Patient is also working on balance activities to improve functional abilities.  Patient does require transfer assist x 2 from various surfaces.  Patient was seen and examined at the bedside, appears to be in no acute distress.  Nursing reporting no new concerns.  Patient denies chest pain, shortness of breath, nausea, vomiting.    Objective  Vital signs: 128/63, 97.3, 84, 18, 96%, blood sugar 167    Physical Exam  Constitutional:       General: He is not in acute distress.  Eyes:      Extraocular Movements: Extraocular movements intact.   Cardiovascular:      Rate and Rhythm: Normal rate and regular rhythm.   Pulmonary:      Effort: Pulmonary effort is normal.      Breath sounds: Normal breath sounds.   Abdominal:      General: Bowel sounds are normal.      Palpations: Abdomen is soft.   Musculoskeletal:      Cervical back: Neck supple.      Right lower leg: No edema.      Left lower leg: No edema.   Neurological:      Mental Status: He is alert.      Motor: Weakness present.   Psychiatric:         Mood and Affect: Mood normal.         Behavior: Behavior is cooperative.         Assessment/Plan  Problem List Items Addressed This Visit       Hyperlipemia     Atorvastatin   Monitor lipids and LFTs         Diabetes mellitus with kidney disease (Multi) - Primary     Insulin lispro - sliding scale   Glucoscans  Monitor labs  Avoid nephrotoxins         Hypertension     Amlodipine   Monitor BP    Monitor labs            Weakness     Continue progressing towards established goals and therapy          Medications, treatments, and labs  reviewed  Continue medications and treatments as listed in EMR      Scribe Attestation  I, Destin Hylton   attest that this documentation has been prepared under the direction and in the presence of Maggie Whipple MD    Provider Attestation - Scribe documentation  All medical record entries made by the Scribe were at my direction and personally dictated by me. I have reviewed the chart and agree that the record accurately reflects my personal performance of the history, physical exam, discussion and plan.   Maggie Whipple MD        Electronically Signed By: Maggie Whipple MD   8/19/24  3:10 PM

## 2024-08-22 ENCOUNTER — NURSING HOME VISIT (OUTPATIENT)
Dept: POST ACUTE CARE | Facility: EXTERNAL LOCATION | Age: 74
End: 2024-08-22
Payer: MEDICARE

## 2024-08-22 DIAGNOSIS — R53.1 WEAKNESS: ICD-10-CM

## 2024-08-22 DIAGNOSIS — R19.7 DIARRHEA, UNSPECIFIED TYPE: ICD-10-CM

## 2024-08-22 DIAGNOSIS — E11.21 DIABETES MELLITUS WITH KIDNEY DISEASE (MULTI): ICD-10-CM

## 2024-08-22 DIAGNOSIS — I15.9 SECONDARY HYPERTENSION: ICD-10-CM

## 2024-08-22 DIAGNOSIS — I69.298 OTHER SEQUELAE OF OTHER NONTRAUMATIC INTRACRANIAL HEMORRHAGE: Primary | ICD-10-CM

## 2024-08-22 PROCEDURE — 99308 SBSQ NF CARE LOW MDM 20: CPT | Performed by: INTERNAL MEDICINE

## 2024-08-22 NOTE — PROGRESS NOTES
PROGRESS NOTE    Subjective   Chief complaint: Jordon Waddell is a 73 y.o. male who is an acute skilled patient being seen and evaluated for weakness    HPI:  HPI  Patient is currently working in therapy, working on strengthening, balance activities to improve range of motion for increasing functional abilities.  Patient was seen and examined at the bedside.  Patient had complaints of diarrhea and reviewing patient's medications to check for side effects.  Patient denies nausea or vomiting.  Denies fever or chills.    Objective   Vital signs: 128/63, 97.4, 84, 18, 98% blood sugar 241    Physical Exam  Constitutional:       General: He is not in acute distress.  Eyes:      Extraocular Movements: Extraocular movements intact.   Cardiovascular:      Rate and Rhythm: Normal rate and regular rhythm.   Pulmonary:      Effort: Pulmonary effort is normal.      Breath sounds: Normal breath sounds.   Abdominal:      General: Bowel sounds are normal.      Palpations: Abdomen is soft.   Musculoskeletal:      Cervical back: Neck supple.      Right lower leg: No edema.      Left lower leg: No edema.   Neurological:      Mental Status: He is alert.      Motor: Weakness present.   Psychiatric:         Mood and Affect: Mood normal.         Behavior: Behavior is cooperative.         Assessment/Plan   Problem List Items Addressed This Visit       Diabetes mellitus with kidney disease (Multi)     Insulin lispro - sliding scale   Glucoscans  Monitor labs  Avoid nephrotoxins         Hypertension     Amlodipine   Monitor BP    Monitor labs            Weakness     Continue therapy, work towards goals         Other sequelae of other nontraumatic intracranial hemorrhage - Primary     Requires rehabilitation    Baclofen - muscle spacticity   Left hemiparesis         Diarrhea     Medication review for potential side effects          Medications, treatments, and labs reviewed  Continue medications and treatments as listed in EMR      Scribe  Attestation  I, Destin Hylton   attest that this documentation has been prepared under the direction and in the presence of Maggie Whipple MD    Provider Attestation - Scribe documentation  All medical record entries made by the Scribe were at my direction and personally dictated by me. I have reviewed the chart and agree that the record accurately reflects my personal performance of the history, physical exam, discussion and plan.   Maggie Whipple MD

## 2024-08-22 NOTE — LETTER
Patient: Jordon Waddell  : 1950    Encounter Date: 2024    PROGRESS NOTE    Subjective  Chief complaint: Jordon Waddell is a 73 y.o. male who is an acute skilled patient being seen and evaluated for weakness    HPI:  HPI  Patient is currently working in therapy, working on strengthening, balance activities to improve range of motion for increasing functional abilities.  Patient was seen and examined at the bedside.  Patient had complaints of diarrhea and reviewing patient's medications to check for side effects.  Patient denies nausea or vomiting.  Denies fever or chills.    Objective  Vital signs: 128/63, 97.4, 84, 18, 98% blood sugar 241    Physical Exam  Constitutional:       General: He is not in acute distress.  Eyes:      Extraocular Movements: Extraocular movements intact.   Cardiovascular:      Rate and Rhythm: Normal rate and regular rhythm.   Pulmonary:      Effort: Pulmonary effort is normal.      Breath sounds: Normal breath sounds.   Abdominal:      General: Bowel sounds are normal.      Palpations: Abdomen is soft.   Musculoskeletal:      Cervical back: Neck supple.      Right lower leg: No edema.      Left lower leg: No edema.   Neurological:      Mental Status: He is alert.      Motor: Weakness present.   Psychiatric:         Mood and Affect: Mood normal.         Behavior: Behavior is cooperative.         Assessment/Plan  Problem List Items Addressed This Visit       Diabetes mellitus with kidney disease (Multi)     Insulin lispro - sliding scale   Glucoscans  Monitor labs  Avoid nephrotoxins         Hypertension     Amlodipine   Monitor BP    Monitor labs            Weakness     Continue therapy, work towards goals         Other sequelae of other nontraumatic intracranial hemorrhage - Primary     Requires rehabilitation    Baclofen - muscle spacticity   Left hemiparesis         Diarrhea     Medication review for potential side effects          Medications, treatments, and labs  reviewed  Continue medications and treatments as listed in EMR      Scribe Attestation  I, Destin Hylton   attest that this documentation has been prepared under the direction and in the presence of Maggie Whipple MD    Provider Attestation - Scribe documentation  All medical record entries made by the Scribe were at my direction and personally dictated by me. I have reviewed the chart and agree that the record accurately reflects my personal performance of the history, physical exam, discussion and plan.   Maggie Whipple MD        Electronically Signed By: Maggie Whipple MD   8/22/24  2:41 PM

## 2024-08-26 ENCOUNTER — NURSING HOME VISIT (OUTPATIENT)
Dept: POST ACUTE CARE | Facility: EXTERNAL LOCATION | Age: 74
End: 2024-08-26
Payer: MEDICARE

## 2024-08-26 DIAGNOSIS — I69.298 OTHER SEQUELAE OF OTHER NONTRAUMATIC INTRACRANIAL HEMORRHAGE: ICD-10-CM

## 2024-08-26 DIAGNOSIS — R53.1 WEAKNESS: ICD-10-CM

## 2024-08-26 DIAGNOSIS — E11.21 DIABETES MELLITUS WITH KIDNEY DISEASE (MULTI): Primary | ICD-10-CM

## 2024-08-26 DIAGNOSIS — R19.7 DIARRHEA, UNSPECIFIED TYPE: ICD-10-CM

## 2024-08-26 DIAGNOSIS — I15.9 SECONDARY HYPERTENSION: ICD-10-CM

## 2024-08-26 PROCEDURE — 99308 SBSQ NF CARE LOW MDM 20: CPT | Performed by: INTERNAL MEDICINE

## 2024-08-26 NOTE — LETTER
Patient: Jordon Waddell  : 1950    Encounter Date: 2024    PROGRESS NOTE    Subjective  Chief complaint: Jordon Waddell is a 73 y.o. male who is an acute skilled patient being seen and evaluated for weakness    HPI:  HPI  Patient is currently working with therapy working on strengthening and balance activities as well as range of motion to improve functional abilities.  Patient also had reports of diarrhea and stool for C. difficile is pending at this time.  Patient denies fever or chills.  Nursing reported no other new concerns at this time.    Objective  Vital signs: 128/63, 97.1, 84, 18, 98%     Physical Exam  Constitutional:       General: He is not in acute distress.  Eyes:      Extraocular Movements: Extraocular movements intact.   Cardiovascular:      Rate and Rhythm: Normal rate and regular rhythm.   Pulmonary:      Effort: Pulmonary effort is normal.      Breath sounds: Normal breath sounds.   Abdominal:      General: Bowel sounds are normal.      Palpations: Abdomen is soft.   Musculoskeletal:      Cervical back: Neck supple.      Right lower leg: No edema.      Left lower leg: No edema.   Neurological:      Mental Status: He is alert.      Motor: Weakness present.   Psychiatric:         Mood and Affect: Mood normal.         Behavior: Behavior is cooperative.         Assessment/Plan  Problem List Items Addressed This Visit       Diabetes mellitus with kidney disease (Multi) - Primary     Insulin lispro - sliding scale   Glucoscans  Monitor labs  Avoid nephrotoxins         Hypertension     Amlodipine   Monitor BP    Monitor labs            Weakness     Continue to work towards established goals in therapy         Other sequelae of other nontraumatic intracranial hemorrhage     Requires rehabilitation    Baclofen - muscle spacticity   Left hemiparesis         Diarrhea     Stool for C. difficile is pending          Medications, treatments, and labs reviewed  Continue medications and  treatments as listed in EMR      Scribe Attestation  I, Destin Hylton   attest that this documentation has been prepared under the direction and in the presence of Maggie Whipple MD    Provider Attestation - Scribe documentation  All medical record entries made by the Scribe were at my direction and personally dictated by me. I have reviewed the chart and agree that the record accurately reflects my personal performance of the history, physical exam, discussion and plan.   Maggie Whipple MD        Electronically Signed By: Maggie Whipple MD   8/26/24  5:14 PM

## 2024-08-26 NOTE — PROGRESS NOTES
PROGRESS NOTE    Subjective   Chief complaint: Jordon Waddell is a 73 y.o. male who is an acute skilled patient being seen and evaluated for weakness    HPI:  HPI  Patient is currently working with therapy working on strengthening and balance activities as well as range of motion to improve functional abilities.  Patient also had reports of diarrhea and stool for C. difficile is pending at this time.  Patient denies fever or chills.  Nursing reported no other new concerns at this time.    Objective   Vital signs: 128/63, 97.1, 84, 18, 98%     Physical Exam  Constitutional:       General: He is not in acute distress.  Eyes:      Extraocular Movements: Extraocular movements intact.   Cardiovascular:      Rate and Rhythm: Normal rate and regular rhythm.   Pulmonary:      Effort: Pulmonary effort is normal.      Breath sounds: Normal breath sounds.   Abdominal:      General: Bowel sounds are normal.      Palpations: Abdomen is soft.   Musculoskeletal:      Cervical back: Neck supple.      Right lower leg: No edema.      Left lower leg: No edema.   Neurological:      Mental Status: He is alert.      Motor: Weakness present.   Psychiatric:         Mood and Affect: Mood normal.         Behavior: Behavior is cooperative.         Assessment/Plan   Problem List Items Addressed This Visit       Diabetes mellitus with kidney disease (Multi) - Primary     Insulin lispro - sliding scale   Glucoscans  Monitor labs  Avoid nephrotoxins         Hypertension     Amlodipine   Monitor BP    Monitor labs            Weakness     Continue to work towards established goals in therapy         Other sequelae of other nontraumatic intracranial hemorrhage     Requires rehabilitation    Baclofen - muscle spacticity   Left hemiparesis         Diarrhea     Stool for C. difficile is pending          Medications, treatments, and labs reviewed  Continue medications and treatments as listed in EMR      Scribe Attestation  I, Mindy Faith,  Scribe   attest that this documentation has been prepared under the direction and in the presence of Maggie Whipple MD    Provider Attestation - Scribe documentation  All medical record entries made by the Scribe were at my direction and personally dictated by me. I have reviewed the chart and agree that the record accurately reflects my personal performance of the history, physical exam, discussion and plan.   Maggie Whipple MD

## 2024-08-27 ENCOUNTER — NURSING HOME VISIT (OUTPATIENT)
Dept: POST ACUTE CARE | Facility: EXTERNAL LOCATION | Age: 74
End: 2024-08-27
Payer: MEDICARE

## 2024-08-27 DIAGNOSIS — R19.7 DIARRHEA, UNSPECIFIED TYPE: ICD-10-CM

## 2024-08-27 DIAGNOSIS — F32.9 MAJOR DEPRESSIVE DISORDER, REMISSION STATUS UNSPECIFIED, UNSPECIFIED WHETHER RECURRENT: ICD-10-CM

## 2024-08-27 DIAGNOSIS — R53.1 WEAKNESS: Primary | ICD-10-CM

## 2024-08-27 DIAGNOSIS — E11.21 DIABETES MELLITUS WITH KIDNEY DISEASE (MULTI): ICD-10-CM

## 2024-08-27 DIAGNOSIS — I15.9 SECONDARY HYPERTENSION: ICD-10-CM

## 2024-08-27 PROCEDURE — 99309 SBSQ NF CARE MODERATE MDM 30: CPT | Performed by: NURSE PRACTITIONER

## 2024-08-27 NOTE — LETTER
Patient: Jordon Waddell  : 1950    Encounter Date: 2024    PROGRESS NOTE    Subjective  Chief complaint: Jordon Waddell is a 73 y.o. male who is an acute skilled patient being seen and evaluated for weakness    HPI: Is visiting with his wife. She reports that he is making very good progress in therapy. They both reported that they do not want him to transfer to acute rehabilitation because of his ongoing progress in therapy in this facility.   Remains motivated to return home. Denies any concerns or discomfort.    He reports that he continues with episodes of diarrhea. Will add metamucil to bulk up his stool.   Stool  sample sent for c diff - pending. Denies any gastric distress.   HPI      Objective  Vital signs: 127/65-72-19-98.4     Physical Exam  Vitals and nursing note reviewed.   Constitutional:       General: He is not in acute distress.     Appearance: He is well-groomed.   Eyes:      Extraocular Movements: Extraocular movements intact.   Cardiovascular:      Rate and Rhythm: Normal rate and regular rhythm.   Pulmonary:      Effort: Pulmonary effort is normal.      Breath sounds: Normal breath sounds.      Comments: Lungs clear   Abdominal:      General: Bowel sounds are normal.      Palpations: Abdomen is soft.   Musculoskeletal:      Cervical back: Neck supple.      Right lower leg: No edema.      Left lower leg: No edema.      Comments: Hemiparesis - left upper arm  Left hand with slight dependent edema    Wearing sling    Neurological:      Mental Status: He is alert.   Psychiatric:         Mood and Affect: Mood normal.         Behavior: Behavior is cooperative.         Assessment/Plan  Problem List Items Addressed This Visit       Diabetes mellitus with kidney disease (Multi)     Insulin lispro - sliding scale   Glucoscans  Monitor labs  Avoid nephrotoxins         Hypertension     Amlodipine   Monitor BP    Monitor labs            Weakness - Primary     Continue to work in therapy towards  goals  Reports that he is making progress and feeling stronger   Is motivated to regain strength and function   Is motivated to return home.   Is not intrested in returning to acute rehab - feels he is making good progress wit his current therapy           Depression     Paxil - discontinue per his request  he reported gastric distress   Has supportive family unit   Good coping skills           Diarrhea     Stool for C. difficile is pending  - negative   Added metamucil to bulk up stool            Medications, treatments, and labs reviewed  Continue medications and treatments as listed in EMR    MABEL Fernandes        Electronically Signed By: MABEL Fernandes   8/30/24  9:01 PM

## 2024-08-28 NOTE — PROGRESS NOTES
PROGRESS NOTE    Subjective   Chief complaint: Jordon Waddell is a 73 y.o. male who is an acute skilled patient being seen and evaluated for weakness    HPI: Is visiting with his wife. She reports that he is making very good progress in therapy. They both reported that they do not want him to transfer to acute rehabilitation because of his ongoing progress in therapy in this facility.   Remains motivated to return home. Denies any concerns or discomfort.    He reports that he continues with episodes of diarrhea. Will add metamucil to bulk up his stool.   Stool  sample sent for c diff - pending. Denies any gastric distress.   HPI      Objective   Vital signs: 127/65-72-19-98.4     Physical Exam  Vitals and nursing note reviewed.   Constitutional:       General: He is not in acute distress.     Appearance: He is well-groomed.   Eyes:      Extraocular Movements: Extraocular movements intact.   Cardiovascular:      Rate and Rhythm: Normal rate and regular rhythm.   Pulmonary:      Effort: Pulmonary effort is normal.      Breath sounds: Normal breath sounds.      Comments: Lungs clear   Abdominal:      General: Bowel sounds are normal.      Palpations: Abdomen is soft.   Musculoskeletal:      Cervical back: Neck supple.      Right lower leg: No edema.      Left lower leg: No edema.      Comments: Hemiparesis - left upper arm  Left hand with slight dependent edema    Wearing sling    Neurological:      Mental Status: He is alert.   Psychiatric:         Mood and Affect: Mood normal.         Behavior: Behavior is cooperative.         Assessment/Plan   Problem List Items Addressed This Visit       Diabetes mellitus with kidney disease (Multi)     Insulin lispro - sliding scale   Glucoscans  Monitor labs  Avoid nephrotoxins         Hypertension     Amlodipine   Monitor BP    Monitor labs            Weakness - Primary     Continue to work in therapy towards goals  Reports that he is making progress and feeling stronger   Is  motivated to regain strength and function   Is motivated to return home.   Is not intrested in returning to acute rehab - feels he is making good progress wit his current therapy           Depression     Paxil - discontinue per his request  he reported gastric distress   Has supportive family unit   Good coping skills           Diarrhea     Stool for C. difficile is pending  - negative   Added metamucil to bulk up stool            Medications, treatments, and labs reviewed  Continue medications and treatments as listed in EMR    Perla Powell, APRN-CNP

## 2024-08-29 ENCOUNTER — NURSING HOME VISIT (OUTPATIENT)
Dept: POST ACUTE CARE | Facility: EXTERNAL LOCATION | Age: 74
End: 2024-08-29
Payer: MEDICARE

## 2024-08-29 DIAGNOSIS — I69.298 OTHER SEQUELAE OF OTHER NONTRAUMATIC INTRACRANIAL HEMORRHAGE: Primary | ICD-10-CM

## 2024-08-29 DIAGNOSIS — E11.21 DIABETES MELLITUS WITH KIDNEY DISEASE (MULTI): ICD-10-CM

## 2024-08-29 DIAGNOSIS — I15.9 SECONDARY HYPERTENSION: ICD-10-CM

## 2024-08-29 DIAGNOSIS — R53.1 WEAKNESS: ICD-10-CM

## 2024-08-29 PROCEDURE — 99308 SBSQ NF CARE LOW MDM 20: CPT | Performed by: INTERNAL MEDICINE

## 2024-08-29 NOTE — PROGRESS NOTES
PROGRESS NOTE    Subjective   Chief complaint: Jordon Waddell is a 73 y.o. male who is an acute skilled patient being seen and evaluated for weakness    HPI:  HPI  Therapy is currently working patient towards established goals.  Patient is performing skilled interventions focusing on close kinetic chain activity to increase functional skills and transfer training to increase functional task performance.  Patient is working on transfers, simulating car transfers, sit to stand and stand pivot, completed with 1 loss of balance episode and required max assist to recover.  Patient was seen and examined at the bedside appears to be in no acute distress.  Nursing staff voicing no new concerns.  Patient is denying constitutional symptoms at this time.    Objective   Vital signs: 128/63, 97.384, 18, 96% blood sugar 162    Physical Exam  Constitutional:       General: He is not in acute distress.  Eyes:      Extraocular Movements: Extraocular movements intact.   Cardiovascular:      Rate and Rhythm: Normal rate and regular rhythm.   Pulmonary:      Effort: Pulmonary effort is normal.      Breath sounds: Normal breath sounds.   Abdominal:      General: Bowel sounds are normal.      Palpations: Abdomen is soft.   Musculoskeletal:      Cervical back: Neck supple.      Right lower leg: No edema.      Left lower leg: No edema.   Neurological:      Mental Status: He is alert.      Motor: Weakness present.   Psychiatric:         Mood and Affect: Mood normal.         Behavior: Behavior is cooperative.         Assessment/Plan   Problem List Items Addressed This Visit       Diabetes mellitus with kidney disease (Multi)     Insulin lispro - sliding scale   Glucoscans  Monitor labs  Avoid nephrotoxins         Hypertension     Amlodipine   Monitor BP    Monitor labs          Weakness     Actively participating in therapy, continue         Other sequelae of other nontraumatic intracranial hemorrhage - Primary     Requires rehabilitation     Baclofen - muscle spacticity   Left hemiparesis          Medications, treatments, and labs reviewed  Continue medications and treatments as listed in EMR      Scribe Attestation  I, Destin Hylton   attest that this documentation has been prepared under the direction and in the presence of Maggie Whipple MD    Provider Attestation - Scribe documentation  All medical record entries made by the Scribe were at my direction and personally dictated by me. I have reviewed the chart and agree that the record accurately reflects my personal performance of the history, physical exam, discussion and plan.   Maggie Whipple MD

## 2024-08-29 NOTE — LETTER
Patient: Jordon Waddell  : 1950    Encounter Date: 2024    PROGRESS NOTE    Subjective  Chief complaint: Jordon Waddell is a 73 y.o. male who is an acute skilled patient being seen and evaluated for weakness    HPI:  HPI  Therapy is currently working patient towards established goals.  Patient is performing skilled interventions focusing on close kinetic chain activity to increase functional skills and transfer training to increase functional task performance.  Patient is working on transfers, simulating car transfers, sit to stand and stand pivot, completed with 1 loss of balance episode and required max assist to recover.  Patient was seen and examined at the bedside appears to be in no acute distress.  Nursing staff voicing no new concerns.  Patient is denying constitutional symptoms at this time.    Objective  Vital signs: 128/63, 97.384, 18, 96% blood sugar 162    Physical Exam  Constitutional:       General: He is not in acute distress.  Eyes:      Extraocular Movements: Extraocular movements intact.   Cardiovascular:      Rate and Rhythm: Normal rate and regular rhythm.   Pulmonary:      Effort: Pulmonary effort is normal.      Breath sounds: Normal breath sounds.   Abdominal:      General: Bowel sounds are normal.      Palpations: Abdomen is soft.   Musculoskeletal:      Cervical back: Neck supple.      Right lower leg: No edema.      Left lower leg: No edema.   Neurological:      Mental Status: He is alert.      Motor: Weakness present.   Psychiatric:         Mood and Affect: Mood normal.         Behavior: Behavior is cooperative.         Assessment/Plan  Problem List Items Addressed This Visit       Diabetes mellitus with kidney disease (Multi)     Insulin lispro - sliding scale   Glucoscans  Monitor labs  Avoid nephrotoxins         Hypertension     Amlodipine   Monitor BP    Monitor labs          Weakness     Actively participating in therapy, continue         Other sequelae of other  nontraumatic intracranial hemorrhage - Primary     Requires rehabilitation    Baclofen - muscle spacticity   Left hemiparesis          Medications, treatments, and labs reviewed  Continue medications and treatments as listed in EMR      Scribe Attestation  IMindy Scribe   attest that this documentation has been prepared under the direction and in the presence of Maggie Whipple MD    Provider Attestation - Scribe documentation  All medical record entries made by the Scribe were at my direction and personally dictated by me. I have reviewed the chart and agree that the record accurately reflects my personal performance of the history, physical exam, discussion and plan.   Maggie Whipple MD        Electronically Signed By: Maggie Whipple MD   8/29/24  5:16 PM

## 2024-08-31 NOTE — ASSESSMENT & PLAN NOTE
Continue to work in therapy towards goals  Reports that he is making progress and feeling stronger   Is motivated to regain strength and function   Is motivated to return home.   Is not intrested in returning to acute rehab - feels he is making good progress wit his current therapy

## 2024-08-31 NOTE — ASSESSMENT & PLAN NOTE
Paxil - discontinue per his request  he reported gastric distress   Has supportive family unit   Good coping skills

## 2024-09-01 ENCOUNTER — NURSING HOME VISIT (OUTPATIENT)
Dept: POST ACUTE CARE | Facility: EXTERNAL LOCATION | Age: 74
End: 2024-09-01
Payer: MEDICARE

## 2024-09-01 DIAGNOSIS — I15.9 SECONDARY HYPERTENSION: ICD-10-CM

## 2024-09-01 DIAGNOSIS — R19.5 LOOSE STOOLS: Primary | ICD-10-CM

## 2024-09-01 DIAGNOSIS — R53.1 WEAKNESS: ICD-10-CM

## 2024-09-01 DIAGNOSIS — E11.21 DIABETES MELLITUS WITH KIDNEY DISEASE (MULTI): ICD-10-CM

## 2024-09-01 PROCEDURE — 99308 SBSQ NF CARE LOW MDM 20: CPT | Performed by: INTERNAL MEDICINE

## 2024-09-01 NOTE — LETTER
Patient: Jordon Waddell  : 1950    Encounter Date: 2024    PROGRESS NOTE    Subjective  Chief complaint: Jordon Waddell is a 73 y.o. male who is an acute skilled patient being seen and evaluated for weakness    HPI:  HPI  Patient is currently working in therapy due to generalized weakness.  Therapy is working with patient on performing safe transfers from various surfaces and patient is able to complete with CGA to min assist.  Therapy also working on patient performing therapeutic exercises and strengthening activities to improve functional abilities.  Patient was seen and examined at the bedside and had complaints of loose stools.  Orders placed for discontinuation of Metamucil and to start Questran 1 packet daily.  Patient denies nausea or vomiting.  Afebrile.    Objective  Vital signs: 128/63, 97.2, 84, 18, 98%    Physical Exam  Constitutional:       General: He is not in acute distress.  Eyes:      Extraocular Movements: Extraocular movements intact.   Cardiovascular:      Rate and Rhythm: Normal rate and regular rhythm.   Pulmonary:      Effort: Pulmonary effort is normal.      Breath sounds: Normal breath sounds.   Abdominal:      General: Bowel sounds are normal.      Palpations: Abdomen is soft.   Musculoskeletal:      Cervical back: Neck supple.      Right lower leg: No edema.      Left lower leg: No edema.   Neurological:      Mental Status: He is alert.      Motor: Weakness present.   Psychiatric:         Mood and Affect: Mood normal.         Behavior: Behavior is cooperative.         Assessment/Plan  Problem List Items Addressed This Visit       Diabetes mellitus with kidney disease (Multi)     Insulin lispro - sliding scale   Glucoscans  Monitor labs  Avoid nephrotoxins         Hypertension     Amlodipine   Monitor BP    Monitor labs          Weakness     Continue therapy, working towards goals         Loose stools - Primary     Discontinue Metamucil  Start Questran daily           Medications, treatments, and labs reviewed  Continue medications and treatments as listed in EMR      Scribe Attestation  I, Destin Hylton   attest that this documentation has been prepared under the direction and in the presence of Maggie Whipple MD    Provider Attestation - Scribe documentation  All medical record entries made by the Scribe were at my direction and personally dictated by me. I have reviewed the chart and agree that the record accurately reflects my personal performance of the history, physical exam, discussion and plan.   Maggie Whipple MD        Electronically Signed By: Maggie Whipple MD   9/5/24 10:29 PM

## 2024-09-03 ENCOUNTER — NURSING HOME VISIT (OUTPATIENT)
Dept: POST ACUTE CARE | Facility: EXTERNAL LOCATION | Age: 74
End: 2024-09-03
Payer: MEDICARE

## 2024-09-03 DIAGNOSIS — R53.1 WEAKNESS: Primary | ICD-10-CM

## 2024-09-03 DIAGNOSIS — I69.298 OTHER SEQUELAE OF OTHER NONTRAUMATIC INTRACRANIAL HEMORRHAGE: ICD-10-CM

## 2024-09-03 DIAGNOSIS — I15.9 SECONDARY HYPERTENSION: ICD-10-CM

## 2024-09-03 DIAGNOSIS — F32.9 MAJOR DEPRESSIVE DISORDER, REMISSION STATUS UNSPECIFIED, UNSPECIFIED WHETHER RECURRENT: ICD-10-CM

## 2024-09-03 DIAGNOSIS — E11.21 DIABETES MELLITUS WITH KIDNEY DISEASE (MULTI): ICD-10-CM

## 2024-09-03 PROBLEM — R19.5 LOOSE STOOLS: Status: ACTIVE | Noted: 2024-09-03

## 2024-09-03 PROCEDURE — 99316 NF DSCHRG MGMT 30 MIN+: CPT | Performed by: NURSE PRACTITIONER

## 2024-09-03 NOTE — LETTER
Patient: Jordon Waddell  : 1950    Encounter Date: 2024    PROGRESS NOTE    Subjective  Chief complaint: Jordon Waddell is a 73 y.o. male who is an acute skilled patient being seen and evaluated for weakness    HPI: He reports that he is scheduled for discharge this Friday. He is motivated to return home and stated that he hopes he continues to make progress.   Advised him that he will be discharge with home care and therapy. Stressed with him that he needs to focus on his safety at all times.   Reviewed current orders. Discussed with Dr Whipple - heparin is to be discontinued because of the improvement in his mobility. Will start on plavix.    Informed him that he will need to schedule appointment for follow up care with his PCP within 10-14 days discharge.   Reviewed current blood sugars, will order Freestyle Aleks with reader.  HPI      Objective  Vital signs: 129/76-82-18-98.4     Physical Exam  Vitals and nursing note reviewed.   Constitutional:       General: He is not in acute distress.     Appearance: He is well-groomed.   Eyes:      Extraocular Movements: Extraocular movements intact.   Cardiovascular:      Rate and Rhythm: Normal rate and regular rhythm.   Pulmonary:      Effort: Pulmonary effort is normal.      Breath sounds: Normal breath sounds.      Comments: Lungs clear   Abdominal:      General: Bowel sounds are normal.      Palpations: Abdomen is soft.   Musculoskeletal:      Cervical back: Neck supple.      Right lower leg: No edema.      Left lower leg: No edema.      Comments: Hemiparesis - left upper arm  Left hand with slight dependent edema        Neurological:      Mental Status: He is alert.   Psychiatric:         Mood and Affect: Mood normal.         Behavior: Behavior is cooperative.       ADM/Discharge prognosis  Assessment/Plan  Problem List Items Addressed This Visit       Diabetes mellitus with kidney disease (Multi)     Insulin lispro - sliding scale   Glucoscans  Monitor  labs  Avoid nephrotoxins         Hypertension     Amlodipine   Monitor BP    Monitor labs            Weakness - Primary     Continue to work in therapy towards goals  Reports that he is making progress and feeling stronger   Is motivated to regain strength and function   Is motivated to return home.   Is scheduled for discharge this Friday. SW to schedule HHC with therapy  Stressed safety at all times  To follow up with PCP within 10-14 days  Reviewed medications          Other sequelae of other nontraumatic intracranial hemorrhage      Baclofen - muscle spacticity   Left hemiparesis, wearing sling left arm          Depression     Paxil - discontinue per his request  he reported gastric distress   Has supportive family unit   Good coping skills          Discharge prognosis - fair   Medications, treatments, and labs reviewed  Continue medications and treatments as listed in EMR    MABEL Fernandes  45 m visit       Electronically Signed By: MABEL Fernandes   9/5/24  7:32 PM

## 2024-09-03 NOTE — PROGRESS NOTES
PROGRESS NOTE    Subjective   Chief complaint: Jordon Waddell is a 73 y.o. male who is an acute skilled patient being seen and evaluated for weakness    HPI: He reports that he is scheduled for discharge this Friday. He is motivated to return home and stated that he hopes he continues to make progress.   Advised him that he will be discharge with home care and therapy. Stressed with him that he needs to focus on his safety at all times.   Reviewed current orders. Discussed with Dr Whipple - heparin is to be discontinued because of the improvement in his mobility. Will start on plavix.    Informed him that he will need to schedule appointment for follow up care with his PCP within 10-14 days discharge.   Reviewed current blood sugars, will order Freestyle Aleks with reader.  HPI      Objective   Vital signs: 129/76-82-18-98.4     Physical Exam  Vitals and nursing note reviewed.   Constitutional:       General: He is not in acute distress.     Appearance: He is well-groomed.   Eyes:      Extraocular Movements: Extraocular movements intact.   Cardiovascular:      Rate and Rhythm: Normal rate and regular rhythm.   Pulmonary:      Effort: Pulmonary effort is normal.      Breath sounds: Normal breath sounds.      Comments: Lungs clear   Abdominal:      General: Bowel sounds are normal.      Palpations: Abdomen is soft.   Musculoskeletal:      Cervical back: Neck supple.      Right lower leg: No edema.      Left lower leg: No edema.      Comments: Hemiparesis - left upper arm  Left hand with slight dependent edema        Neurological:      Mental Status: He is alert.   Psychiatric:         Mood and Affect: Mood normal.         Behavior: Behavior is cooperative.       ADM/Discharge prognosis  Assessment/Plan   Problem List Items Addressed This Visit       Diabetes mellitus with kidney disease (Multi)     Insulin lispro - sliding scale   Glucoscans  Monitor labs  Avoid nephrotoxins         Hypertension     Amlodipine   Monitor  BP    Monitor labs            Weakness - Primary     Continue to work in therapy towards goals  Reports that he is making progress and feeling stronger   Is motivated to regain strength and function   Is motivated to return home.   Is scheduled for discharge this Friday. SW to schedule HHC with therapy  Stressed safety at all times  To follow up with PCP within 10-14 days  Reviewed medications          Other sequelae of other nontraumatic intracranial hemorrhage      Baclofen - muscle spacticity   Left hemiparesis, wearing sling left arm          Depression     Paxil - discontinue per his request  he reported gastric distress   Has supportive family unit   Good coping skills          Discharge prognosis - fair   Medications, treatments, and labs reviewed  Continue medications and treatments as listed in EMR    Perla Powell, APRN-CNP  45 m visit

## 2024-09-03 NOTE — PROGRESS NOTES
PROGRESS NOTE    Subjective   Chief complaint: Jordon Waddell is a 73 y.o. male who is an acute skilled patient being seen and evaluated for weakness    HPI:  HPI  Patient is currently working in therapy due to generalized weakness.  Therapy is working with patient on performing safe transfers from various surfaces and patient is able to complete with CGA to min assist.  Therapy also working on patient performing therapeutic exercises and strengthening activities to improve functional abilities.  Patient was seen and examined at the bedside and had complaints of loose stools.  Orders placed for discontinuation of Metamucil and to start Questran 1 packet daily.  Patient denies nausea or vomiting.  Afebrile.    Objective   Vital signs: 128/63, 97.2, 84, 18, 98%    Physical Exam  Constitutional:       General: He is not in acute distress.  Eyes:      Extraocular Movements: Extraocular movements intact.   Cardiovascular:      Rate and Rhythm: Normal rate and regular rhythm.   Pulmonary:      Effort: Pulmonary effort is normal.      Breath sounds: Normal breath sounds.   Abdominal:      General: Bowel sounds are normal.      Palpations: Abdomen is soft.   Musculoskeletal:      Cervical back: Neck supple.      Right lower leg: No edema.      Left lower leg: No edema.   Neurological:      Mental Status: He is alert.      Motor: Weakness present.   Psychiatric:         Mood and Affect: Mood normal.         Behavior: Behavior is cooperative.         Assessment/Plan   Problem List Items Addressed This Visit       Diabetes mellitus with kidney disease (Multi)     Insulin lispro - sliding scale   Glucoscans  Monitor labs  Avoid nephrotoxins         Hypertension     Amlodipine   Monitor BP    Monitor labs          Weakness     Continue therapy, working towards goals         Loose stools - Primary     Discontinue Metamucil  Start Questran daily          Medications, treatments, and labs reviewed  Continue medications and  treatments as listed in EMR      Scribe Attestation  I, Destin Hylton   attest that this documentation has been prepared under the direction and in the presence of Maggie Whipple MD    Provider Attestation - Scribe documentation  All medical record entries made by the Scribe were at my direction and personally dictated by me. I have reviewed the chart and agree that the record accurately reflects my personal performance of the history, physical exam, discussion and plan.   Maggie Whipple MD

## 2024-09-05 ENCOUNTER — NURSING HOME VISIT (OUTPATIENT)
Dept: POST ACUTE CARE | Facility: EXTERNAL LOCATION | Age: 74
End: 2024-09-05
Payer: MEDICARE

## 2024-09-05 DIAGNOSIS — I15.9 SECONDARY HYPERTENSION: ICD-10-CM

## 2024-09-05 DIAGNOSIS — R53.1 WEAKNESS: ICD-10-CM

## 2024-09-05 DIAGNOSIS — E11.21 DIABETES MELLITUS WITH KIDNEY DISEASE (MULTI): ICD-10-CM

## 2024-09-05 DIAGNOSIS — I69.298 OTHER SEQUELAE OF OTHER NONTRAUMATIC INTRACRANIAL HEMORRHAGE: Primary | ICD-10-CM

## 2024-09-05 NOTE — LETTER
Patient: Jordon Waddell  : 1950    Encounter Date: 2024    PROGRESS NOTE    Subjective  Chief complaint: Jordon Waddell is a 73 y.o. male who is an acute skilled patient being seen and evaluated for weakness    HPI:  HPI  Therapy is currently working with patient and patient is working towards established goals.  Patient is making improvements with transfers.  Patient is possibly discharging home tomorrow with home health care.  Patient was heparin and discontinued and changed to Plavix.  Patient was seen and examined at the bedside, appears to be in no acute distress.  Nursing staff voicing no new concerns.  Patient denying chest pain or shortness of breath    Objective  Vital signs: 144/75, 98.1, 62, 18, 97% blood sugar 139    Physical Exam  Constitutional:       General: He is not in acute distress.  Eyes:      Extraocular Movements: Extraocular movements intact.   Cardiovascular:      Rate and Rhythm: Normal rate and regular rhythm.   Pulmonary:      Effort: Pulmonary effort is normal.      Breath sounds: Normal breath sounds.   Abdominal:      General: Bowel sounds are normal.      Palpations: Abdomen is soft.   Musculoskeletal:      Cervical back: Neck supple.      Right lower leg: No edema.      Left lower leg: No edema.   Neurological:      Mental Status: He is alert.      Motor: Weakness present.   Psychiatric:         Mood and Affect: Mood normal.         Behavior: Behavior is cooperative.         Assessment/Plan  Problem List Items Addressed This Visit       Diabetes mellitus with kidney disease (Multi)     Insulin lispro - sliding scale   Glucoscans  Monitor labs  Avoid nephrotoxins         Hypertension     Amlodipine   Monitor BP    Monitor labs          Weakness     Improving with therapy, continue         Other sequelae of other nontraumatic intracranial hemorrhage - Primary     Requires rehabilitation    Baclofen - muscle spacticity   Left hemiparesis          Medications, treatments,  and labs reviewed  Continue medications and treatments as listed in EMR      Scribe Attestation  I, Destin Hylton   attest that this documentation has been prepared under the direction and in the presence of Maggie Whipple MD    Provider Attestation - Scribe documentation  All medical record entries made by the Scribe were at my direction and personally dictated by me. I have reviewed the chart and agree that the record accurately reflects my personal performance of the history, physical exam, discussion and plan.   Maggie Whipple MD        Electronically Signed By: Maggie Whipple MD   9/6/24  8:16 AM

## 2024-09-05 NOTE — PROGRESS NOTES
PROGRESS NOTE    Subjective   Chief complaint: Jordon Waddell is a 73 y.o. male who is an acute skilled patient being seen and evaluated for weakness    HPI:  HPI  Therapy is currently working with patient and patient is working towards established goals.  Patient is making improvements with transfers.  Patient is possibly discharging home tomorrow with home health care.  Patient was heparin and discontinued and changed to Plavix.  Patient was seen and examined at the bedside, appears to be in no acute distress.  Nursing staff voicing no new concerns.  Patient denying chest pain or shortness of breath    Objective   Vital signs: 144/75, 98.1, 62, 18, 97% blood sugar 139    Physical Exam  Constitutional:       General: He is not in acute distress.  Eyes:      Extraocular Movements: Extraocular movements intact.   Cardiovascular:      Rate and Rhythm: Normal rate and regular rhythm.   Pulmonary:      Effort: Pulmonary effort is normal.      Breath sounds: Normal breath sounds.   Abdominal:      General: Bowel sounds are normal.      Palpations: Abdomen is soft.   Musculoskeletal:      Cervical back: Neck supple.      Right lower leg: No edema.      Left lower leg: No edema.   Neurological:      Mental Status: He is alert.      Motor: Weakness present.   Psychiatric:         Mood and Affect: Mood normal.         Behavior: Behavior is cooperative.         Assessment/Plan   Problem List Items Addressed This Visit       Diabetes mellitus with kidney disease (Multi)     Insulin lispro - sliding scale   Glucoscans  Monitor labs  Avoid nephrotoxins         Hypertension     Amlodipine   Monitor BP    Monitor labs          Weakness     Improving with therapy, continue         Other sequelae of other nontraumatic intracranial hemorrhage - Primary     Requires rehabilitation    Baclofen - muscle spacticity   Left hemiparesis          Medications, treatments, and labs reviewed  Continue medications and treatments as listed in  EMR      Scribe Attestation  I, Destin Hylton   attest that this documentation has been prepared under the direction and in the presence of Maggie Whipple MD    Provider Attestation - Scribe documentation  All medical record entries made by the Scribe were at my direction and personally dictated by me. I have reviewed the chart and agree that the record accurately reflects my personal performance of the history, physical exam, discussion and plan.   Maggie Whipple MD

## 2024-09-05 NOTE — ASSESSMENT & PLAN NOTE
Continue to work in therapy towards goals  Reports that he is making progress and feeling stronger   Is motivated to regain strength and function   Is motivated to return home.   Is scheduled for discharge this Friday. SW to schedule HHC with therapy  Stressed safety at all times  To follow up with PCP within 10-14 days  Reviewed medications

## 2024-09-09 ENCOUNTER — PATIENT OUTREACH (OUTPATIENT)
Dept: PRIMARY CARE | Facility: CLINIC | Age: 74
End: 2024-09-09
Payer: MEDICARE

## 2024-09-09 NOTE — PROGRESS NOTES
Discharge Facility:[facility]  Discharge Diagnosis:Intracranial hemorrhage (Multi)     Admission Date:6/26/24  Discharge Date: 7/9/24-SNF    PCP Appointment Date:9/19/24  Specialist Appointment Date: N/A  Hospital Encounter and Summary Linked: Yes  See discharge assessment below for further details  Engagement  Call Start Time: 1348 (9/9/2024  1:48 PM)    Medications  Medications reviewed with patient/caregiver?: Yes (9/9/2024  1:48 PM)  Does the patient have all medications ordered at discharge?: Yes (9/9/2024  1:48 PM)  Prescription Comments: START taking: heparin (porcine) CHANGE how you take: amLODIPine (Norvasc)  STOP taking: aspirin 81 mg EC tablet KRILL OIL ORAL zoster vaccine-recombinant adjuvanted 50 mcg/0.5 mL vaccine (Shingrix) (9/9/2024  1:48 PM)  Medication Comments: see med list (9/9/2024  1:48 PM)    Appointments  Does the patient have a primary care provider?: Yes (9/9/2024  1:48 PM)  Care Management Interventions: Verified appointment date/time/provider (fuv 9/19/24) (9/9/2024  1:48 PM)  Has the patient kept scheduled appointments due by today?: Yes (9/9/2024  1:48 PM)    Self Management  What is the home health agency?: [facility] (9/9/2024  1:48 PM)    Patient Teaching  Does the patient have access to their discharge instructions?: Yes (9/9/2024  1:48 PM)  Care Management Interventions: Reviewed instructions with patient (9/9/2024  1:48 PM)  What is the patient's perception of their health status since discharge?: Improving (9/9/2024  1:48 PM)  Is the patient/caregiver able to teach back the hierarchy of who to call/visit for symptoms/problems? PCP, Specialist, Home Health nurse, Urgent Care, ED, 911: Yes (9/9/2024  1:48 PM)    Wrap Up  Wrap Up Additional Comments: This CM spoke with pt via phone. Pt reports doing well at home since discharge. New meds reviewed. Pt denies CP and SOB. Patient denies any further discharge questions/needs at this time. Emphasized that Follow up is needed  after discharge to review the hospital recommendations, assess your response to your treatment.  Pt aware of my availability for non-emergent concerns. Contact info provided to patient (9/9/2024  1:48 PM)    Aspen Vera LPN

## 2024-09-10 ENCOUNTER — TELEPHONE (OUTPATIENT)
Dept: PRIMARY CARE | Facility: CLINIC | Age: 74
End: 2024-09-10

## 2024-09-10 ENCOUNTER — APPOINTMENT (OUTPATIENT)
Dept: PRIMARY CARE | Facility: CLINIC | Age: 74
End: 2024-09-10
Payer: MEDICARE

## 2024-09-10 NOTE — TELEPHONE ENCOUNTER
Loly from [facility] called Yesterday at 8:50 pm and wanted to let you know that Jordon fell at home , no injuries . His wife found him on the floor, H e missed the hair sitting back down

## 2024-09-19 ENCOUNTER — PATIENT OUTREACH (OUTPATIENT)
Dept: PRIMARY CARE | Facility: CLINIC | Age: 74
End: 2024-09-19

## 2024-09-19 ENCOUNTER — LAB (OUTPATIENT)
Dept: LAB | Facility: LAB | Age: 74
End: 2024-09-19
Payer: MEDICARE

## 2024-09-19 ENCOUNTER — APPOINTMENT (OUTPATIENT)
Dept: PRIMARY CARE | Facility: CLINIC | Age: 74
End: 2024-09-19
Payer: MEDICARE

## 2024-09-19 VITALS
DIASTOLIC BLOOD PRESSURE: 71 MMHG | HEART RATE: 79 BPM | RESPIRATION RATE: 14 BRPM | SYSTOLIC BLOOD PRESSURE: 132 MMHG | OXYGEN SATURATION: 97 % | BODY MASS INDEX: 31.36 KG/M2 | WEIGHT: 200.2 LBS

## 2024-09-19 DIAGNOSIS — E11.21 DIABETES MELLITUS WITH KIDNEY DISEASE (MULTI): ICD-10-CM

## 2024-09-19 DIAGNOSIS — I61.2: ICD-10-CM

## 2024-09-19 DIAGNOSIS — I10 HYPERTENSION, UNSPECIFIED TYPE: ICD-10-CM

## 2024-09-19 DIAGNOSIS — I62.9 INTRACRANIAL HEMORRHAGE (MULTI): ICD-10-CM

## 2024-09-19 DIAGNOSIS — I61.2: Primary | ICD-10-CM

## 2024-09-19 DIAGNOSIS — E78.5 HYPERLIPIDEMIA, UNSPECIFIED HYPERLIPIDEMIA TYPE: ICD-10-CM

## 2024-09-19 DIAGNOSIS — E11.9 TYPE 2 DIABETES MELLITUS WITHOUT COMPLICATION, UNSPECIFIED WHETHER LONG TERM INSULIN USE (MULTI): ICD-10-CM

## 2024-09-19 LAB
ALBUMIN SERPL BCP-MCNC: 4 G/DL (ref 3.4–5)
ALP SERPL-CCNC: 86 U/L (ref 33–136)
ALT SERPL W P-5'-P-CCNC: 15 U/L (ref 10–52)
ANION GAP SERPL CALC-SCNC: 12 MMOL/L (ref 10–20)
AST SERPL W P-5'-P-CCNC: 14 U/L (ref 9–39)
BILIRUB SERPL-MCNC: 0.5 MG/DL (ref 0–1.2)
BUN SERPL-MCNC: 20 MG/DL (ref 6–23)
CALCIUM SERPL-MCNC: 10.2 MG/DL (ref 8.6–10.6)
CHLORIDE SERPL-SCNC: 106 MMOL/L (ref 98–107)
CHOLEST SERPL-MCNC: 111 MG/DL (ref 0–199)
CHOLESTEROL/HDL RATIO: 3.2
CO2 SERPL-SCNC: 32 MMOL/L (ref 21–32)
CREAT SERPL-MCNC: 1.21 MG/DL (ref 0.5–1.3)
EGFRCR SERPLBLD CKD-EPI 2021: 64 ML/MIN/1.73M*2
ERYTHROCYTE [DISTWIDTH] IN BLOOD BY AUTOMATED COUNT: 12.8 % (ref 11.5–14.5)
EST. AVERAGE GLUCOSE BLD GHB EST-MCNC: 114 MG/DL
GLUCOSE SERPL-MCNC: 100 MG/DL (ref 74–99)
HBA1C MFR BLD: 5.6 %
HCT VFR BLD AUTO: 38.1 % (ref 41–52)
HDLC SERPL-MCNC: 34.3 MG/DL
HGB BLD-MCNC: 12.4 G/DL (ref 13.5–17.5)
LDLC SERPL CALC-MCNC: 45 MG/DL
MCH RBC QN AUTO: 30 PG (ref 26–34)
MCHC RBC AUTO-ENTMCNC: 32.5 G/DL (ref 32–36)
MCV RBC AUTO: 92 FL (ref 80–100)
NON HDL CHOLESTEROL: 77 MG/DL (ref 0–149)
NRBC BLD-RTO: 0 /100 WBCS (ref 0–0)
PLATELET # BLD AUTO: 155 X10*3/UL (ref 150–450)
POTASSIUM SERPL-SCNC: 4.4 MMOL/L (ref 3.5–5.3)
PROT SERPL-MCNC: 6.4 G/DL (ref 6.4–8.2)
RBC # BLD AUTO: 4.13 X10*6/UL (ref 4.5–5.9)
SODIUM SERPL-SCNC: 146 MMOL/L (ref 136–145)
TRIGL SERPL-MCNC: 160 MG/DL (ref 0–149)
TSH SERPL-ACNC: 2.89 MIU/L (ref 0.44–3.98)
VLDL: 32 MG/DL (ref 0–40)
WBC # BLD AUTO: 6.1 X10*3/UL (ref 4.4–11.3)

## 2024-09-19 PROCEDURE — 99495 TRANSJ CARE MGMT MOD F2F 14D: CPT | Performed by: FAMILY MEDICINE

## 2024-09-19 PROCEDURE — 3044F HG A1C LEVEL LT 7.0%: CPT | Performed by: FAMILY MEDICINE

## 2024-09-19 PROCEDURE — 80061 LIPID PANEL: CPT

## 2024-09-19 PROCEDURE — 83036 HEMOGLOBIN GLYCOSYLATED A1C: CPT

## 2024-09-19 PROCEDURE — 80053 COMPREHEN METABOLIC PANEL: CPT

## 2024-09-19 PROCEDURE — 1157F ADVNC CARE PLAN IN RCRD: CPT | Performed by: FAMILY MEDICINE

## 2024-09-19 PROCEDURE — 1123F ACP DISCUSS/DSCN MKR DOCD: CPT | Performed by: FAMILY MEDICINE

## 2024-09-19 PROCEDURE — 85027 COMPLETE CBC AUTOMATED: CPT

## 2024-09-19 PROCEDURE — 3075F SYST BP GE 130 - 139MM HG: CPT | Performed by: FAMILY MEDICINE

## 2024-09-19 PROCEDURE — 84443 ASSAY THYROID STIM HORMONE: CPT

## 2024-09-19 PROCEDURE — 1159F MED LIST DOCD IN RCRD: CPT | Performed by: FAMILY MEDICINE

## 2024-09-19 PROCEDURE — 36415 COLL VENOUS BLD VENIPUNCTURE: CPT

## 2024-09-19 PROCEDURE — 3078F DIAST BP <80 MM HG: CPT | Performed by: FAMILY MEDICINE

## 2024-09-19 PROCEDURE — 3048F LDL-C <100 MG/DL: CPT | Performed by: FAMILY MEDICINE

## 2024-09-19 RX ORDER — ROSUVASTATIN CALCIUM 40 MG/1
40 TABLET, COATED ORAL NIGHTLY
Qty: 90 TABLET | Refills: 1 | Status: SHIPPED | OUTPATIENT
Start: 2024-09-19

## 2024-09-19 RX ORDER — SITAGLIPTIN AND METFORMIN HYDROCHLORIDE 1000; 50 MG/1; MG/1
1 TABLET, FILM COATED ORAL 2 TIMES DAILY
Qty: 180 TABLET | Refills: 1 | Status: SHIPPED | OUTPATIENT
Start: 2024-09-19

## 2024-09-19 RX ORDER — BACLOFEN 5 MG/1
5 TABLET ORAL NIGHTLY
Qty: 90 TABLET | Refills: 3 | Status: SHIPPED | OUTPATIENT
Start: 2024-09-19 | End: 2024-09-30 | Stop reason: SDUPTHER

## 2024-09-19 RX ORDER — CLOPIDOGREL BISULFATE 75 MG/1
1 TABLET ORAL
COMMUNITY
Start: 2024-09-06

## 2024-09-19 RX ORDER — AMLODIPINE BESYLATE 10 MG/1
10 TABLET ORAL DAILY
Qty: 90 TABLET | Refills: 1 | Status: SHIPPED | OUTPATIENT
Start: 2024-09-19

## 2024-09-19 RX ORDER — LISINOPRIL AND HYDROCHLOROTHIAZIDE 12.5; 2 MG/1; MG/1
1 TABLET ORAL 2 TIMES DAILY
Qty: 180 TABLET | Refills: 1 | Status: SHIPPED | OUTPATIENT
Start: 2024-09-19

## 2024-09-19 RX ORDER — GLIMEPIRIDE 4 MG/1
4 TABLET ORAL DAILY
Qty: 90 TABLET | Refills: 1 | Status: SHIPPED | OUTPATIENT
Start: 2024-09-19

## 2024-09-19 RX ORDER — BACLOFEN 5 MG/1
5 TABLET ORAL NIGHTLY
COMMUNITY
Start: 2024-09-06 | End: 2024-09-19 | Stop reason: SDUPTHER

## 2024-09-19 NOTE — PROGRESS NOTES
.Call regarding appt. with PCP on 9/19/24 after hospitalization.  At time of outreach call the patient feels as if their condition has improved since last visit.  No questions or concerns at this time.

## 2024-09-19 NOTE — PROGRESS NOTES
"Subjective   Patient ID: Jordon Waddell is a 73 y.o. male who presents for Hospital Follow-up (Stroke 6/26/24 then to  intensive rehab and [facility] 7/9/24- 9/6/24. Currently receiving in home OT and PT, both weekly and nurse comes every 2 weeks.) and face to face (Mobile scooter ).  PCP: Tae Colby MD      presenting today for follow-up after being discharged from the hospital 6 days ago. The main problem requiring admission was ICH STROKE. The discharge summary and/or Transitional Care Management documentation was reviewed. Medication reconciliation was performed as indicated via the \"Britton as Reviewed\" timestamp.      was contacted by Transitional Care Management services two days after discharge. This encounter and supporting documentation was reviewed.    The complexity of medical decision making for this patient's transitional care is high.    Review of Systems    No family history on file.    No data recorded    No follow-ups on mesfin     HPI73 y.o. male who presented to the hospital with R thalamic ICH. They were diagnosed with a intracerebral hemorrhage.  Etiology: Intracranial hemorrhage: Hypertensive hemorrhage   Jordon Waddell is a 73 y.o. right handed male with a PMHx of HTN, HLD, DM, CKD2, who presents with R thalamic ICH, slightly enlarged on first repeat CHT, stable on third repeat CTH. Initial /86. Likely hypertensive in etiology. Admitted for further management. TTE EF 50-55%, no PFO, LA not well seen. Passed MBSS for modified diet. PT/OT recommend IAR. Course complicated by fever of unknown origin(negative infectious work up), completed 48hx of Abx treament. Due to that and pain in the LLE leg, DVT US done and showed left soleal vein LDVT, given recent ICH, vascular medicine consulted and recommended monitoring with twice weekly dvt US for two weeks then monitor clinically (repeat US DVT 7/5 showed new left gastrocnemius vein DVT, discussed with vascular " medicine Dr. Wilson and plan is to continue monitoring with bi weekly US). Per their instruction he should continue on the chemoDVT ppx while in the rehab and continue having SCDs in both legs at all times (unless while ambulating).   patient was on Heparin at [facility]  Started on Plavix by NP wile in [facility] Unclear why.   Current Outpatient Medications on File Prior to Visit   Medication Sig Dispense Refill    ammonium lactate (Lac-Hydrin) 12 % lotion Apply topically 2 times a day. (Patient taking differently: Apply 1 Application topically 2 times a day as needed for dry skin.) 225 g 1    blood-glucose meter (OneTouch Ultra2 Meter) misc 1 each once daily. Chec bg daily 1 each 0    cholecalciferol (Vitamin D-3) 25 MCG (1000 UT) tablet Take 1 tablet (1,000 Units) by mouth once daily.      clopidogrel (Plavix) 75 mg tablet Take 1 tablet (75 mg) by mouth early in the morning..      diphenhydrAMINE (Sominex) 25 mg tablet Take 1 tablet (25 mg) by mouth as needed at bedtime for sleep or allergies.      lancets misc Check bg once a day 100 each 1    meloxicam (Mobic) 15 mg tablet Take 1 tablet (15 mg) by mouth once daily. 90 tablet 1    OneTouch Ultra Test strip 1 strip by subdermal route once daily. 100 strip 1    PSYLLIUM HUSK ORAL Take 1 capsule by mouth once daily.      vit [id number] (Bioflex) 653-83-27-40 mg tablet Take 1 tablet by mouth once daily.       No current facility-administered medications on file prior to visit.        Review of Systems   Constitutional:  Negative for chills and fever.   HENT: Negative.     Respiratory: Negative.     Cardiovascular: Negative.    Gastrointestinal: Negative.  Negative for nausea and vomiting.   Endocrine: Negative.    Genitourinary: Negative.    Musculoskeletal:  Positive for arthralgias and gait problem.   Skin: Negative.  Negative for rash.   Allergic/Immunologic: Negative.    Neurological:  Positive for facial asymmetry, weakness and numbness.   Hematological:  Negative.    Psychiatric/Behavioral: Negative.     All other systems reviewed and are negative.      Objective   /71   Pulse 79   Resp 14   Wt 90.8 kg (200 lb 3.2 oz)   SpO2 97%   BMI 31.36 kg/m²   BSA: 2.07 meters squared  Growth percentiles: Facility age limit for growth %janette is 20 years. Facility age limit for growth %janette is 20 years.   Lab on 09/19/2024   Component Date Value Ref Range Status    Cholesterol 09/19/2024 111  0 - 199 mg/dL Final          Age      Desirable   Borderline High   High     0-19 Y     0 - 169       170 - 199     >/= 200    20-24 Y     0 - 189       190 - 224     >/= 225         >24 Y     0 - 199       200 - 239     >/= 240   **All ranges are based on fasting samples. Specific   therapeutic targets will vary based on patient-specific   cardiac risk.    Pediatric guidelines reference:Pediatrics 2011, 128(S5).Adult guidelines reference: NCEP ATPIII Guidelines,FAMILIA 2001, 258:2486-97    Venipuncture immediately after or during the administration of Metamizole may lead to falsely low results. Testing should be performed immediately prior to Metamizole dosing.    HDL-Cholesterol 09/19/2024 34.3  mg/dL Final      Age       Very Low   Low     Normal    High    0-19 Y    < 35      < 40     40-45     ----  20-24 Y    ----     < 40      >45      ----        >24 Y      ----     < 40     40-60      >60      Cholesterol/HDL Ratio 09/19/2024 3.2   Final      Ref Values  Desirable  < 3.4  High Risk  > 5.0    LDL Calculated 09/19/2024 45  <=99 mg/dL Final                                Near   Borderline      AGE      Desirable  Optimal    High     High     Very High     0-19 Y     0 - 109     ---    110-129   >/= 130     ----    20-24 Y     0 - 119     ---    120-159   >/= 160     ----      >24 Y     0 -  99   100-129  130-159   160-189     >/=190      VLDL 09/19/2024 32  0 - 40 mg/dL Final    Triglycerides 09/19/2024 160 (H)  0 - 149 mg/dL Final       Age         Desirable   Borderline High    High     Very High   0 D-90 D    19 - 174         ----         ----        ----  91 D- 9 Y     0 -  74        75 -  99     >/= 100      ----    10-19 Y     0 -  89        90 - 129     >/= 130      ----    20-24 Y     0 - 114       115 - 149     >/= 150      ----         >24 Y     0 - 149       150 - 199    200- 499    >/= 500    Venipuncture immediately after or during the administration of Metamizole may lead to falsely low results. Testing should be performed immediately prior to Metamizole dosing.    Non HDL Cholesterol 09/19/2024 77  0 - 149 mg/dL Final          Age       Desirable   Borderline High   High     Very High     0-19 Y     0 - 119       120 - 144     >/= 145    >/= 160    20-24 Y     0 - 149       150 - 189     >/= 190      ----         >24 Y    30 mg/dL above LDL Cholesterol goal      WBC 09/19/2024 6.1  4.4 - 11.3 x10*3/uL Final    nRBC 09/19/2024 0.0  0.0 - 0.0 /100 WBCs Final    RBC 09/19/2024 4.13 (L)  4.50 - 5.90 x10*6/uL Final    Hemoglobin 09/19/2024 12.4 (L)  13.5 - 17.5 g/dL Final    Hematocrit 09/19/2024 38.1 (L)  41.0 - 52.0 % Final    MCV 09/19/2024 92  80 - 100 fL Final    MCH 09/19/2024 30.0  26.0 - 34.0 pg Final    MCHC 09/19/2024 32.5  32.0 - 36.0 g/dL Final    RDW 09/19/2024 12.8  11.5 - 14.5 % Final    Platelets 09/19/2024 155  150 - 450 x10*3/uL Final    Thyroid Stimulating Hormone 09/19/2024 2.89  0.44 - 3.98 mIU/L Final    Hemoglobin A1C 09/19/2024 5.6  See comment % Final    Estimated Average Glucose 09/19/2024 114  Not Established mg/dL Final    Glucose 09/19/2024 100 (H)  74 - 99 mg/dL Final    Sodium 09/19/2024 146 (H)  136 - 145 mmol/L Final    Potassium 09/19/2024 4.4  3.5 - 5.3 mmol/L Final    Chloride 09/19/2024 106  98 - 107 mmol/L Final    Bicarbonate 09/19/2024 32  21 - 32 mmol/L Final    Anion Gap 09/19/2024 12  10 - 20 mmol/L Final    Urea Nitrogen 09/19/2024 20  6 - 23 mg/dL Final    Creatinine 09/19/2024 1.21  0.50 - 1.30 mg/dL Final    eGFR 09/19/2024 64  >60  mL/min/1.73m*2 Final    Calculations of estimated GFR are performed using the 2021 CKD-EPI Study Refit equation without the race variable for the IDMS-Traceable creatinine methods.  https://jasn.asnjournals.org/content/early/2021/09/22/ASN.7739053342    Calcium 09/19/2024 10.2  8.6 - 10.6 mg/dL Final    Albumin 09/19/2024 4.0  3.4 - 5.0 g/dL Final    Alkaline Phosphatase 09/19/2024 86  33 - 136 U/L Final    Total Protein 09/19/2024 6.4  6.4 - 8.2 g/dL Final    AST 09/19/2024 14  9 - 39 U/L Final    Bilirubin, Total 09/19/2024 0.5  0.0 - 1.2 mg/dL Final    ALT 09/19/2024 15  10 - 52 U/L Final    Patients treated with Sulfasalazine may generate falsely decreased results for ALT.      Physical Exam  Constitutional:       Appearance: Normal appearance.   Cardiovascular:      Rate and Rhythm: Normal rate and regular rhythm.   Pulmonary:      Effort: Pulmonary effort is normal.      Breath sounds: Normal breath sounds.   Abdominal:      General: Bowel sounds are normal.   Musculoskeletal:         General: Tenderness present.   Neurological:      Mental Status: He is alert and oriented to person, place, and time.      Motor: Weakness present.      Coordination: Coordination abnormal.      Gait: Gait abnormal.      Deep Tendon Reflexes: Reflexes abnormal.   Psychiatric:         Mood and Affect: Mood normal.         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    Relevant Medications    SITagliptin phos-metformin (Janumet) 50-1,000 mg tablet    baclofen (Lioresal) 5 mg tablet    Other Relevant Orders    Lipid Panel  (Completed)    CBC (Completed)    TSH with reflex to Free T4 if abnormal (Completed)    Hemoglobin A1C (Completed)    Comprehensive Metabolic Panel (Completed)    Hypertension I10    Relevant Medications    lisinopriL-hydrochlorothiazide 20-12.5 mg tablet    baclofen (Lioresal) 5 mg tablet    Other Relevant Orders    Lipid Panel (Completed)    CBC (Completed)    TSH with reflex to Free T4 if abnormal (Completed)    Hemoglobin A1C (Completed)    Comprehensive Metabolic Panel (Completed)    Intracranial hemorrhage (Multi) I62.9    Relevant Medications    amLODIPine (Norvasc) 10 mg tablet    baclofen (Lioresal) 5 mg tablet    Other Relevant Orders    Disability Placard    Lipid Panel (Completed)    CBC (Completed)    TSH with reflex to Free T4 if abnormal (Completed)    Hemoglobin A1C (Completed)    Comprehensive Metabolic Panel (Completed)    Nontraumatic hemorrhage of cerebral hemisphere (Multi) - Primary I61.2    Relevant Medications    baclofen (Lioresal) 5 mg tablet    Other Relevant Orders    Disability Placard    Lipid Panel (Completed)    CBC (Completed)    TSH with reflex to Free T4 if abnormal (Completed)    Hemoglobin A1C (Completed)    Comprehensive Metabolic Panel (Completed)    Type 2 diabetes mellitus without complication (Multi) E11.9    Relevant Medications    glimepiride (Amaryl) 4 mg tablet    baclofen (Lioresal) 5 mg tablet    Other Relevant Orders    Lipid Panel (Completed)    CBC (Completed)    TSH with reflex to Free T4 if abnormal (Completed)    Hemoglobin A1C (Completed)    Comprehensive Metabolic Panel (Completed)

## 2024-09-21 ENCOUNTER — PATIENT MESSAGE (OUTPATIENT)
Dept: PRIMARY CARE | Facility: CLINIC | Age: 74
End: 2024-09-21
Payer: MEDICARE

## 2024-09-29 PROBLEM — I61.2: Status: ACTIVE | Noted: 2024-09-29

## 2024-09-29 PROBLEM — E11.9 TYPE 2 DIABETES MELLITUS WITHOUT COMPLICATION (MULTI): Status: ACTIVE | Noted: 2024-09-29

## 2024-09-29 PROBLEM — I62.9 INTRACRANIAL HEMORRHAGE (MULTI): Status: ACTIVE | Noted: 2024-09-29

## 2024-09-29 ASSESSMENT — ENCOUNTER SYMPTOMS
VOMITING: 0
GASTROINTESTINAL NEGATIVE: 1
HEMATOLOGIC/LYMPHATIC NEGATIVE: 1
NUMBNESS: 1
FACIAL ASYMMETRY: 1
PSYCHIATRIC NEGATIVE: 1
ALLERGIC/IMMUNOLOGIC NEGATIVE: 1
CHILLS: 0
RESPIRATORY NEGATIVE: 1
FEVER: 0
CARDIOVASCULAR NEGATIVE: 1
ENDOCRINE NEGATIVE: 1
WEAKNESS: 1
ARTHRALGIAS: 1
NAUSEA: 0

## 2024-09-30 DIAGNOSIS — E11.9 TYPE 2 DIABETES MELLITUS WITHOUT COMPLICATION, UNSPECIFIED WHETHER LONG TERM INSULIN USE (MULTI): ICD-10-CM

## 2024-09-30 DIAGNOSIS — E11.21 DIABETES MELLITUS WITH KIDNEY DISEASE (MULTI): ICD-10-CM

## 2024-09-30 DIAGNOSIS — I10 HYPERTENSION, UNSPECIFIED TYPE: ICD-10-CM

## 2024-09-30 DIAGNOSIS — I61.2: ICD-10-CM

## 2024-09-30 DIAGNOSIS — I62.9 INTRACRANIAL HEMORRHAGE (MULTI): ICD-10-CM

## 2024-09-30 DIAGNOSIS — E78.5 HYPERLIPIDEMIA, UNSPECIFIED HYPERLIPIDEMIA TYPE: ICD-10-CM

## 2024-09-30 RX ORDER — BACLOFEN 5 MG/1
5 TABLET ORAL NIGHTLY
Qty: 90 TABLET | Refills: 3 | Status: SHIPPED | OUTPATIENT
Start: 2024-09-30 | End: 2025-09-30

## 2024-10-08 ENCOUNTER — PATIENT OUTREACH (OUTPATIENT)
Dept: PRIMARY CARE | Facility: CLINIC | Age: 74
End: 2024-10-08
Payer: MEDICARE

## 2024-10-15 ENCOUNTER — TELEPHONE (OUTPATIENT)
Dept: PRIMARY CARE | Facility: CLINIC | Age: 74
End: 2024-10-15
Payer: MEDICARE

## 2024-10-15 NOTE — TELEPHONE ENCOUNTER
Rita from occupational therapy called. She wanted to report that when she came in this morning patient did have a fall on the floor  when she came in and the wife and her  helped him up.

## 2024-10-16 NOTE — TELEPHONE ENCOUNTER
Jose L from [facility] called and wanted to notify you that pt had a fall but no injuries

## 2024-10-22 ENCOUNTER — APPOINTMENT (OUTPATIENT)
Dept: CARDIOLOGY | Facility: CLINIC | Age: 74
End: 2024-10-22
Payer: MEDICARE

## 2024-12-04 ENCOUNTER — PATIENT OUTREACH (OUTPATIENT)
Dept: PRIMARY CARE | Facility: CLINIC | Age: 74
End: 2024-12-04
Payer: MEDICARE

## 2024-12-09 ENCOUNTER — APPOINTMENT (OUTPATIENT)
Dept: NEUROLOGY | Facility: CLINIC | Age: 74
End: 2024-12-09
Payer: MEDICARE

## 2024-12-11 ENCOUNTER — APPOINTMENT (OUTPATIENT)
Dept: PRIMARY CARE | Facility: CLINIC | Age: 74
End: 2024-12-11
Payer: MEDICARE

## 2024-12-16 DIAGNOSIS — E11.9 TYPE 2 DIABETES MELLITUS WITHOUT COMPLICATION, WITHOUT LONG-TERM CURRENT USE OF INSULIN (MULTI): ICD-10-CM

## 2024-12-16 RX ORDER — BLOOD SUGAR DIAGNOSTIC
1 STRIP MISCELLANEOUS DAILY
Qty: 100 STRIP | Refills: 1 | Status: SHIPPED | OUTPATIENT
Start: 2024-12-16

## 2024-12-21 DIAGNOSIS — E78.5 HYPERLIPIDEMIA, UNSPECIFIED HYPERLIPIDEMIA TYPE: ICD-10-CM

## 2024-12-21 DIAGNOSIS — Z00.00 HEALTH CARE MAINTENANCE: ICD-10-CM

## 2024-12-21 DIAGNOSIS — I10 HYPERTENSION, UNSPECIFIED TYPE: ICD-10-CM

## 2024-12-21 DIAGNOSIS — E11.21 DIABETES MELLITUS WITH KIDNEY DISEASE (MULTI): ICD-10-CM

## 2024-12-21 DIAGNOSIS — I61.2: ICD-10-CM

## 2024-12-21 DIAGNOSIS — I62.9 INTRACRANIAL HEMORRHAGE (MULTI): ICD-10-CM

## 2024-12-21 DIAGNOSIS — E11.9 TYPE 2 DIABETES MELLITUS WITHOUT COMPLICATION, UNSPECIFIED WHETHER LONG TERM INSULIN USE (MULTI): ICD-10-CM

## 2024-12-21 RX ORDER — MELOXICAM 15 MG/1
15 TABLET ORAL DAILY
Qty: 90 TABLET | Refills: 1 | Status: SHIPPED | OUTPATIENT
Start: 2024-12-21

## 2024-12-21 RX ORDER — BACLOFEN 5 MG/1
5 TABLET ORAL NIGHTLY
Qty: 90 TABLET | Refills: 1 | Status: SHIPPED | OUTPATIENT
Start: 2024-12-21 | End: 2025-12-21

## 2024-12-30 ENCOUNTER — APPOINTMENT (OUTPATIENT)
Dept: PRIMARY CARE | Facility: CLINIC | Age: 74
End: 2024-12-30
Payer: MEDICARE

## 2025-03-11 ENCOUNTER — APPOINTMENT (OUTPATIENT)
Dept: PRIMARY CARE | Facility: CLINIC | Age: 75
End: 2025-03-11
Payer: MEDICARE

## 2025-03-11 VITALS
WEIGHT: 215 LBS | DIASTOLIC BLOOD PRESSURE: 75 MMHG | SYSTOLIC BLOOD PRESSURE: 119 MMHG | HEART RATE: 85 BPM | BODY MASS INDEX: 33.74 KG/M2 | OXYGEN SATURATION: 95 % | HEIGHT: 67 IN

## 2025-03-11 DIAGNOSIS — I69.352 HEMIPLEGIA AND HEMIPARESIS FOLLOWING CEREBRAL INFARCTION AFFECTING LEFT DOMINANT SIDE (MULTI): ICD-10-CM

## 2025-03-11 DIAGNOSIS — R30.0 DYSURIA: ICD-10-CM

## 2025-03-11 DIAGNOSIS — N18.31 STAGE 3A CHRONIC KIDNEY DISEASE (MULTI): ICD-10-CM

## 2025-03-11 DIAGNOSIS — I62.9 INTRACRANIAL HEMORRHAGE (MULTI): ICD-10-CM

## 2025-03-11 DIAGNOSIS — I61.2: ICD-10-CM

## 2025-03-11 DIAGNOSIS — E66.01 OBESITY, MORBID (MULTI): ICD-10-CM

## 2025-03-11 DIAGNOSIS — E78.5 HYPERLIPIDEMIA, UNSPECIFIED HYPERLIPIDEMIA TYPE: ICD-10-CM

## 2025-03-11 DIAGNOSIS — S06.34AA: ICD-10-CM

## 2025-03-11 DIAGNOSIS — Z00.00 ROUTINE GENERAL MEDICAL EXAMINATION AT HEALTH CARE FACILITY: Primary | ICD-10-CM

## 2025-03-11 DIAGNOSIS — I10 HYPERTENSION, UNSPECIFIED TYPE: ICD-10-CM

## 2025-03-11 DIAGNOSIS — E11.9 TYPE 2 DIABETES MELLITUS WITHOUT COMPLICATION, UNSPECIFIED WHETHER LONG TERM INSULIN USE (MULTI): ICD-10-CM

## 2025-03-11 DIAGNOSIS — R60.0 LOCALIZED EDEMA: ICD-10-CM

## 2025-03-11 DIAGNOSIS — E11.21 DIABETES MELLITUS WITH KIDNEY DISEASE (MULTI): ICD-10-CM

## 2025-03-11 PROBLEM — N18.2 CHRONIC KIDNEY DISEASE, STAGE 2 (MILD): Status: ACTIVE | Noted: 2024-07-25

## 2025-03-11 LAB — POC HEMOGLOBIN A1C: 5.5 % (ref 4.2–6.5)

## 2025-03-11 PROCEDURE — 83036 HEMOGLOBIN GLYCOSYLATED A1C: CPT | Performed by: FAMILY MEDICINE

## 2025-03-11 PROCEDURE — 3078F DIAST BP <80 MM HG: CPT | Performed by: FAMILY MEDICINE

## 2025-03-11 PROCEDURE — 99214 OFFICE O/P EST MOD 30 MIN: CPT | Performed by: FAMILY MEDICINE

## 2025-03-11 PROCEDURE — 3074F SYST BP LT 130 MM HG: CPT | Performed by: FAMILY MEDICINE

## 2025-03-11 PROCEDURE — G0439 PPPS, SUBSEQ VISIT: HCPCS | Performed by: FAMILY MEDICINE

## 2025-03-11 PROCEDURE — 3008F BODY MASS INDEX DOCD: CPT | Performed by: FAMILY MEDICINE

## 2025-03-11 PROCEDURE — 1123F ACP DISCUSS/DSCN MKR DOCD: CPT | Performed by: FAMILY MEDICINE

## 2025-03-11 PROCEDURE — 1170F FXNL STATUS ASSESSED: CPT | Performed by: FAMILY MEDICINE

## 2025-03-11 PROCEDURE — 90656 IIV3 VACC NO PRSV 0.5 ML IM: CPT | Performed by: FAMILY MEDICINE

## 2025-03-11 PROCEDURE — 1159F MED LIST DOCD IN RCRD: CPT | Performed by: FAMILY MEDICINE

## 2025-03-11 PROCEDURE — G0008 ADMIN INFLUENZA VIRUS VAC: HCPCS | Performed by: FAMILY MEDICINE

## 2025-03-11 PROCEDURE — 1157F ADVNC CARE PLAN IN RCRD: CPT | Performed by: FAMILY MEDICINE

## 2025-03-11 PROCEDURE — G0447 BEHAVIOR COUNSEL OBESITY 15M: HCPCS | Performed by: FAMILY MEDICINE

## 2025-03-11 RX ORDER — AMLODIPINE BESYLATE 10 MG/1
10 TABLET ORAL DAILY
Qty: 90 TABLET | Refills: 1 | Status: SHIPPED | OUTPATIENT
Start: 2025-03-11

## 2025-03-11 RX ORDER — LANCETS 33 GAUGE
EACH MISCELLANEOUS
COMMUNITY
Start: 2024-09-30

## 2025-03-11 RX ORDER — METFORMIN HYDROCHLORIDE 1000 MG/1
1000 TABLET ORAL
Qty: 180 TABLET | Refills: 1 | Status: SHIPPED | OUTPATIENT
Start: 2025-03-11 | End: 2025-09-07

## 2025-03-11 RX ORDER — SITAGLIPTIN AND METFORMIN HYDROCHLORIDE 1000; 50 MG/1; MG/1
1 TABLET, FILM COATED ORAL 2 TIMES DAILY
Qty: 60 TABLET | Refills: 5 | Status: CANCELLED | OUTPATIENT
Start: 2025-03-11

## 2025-03-11 RX ORDER — GLIMEPIRIDE 4 MG/1
4 TABLET ORAL DAILY
Qty: 90 TABLET | Refills: 1 | Status: CANCELLED | OUTPATIENT
Start: 2025-03-11

## 2025-03-11 RX ORDER — BACLOFEN 5 MG/1
5 TABLET ORAL NIGHTLY
Qty: 90 TABLET | Refills: 1 | Status: SHIPPED | OUTPATIENT
Start: 2025-03-11 | End: 2026-03-11

## 2025-03-11 RX ORDER — GLIMEPIRIDE 1 MG/1
1 TABLET ORAL
Qty: 180 TABLET | Refills: 1 | Status: SHIPPED | OUTPATIENT
Start: 2025-03-11 | End: 2026-04-15

## 2025-03-11 RX ORDER — LISINOPRIL AND HYDROCHLOROTHIAZIDE 12.5; 2 MG/1; MG/1
1 TABLET ORAL 2 TIMES DAILY
Qty: 180 TABLET | Refills: 1 | Status: SHIPPED | OUTPATIENT
Start: 2025-03-11

## 2025-03-11 RX ORDER — ROSUVASTATIN CALCIUM 40 MG/1
40 TABLET, COATED ORAL NIGHTLY
Qty: 90 TABLET | Refills: 1 | Status: SHIPPED | OUTPATIENT
Start: 2025-03-11

## 2025-03-11 ASSESSMENT — ENCOUNTER SYMPTOMS
OCCASIONAL FEELINGS OF UNSTEADINESS: 1
DEPRESSION: 0
LOSS OF SENSATION IN FEET: 0

## 2025-03-11 ASSESSMENT — ACTIVITIES OF DAILY LIVING (ADL)
DRESSING: NEEDS ASSISTANCE
TAKING_MEDICATION: TOTAL CARE
DOING_HOUSEWORK: TOTAL CARE
MANAGING_FINANCES: TOTAL CARE
GROCERY_SHOPPING: TOTAL CARE
BATHING: NEEDS ASSISTANCE

## 2025-03-11 NOTE — ASSESSMENT & PLAN NOTE
Orders:    POCT glycosylated hemoglobin (Hb A1C) manually resulted    baclofen (Lioresal) 5 mg tablet; Take 1 tablet (5 mg) by mouth once daily at bedtime.    glimepiride (AmaryL) 1 mg tablet; Take 1 tablet (1 mg) by mouth 2 times a day before meals.    metFORMIN (Glucophage) 1,000 mg tablet; Take 1 tablet (1,000 mg) by mouth 2 times daily (morning and late afternoon).    Lipid Panel; Future    CBC; Future    TSH with reflex to Free T4 if abnormal; Future    Comprehensive Metabolic Panel; Future    Albumin-Creatinine Ratio, Urine Random; Future    Urinalysis with Reflex Microscopic; Future    Urine Culture; Future

## 2025-03-11 NOTE — ASSESSMENT & PLAN NOTE
Orders:    Lipid Panel; Future    CBC; Future    TSH with reflex to Free T4 if abnormal; Future    Comprehensive Metabolic Panel; Future    Albumin-Creatinine Ratio, Urine Random; Future    Urinalysis with Reflex Microscopic; Future    Referral to Occupational Therapy; Future    Urine Culture; Future

## 2025-03-11 NOTE — PROGRESS NOTES
"Subjective   Reason for Visit: Jordon Waddell is an 74 y.o. male here for a Medicare Wellness visit.     Pt comes in for wellness exam. Pt has a spot on his head he wants looked at. Also has some feet swelling. Pt does want the refill on his janumet to be a 30 day supply and not 90 day.     Past Medical, Surgical, and Family History reviewed and updated in chart.         HPI  74-year-old male with history of stroke with left-sided weakness has been doing rehab at home strength has improved but continues to have weakness of the left upper and lower extremity  Blood sugars have been low has lost weight A1c today is 5.5 monitoring blood pressure at home on a regular basis  Tolerating statin  Wondering about driving  Complains of swelling in his feet  Blood pressure is controlled  Patient Care Team:  Tae Colby MD as PCP - General  Maggie Whipple MD as PCP - [employer] PCP     Review of Systems   Constitutional:  Positive for activity change and appetite change. Negative for chills and fever.   HENT: Negative.     Respiratory: Negative.     Cardiovascular: Negative.    Gastrointestinal: Negative.  Negative for nausea and vomiting.   Endocrine: Negative.    Genitourinary: Negative.    Musculoskeletal: Negative.    Skin: Negative.  Negative for rash.   Allergic/Immunologic: Negative.    Neurological:  Positive for speech difficulty, weakness and numbness.   Hematological: Negative.    Psychiatric/Behavioral: Negative.     All other systems reviewed and are negative.      Objective   Vitals:  /75   Pulse 85   Ht 1.702 m (5' 7\")   Wt 97.5 kg (215 lb)   SpO2 95%   BMI 33.67 kg/m²       Physical Exam  Constitutional:       Appearance: Normal appearance.   Cardiovascular:      Rate and Rhythm: Normal rate and regular rhythm.   Pulmonary:      Effort: Pulmonary effort is normal.      Breath sounds: Normal breath sounds.   Abdominal:      General: Bowel sounds are normal.   Neurological:      Mental " Status: He is alert and oriented to person, place, and time.      Sensory: Sensory deficit present.      Motor: Weakness present.      Coordination: Coordination abnormal.      Gait: Gait abnormal.      Deep Tendon Reflexes: Reflexes abnormal.      Comments: Decreased strength left upper and lower extremity DTRs diminished 1+ strength 2/5 normal strength on the right side of the right upper and lower extremity   Psychiatric:         Mood and Affect: Mood normal.         Assessment & Plan  Diabetes mellitus with kidney disease (Multi)    Orders:    POCT glycosylated hemoglobin (Hb A1C) manually resulted    baclofen (Lioresal) 5 mg tablet; Take 1 tablet (5 mg) by mouth once daily at bedtime.    glimepiride (AmaryL) 1 mg tablet; Take 1 tablet (1 mg) by mouth 2 times a day before meals.    metFORMIN (Glucophage) 1,000 mg tablet; Take 1 tablet (1,000 mg) by mouth 2 times daily (morning and late afternoon).    Lipid Panel; Future    CBC; Future    TSH with reflex to Free T4 if abnormal; Future    Comprehensive Metabolic Panel; Future    Albumin-Creatinine Ratio, Urine Random; Future    Urinalysis with Reflex Microscopic; Future    Urine Culture; Future    Intracranial hemorrhage (Multi)    Orders:    amLODIPine (Norvasc) 10 mg tablet; Take 1 tablet (10 mg) by mouth once daily.    baclofen (Lioresal) 5 mg tablet; Take 1 tablet (5 mg) by mouth once daily at bedtime.    Lipid Panel; Future    CBC; Future    TSH with reflex to Free T4 if abnormal; Future    Comprehensive Metabolic Panel; Future    Albumin-Creatinine Ratio, Urine Random; Future    Urinalysis with Reflex Microscopic; Future    Urine Culture; Future    Nontraumatic hemorrhage of cerebral hemisphere, unspecified laterality (Multi)    Orders:    baclofen (Lioresal) 5 mg tablet; Take 1 tablet (5 mg) by mouth once daily at bedtime.    Lipid Panel; Future    CBC; Future    TSH with reflex to Free T4 if abnormal; Future    Comprehensive Metabolic Panel; Future     Albumin-Creatinine Ratio, Urine Random; Future    Urinalysis with Reflex Microscopic; Future    Urine Culture; Future    Hyperlipidemia, unspecified hyperlipidemia type    Orders:    baclofen (Lioresal) 5 mg tablet; Take 1 tablet (5 mg) by mouth once daily at bedtime.    rosuvastatin (Crestor) 40 mg tablet; Take 1 tablet (40 mg) by mouth once daily at bedtime.    Lipid Panel; Future    CBC; Future    TSH with reflex to Free T4 if abnormal; Future    Comprehensive Metabolic Panel; Future    Albumin-Creatinine Ratio, Urine Random; Future    Urinalysis with Reflex Microscopic; Future    Urine Culture; Future    Hypertension, unspecified type    Orders:    baclofen (Lioresal) 5 mg tablet; Take 1 tablet (5 mg) by mouth once daily at bedtime.    lisinopriL-hydrochlorothiazide 20-12.5 mg tablet; Take 1 tablet by mouth 2 times a day.    Lipid Panel; Future    CBC; Future    TSH with reflex to Free T4 if abnormal; Future    Comprehensive Metabolic Panel; Future    Albumin-Creatinine Ratio, Urine Random; Future    Urinalysis with Reflex Microscopic; Future    Urine Culture; Future    Type 2 diabetes mellitus without complication, unspecified whether long term insulin use (Multi)    Orders:    baclofen (Lioresal) 5 mg tablet; Take 1 tablet (5 mg) by mouth once daily at bedtime.    Lipid Panel; Future    CBC; Future    TSH with reflex to Free T4 if abnormal; Future    Comprehensive Metabolic Panel; Future    Albumin-Creatinine Ratio, Urine Random; Future    Urinalysis with Reflex Microscopic; Future    Urine Culture; Future    Stage 3a chronic kidney disease (Multi)    Orders:    Lipid Panel; Future    CBC; Future    TSH with reflex to Free T4 if abnormal; Future    Comprehensive Metabolic Panel; Future    Albumin-Creatinine Ratio, Urine Random; Future    Urinalysis with Reflex Microscopic; Future    Urine Culture; Future    Hemiplegia and hemiparesis following cerebral infarction affecting left dominant side  (Multi)    Orders:    Lipid Panel; Future    CBC; Future    TSH with reflex to Free T4 if abnormal; Future    Comprehensive Metabolic Panel; Future    Albumin-Creatinine Ratio, Urine Random; Future    Urinalysis with Reflex Microscopic; Future    Referral to Occupational Therapy; Future    Urine Culture; Future    Dysuria    Orders:    Urine Culture; Future    Routine general medical examination at health care facility         Traumatic hemorrhage of right cerebrum with loss of consciousness status unknown, initial encounter (Multi)  Continue statin is not to start aspirin         Obesity, morbid (Multi)  Trying to lose weight eating healthier blood sugar improved         Localized edema  Edema due to amlodipine patient was advised to limit sodium elevate legs while sitting and use compression socks monitor blood pressure and try to ambulate more         A1c 5.3 will stop Janumet continue metformin decrease glimepiride monitor blood sugar call back if sugars below 70 or above 180  Patient was referred to occupational therapy for drivering evaluation    Obesity Counseling  15 - 20 minutes were spent counseling on diet and exercise interventions to address obesity and weight reduction.

## 2025-03-11 NOTE — ASSESSMENT & PLAN NOTE
Orders:    baclofen (Lioresal) 5 mg tablet; Take 1 tablet (5 mg) by mouth once daily at bedtime.    Lipid Panel; Future    CBC; Future    TSH with reflex to Free T4 if abnormal; Future    Comprehensive Metabolic Panel; Future    Albumin-Creatinine Ratio, Urine Random; Future    Urinalysis with Reflex Microscopic; Future    Urine Culture; Future

## 2025-03-11 NOTE — ASSESSMENT & PLAN NOTE
Orders:    amLODIPine (Norvasc) 10 mg tablet; Take 1 tablet (10 mg) by mouth once daily.    baclofen (Lioresal) 5 mg tablet; Take 1 tablet (5 mg) by mouth once daily at bedtime.    Lipid Panel; Future    CBC; Future    TSH with reflex to Free T4 if abnormal; Future    Comprehensive Metabolic Panel; Future    Albumin-Creatinine Ratio, Urine Random; Future    Urinalysis with Reflex Microscopic; Future    Urine Culture; Future

## 2025-03-11 NOTE — ASSESSMENT & PLAN NOTE
Orders:    baclofen (Lioresal) 5 mg tablet; Take 1 tablet (5 mg) by mouth once daily at bedtime.    lisinopriL-hydrochlorothiazide 20-12.5 mg tablet; Take 1 tablet by mouth 2 times a day.    Lipid Panel; Future    CBC; Future    TSH with reflex to Free T4 if abnormal; Future    Comprehensive Metabolic Panel; Future    Albumin-Creatinine Ratio, Urine Random; Future    Urinalysis with Reflex Microscopic; Future    Urine Culture; Future

## 2025-03-11 NOTE — ASSESSMENT & PLAN NOTE
Orders:    baclofen (Lioresal) 5 mg tablet; Take 1 tablet (5 mg) by mouth once daily at bedtime.    rosuvastatin (Crestor) 40 mg tablet; Take 1 tablet (40 mg) by mouth once daily at bedtime.    Lipid Panel; Future    CBC; Future    TSH with reflex to Free T4 if abnormal; Future    Comprehensive Metabolic Panel; Future    Albumin-Creatinine Ratio, Urine Random; Future    Urinalysis with Reflex Microscopic; Future    Urine Culture; Future

## 2025-03-11 NOTE — ASSESSMENT & PLAN NOTE
Orders:    Lipid Panel; Future    CBC; Future    TSH with reflex to Free T4 if abnormal; Future    Comprehensive Metabolic Panel; Future    Albumin-Creatinine Ratio, Urine Random; Future    Urinalysis with Reflex Microscopic; Future    Urine Culture; Future

## 2025-03-12 LAB
ALBUMIN SERPL-MCNC: 4.6 G/DL (ref 3.6–5.1)
ALBUMIN/CREAT UR: 341 MG/G CREAT
ALP SERPL-CCNC: 74 U/L (ref 35–144)
ALT SERPL-CCNC: 16 U/L (ref 9–46)
ANION GAP SERPL CALCULATED.4IONS-SCNC: 15 MMOL/L (CALC) (ref 7–17)
APPEARANCE UR: CLEAR
AST SERPL-CCNC: 13 U/L (ref 10–35)
BACTERIA #/AREA URNS HPF: ABNORMAL /HPF
BILIRUB SERPL-MCNC: 0.4 MG/DL (ref 0.2–1.2)
BILIRUB UR QL STRIP: NEGATIVE
BUN SERPL-MCNC: 24 MG/DL (ref 7–25)
CALCIUM SERPL-MCNC: 10.4 MG/DL (ref 8.6–10.3)
CHLORIDE SERPL-SCNC: 101 MMOL/L (ref 98–110)
CHOLEST SERPL-MCNC: 124 MG/DL
CHOLEST/HDLC SERPL: 3.2 (CALC)
CO2 SERPL-SCNC: 27 MMOL/L (ref 20–32)
COLOR UR: YELLOW
CREAT SERPL-MCNC: 1.43 MG/DL (ref 0.7–1.28)
CREAT UR-MCNC: 117 MG/DL (ref 20–320)
EGFRCR SERPLBLD CKD-EPI 2021: 52 ML/MIN/1.73M2
ERYTHROCYTE [DISTWIDTH] IN BLOOD BY AUTOMATED COUNT: 12.6 % (ref 11–15)
GLUCOSE SERPL-MCNC: 92 MG/DL (ref 65–99)
GLUCOSE UR QL STRIP: NEGATIVE
HCT VFR BLD AUTO: 40.1 % (ref 38.5–50)
HDLC SERPL-MCNC: 39 MG/DL
HGB BLD-MCNC: 13.3 G/DL (ref 13.2–17.1)
HGB UR QL STRIP: NEGATIVE
HYALINE CASTS #/AREA URNS LPF: ABNORMAL /LPF
KETONES UR QL STRIP: NEGATIVE
LDLC SERPL CALC-MCNC: 60 MG/DL (CALC)
LEUKOCYTE ESTERASE UR QL STRIP: NEGATIVE
MCH RBC QN AUTO: 30.2 PG (ref 27–33)
MCHC RBC AUTO-ENTMCNC: 33.2 G/DL (ref 32–36)
MCV RBC AUTO: 90.9 FL (ref 80–100)
MICROALBUMIN UR-MCNC: 39.9 MG/DL
NITRITE UR QL STRIP: NEGATIVE
NONHDLC SERPL-MCNC: 85 MG/DL (CALC)
PH UR STRIP: 5.5 [PH] (ref 5–8)
PLATELET # BLD AUTO: 147 THOUSAND/UL (ref 140–400)
PMV BLD REES-ECKER: 13.7 FL (ref 7.5–12.5)
POTASSIUM SERPL-SCNC: 4.3 MMOL/L (ref 3.5–5.3)
PROT SERPL-MCNC: 6.9 G/DL (ref 6.1–8.1)
PROT UR QL STRIP: ABNORMAL
RBC # BLD AUTO: 4.41 MILLION/UL (ref 4.2–5.8)
RBC #/AREA URNS HPF: ABNORMAL /HPF
SERVICE CMNT-IMP: ABNORMAL
SODIUM SERPL-SCNC: 143 MMOL/L (ref 135–146)
SP GR UR STRIP: 1.02 (ref 1–1.03)
SQUAMOUS #/AREA URNS HPF: ABNORMAL /HPF
TRIGL SERPL-MCNC: 178 MG/DL
TSH SERPL-ACNC: 3.33 MIU/L (ref 0.4–4.5)
WBC # BLD AUTO: 6.4 THOUSAND/UL (ref 3.8–10.8)
WBC #/AREA URNS HPF: ABNORMAL /HPF

## 2025-03-13 ENCOUNTER — PATIENT MESSAGE (OUTPATIENT)
Dept: PRIMARY CARE | Facility: CLINIC | Age: 75
End: 2025-03-13
Payer: MEDICARE

## 2025-03-13 DIAGNOSIS — E11.9 TYPE 2 DIABETES MELLITUS WITHOUT COMPLICATION, WITHOUT LONG-TERM CURRENT USE OF INSULIN (MULTI): ICD-10-CM

## 2025-03-13 LAB — BACTERIA UR CULT: NORMAL

## 2025-03-13 RX ORDER — BLOOD SUGAR DIAGNOSTIC
1 STRIP MISCELLANEOUS DAILY
Qty: 100 STRIP | Refills: 1 | Status: SHIPPED | OUTPATIENT
Start: 2025-03-13

## 2025-03-16 PROBLEM — S06.34AA: Status: ACTIVE | Noted: 2025-03-16

## 2025-03-16 PROBLEM — R30.0 DYSURIA: Status: ACTIVE | Noted: 2025-03-16

## 2025-03-16 PROBLEM — R60.0 LOCALIZED EDEMA: Status: ACTIVE | Noted: 2025-03-16

## 2025-03-16 ASSESSMENT — ENCOUNTER SYMPTOMS
NUMBNESS: 1
APPETITE CHANGE: 1
HEMATOLOGIC/LYMPHATIC NEGATIVE: 1
PSYCHIATRIC NEGATIVE: 1
WEAKNESS: 1
SPEECH DIFFICULTY: 1
RESPIRATORY NEGATIVE: 1
CHILLS: 0
VOMITING: 0
CARDIOVASCULAR NEGATIVE: 1
GASTROINTESTINAL NEGATIVE: 1
NAUSEA: 0
ACTIVITY CHANGE: 1
ENDOCRINE NEGATIVE: 1
FEVER: 0
MUSCULOSKELETAL NEGATIVE: 1
ALLERGIC/IMMUNOLOGIC NEGATIVE: 1

## 2025-03-16 NOTE — ASSESSMENT & PLAN NOTE
Edema due to amlodipine patient was advised to limit sodium elevate legs while sitting and use compression socks monitor blood pressure and try to ambulate more

## 2025-04-11 ENCOUNTER — APPOINTMENT (OUTPATIENT)
Dept: OCCUPATIONAL THERAPY | Facility: CLINIC | Age: 75
End: 2025-04-11
Payer: MEDICARE

## 2025-04-30 NOTE — CARE PLAN
Problem: General Stroke  Goal: Establish a mutual long term goal with patient by discharge  Outcome: Progressing  Goal: Demonstrate improvement in neurological exam throughout the shift  Outcome: Progressing  Goal: Maintain BP within ordered limits throughout shift  Outcome: Progressing  Goal: Participate in treatment (ie., meds, therapy) throughout shift  Outcome: Progressing  Goal: No symptoms of aspiration throughout shift  Outcome: Progressing  Goal: No symptoms of hemorrhage throughout shift  Outcome: Progressing  Goal: Tolerate enteral feeding throughout shift  Outcome: Progressing  Goal: Decreased nausea/vomiting throughout shift  Outcome: Progressing  Goal: Controlled blood glucose throughout shift  Outcome: Progressing  Goal: Out of bed three times today  Outcome: Progressing     Problem: ICU Stroke  Goal: Maintain ICP within ordered limits throughout shift  Outcome: Progressing  Goal: Tolerate EVD clamping trial throughout shift  Outcome: Progressing  Goal: Tolerate ventilator weaning trial during shift  Outcome: Progressing  Goal: Maintain patent airway throughout shift  Outcome: Progressing  Goal: Achieve/maintain targeted sodium level throughout shift  Outcome: Progressing       The clinical goals for the shift include Pt will not have increase in neurological deficits during shift         RTC- Sep 10/2025 at 5pm

## 2025-05-05 ENCOUNTER — APPOINTMENT (OUTPATIENT)
Dept: OCCUPATIONAL THERAPY | Facility: CLINIC | Age: 75
End: 2025-05-05
Payer: MEDICARE

## 2025-07-14 ENCOUNTER — APPOINTMENT (OUTPATIENT)
Dept: OCCUPATIONAL THERAPY | Facility: CLINIC | Age: 75
End: 2025-07-14
Payer: MEDICARE

## 2025-07-18 ENCOUNTER — APPOINTMENT (OUTPATIENT)
Dept: PRIMARY CARE | Facility: CLINIC | Age: 75
End: 2025-07-18
Payer: MEDICARE

## 2025-07-28 ENCOUNTER — APPOINTMENT (OUTPATIENT)
Dept: PRIMARY CARE | Facility: CLINIC | Age: 75
End: 2025-07-28
Payer: MEDICARE

## 2025-07-28 VITALS — SYSTOLIC BLOOD PRESSURE: 125 MMHG | BODY MASS INDEX: 31.64 KG/M2 | DIASTOLIC BLOOD PRESSURE: 70 MMHG | WEIGHT: 202 LBS

## 2025-07-28 DIAGNOSIS — E78.49 OTHER HYPERLIPIDEMIA: ICD-10-CM

## 2025-07-28 DIAGNOSIS — I15.9 SECONDARY HYPERTENSION: ICD-10-CM

## 2025-07-28 DIAGNOSIS — N18.31 STAGE 3A CHRONIC KIDNEY DISEASE (MULTI): ICD-10-CM

## 2025-07-28 DIAGNOSIS — Z00.00 ROUTINE GENERAL MEDICAL EXAMINATION AT A HEALTH CARE FACILITY: Primary | ICD-10-CM

## 2025-07-28 DIAGNOSIS — I69.352 HEMIPLEGIA AND HEMIPARESIS FOLLOWING CEREBRAL INFARCTION AFFECTING LEFT DOMINANT SIDE (MULTI): ICD-10-CM

## 2025-07-28 DIAGNOSIS — E11.21 DIABETES MELLITUS WITH KIDNEY DISEASE (MULTI): ICD-10-CM

## 2025-07-28 DIAGNOSIS — B37.2 CANDIDAL DERMATITIS: ICD-10-CM

## 2025-07-28 DIAGNOSIS — K59.1 FUNCTIONAL DIARRHEA: ICD-10-CM

## 2025-07-28 LAB — POC HEMOGLOBIN A1C: 5 % (ref 4.2–6.5)

## 2025-07-28 RX ORDER — DIPHENOXYLATE HYDROCHLORIDE AND ATROPINE SULFATE 2.5; .025 MG/1; MG/1
1 TABLET ORAL 2 TIMES DAILY PRN
Qty: 30 TABLET | Refills: 0 | Status: SHIPPED | OUTPATIENT
Start: 2025-07-28 | End: 2025-09-26

## 2025-07-28 RX ORDER — NYSTATIN 100000 [USP'U]/G
1 POWDER TOPICAL 2 TIMES DAILY
Qty: 60 G | Refills: 1 | Status: SHIPPED | OUTPATIENT
Start: 2025-07-28 | End: 2026-07-28

## 2025-07-28 RX ORDER — CHOLESTYRAMINE 4 G/9G
1 POWDER, FOR SUSPENSION ORAL
Qty: 90 PACKET | Refills: 11 | Status: SHIPPED | OUTPATIENT
Start: 2025-07-28 | End: 2026-07-28

## 2025-07-28 ASSESSMENT — ENCOUNTER SYMPTOMS
LOSS OF SENSATION IN FEET: 0
OCCASIONAL FEELINGS OF UNSTEADINESS: 0
DEPRESSION: 0

## 2025-08-02 PROBLEM — B37.2 CANDIDAL DERMATITIS: Status: ACTIVE | Noted: 2025-08-02

## 2025-08-02 ASSESSMENT — ENCOUNTER SYMPTOMS
ENDOCRINE NEGATIVE: 1
NAUSEA: 0
HEMATOLOGIC/LYMPHATIC NEGATIVE: 1
VOMITING: 0
FEVER: 0
APPETITE CHANGE: 1
PSYCHIATRIC NEGATIVE: 1
SPEECH DIFFICULTY: 1
ACTIVITY CHANGE: 1
MUSCULOSKELETAL NEGATIVE: 1
WEAKNESS: 1
ALLERGIC/IMMUNOLOGIC NEGATIVE: 1
CHILLS: 0
NUMBNESS: 1
GASTROINTESTINAL NEGATIVE: 1
RESPIRATORY NEGATIVE: 1
CARDIOVASCULAR NEGATIVE: 1

## 2025-08-02 NOTE — PROGRESS NOTES
Subjective   Reason for Visit: Jordon Waddell is an 74 y.o. male here for folow up and CPE     Pt comes in for Annual exam. o has some feet swelling. Diarrhea since stroke. Has been of and on Slightly better with probiotics  Not doing much exercise  Wife helping with ADLs.   On metformin  On amlodipine  BP Good control   Taking glimipride 1.5 tab once day         HPI  74-year-old male with history of stroke with left-sided weakness has been doing rehab at home strength has improved but continues to have weakness of the left upper and lower extremity  Blood sugars have been low has lost weight A1c today is 5.5 monitoring blood pressure at home on a regular basis  Tolerating statin  Wondering about driving  Complains of swelling in his feet  Blood pressure is controlled  Has had diarrhea 4-5 times a day Now mainly at night.  Patient Care Team:  Tae Colby MD as PCP - General  Maggie Whipple MD as PCP - [employer] PCP     Review of Systems   Constitutional:  Positive for activity change and appetite change. Negative for chills and fever.   HENT: Negative.     Respiratory: Negative.     Cardiovascular: Negative.    Gastrointestinal: Negative.  Negative for nausea and vomiting.   Endocrine: Negative.    Genitourinary: Negative.    Musculoskeletal: Negative.    Skin: Negative.  Negative for rash.   Allergic/Immunologic: Negative.    Neurological:  Positive for speech difficulty, weakness and numbness.   Hematological: Negative.    Psychiatric/Behavioral: Negative.     All other systems reviewed and are negative.      Objective   Vitals:  /70   Wt 91.6 kg (202 lb)   BMI 31.64 kg/m²       Physical Exam  Constitutional:       Appearance: Normal appearance.     Cardiovascular:      Rate and Rhythm: Normal rate and regular rhythm.   Pulmonary:      Effort: Pulmonary effort is normal.      Breath sounds: Normal breath sounds.   Abdominal:      General: Bowel sounds are normal.     Neurological:       Mental Status: He is alert and oriented to person, place, and time.      Sensory: Sensory deficit present.      Motor: Weakness present.      Coordination: Coordination abnormal.      Gait: Gait abnormal.      Deep Tendon Reflexes: Reflexes abnormal.      Comments: Decreased strength left upper and lower extremity DTRs diminished 1+ strength 2/5 normal strength on the right side of the right upper and lower extremity   Psychiatric:         Mood and Affect: Mood normal.         Assessment & Plan  Diabetes mellitus with kidney disease (Multi)    Orders:    POCT glycosylated hemoglobin (Hb A1C) manually resulted    Lipid Panel; Future    CBC; Future    TSH with reflex to Free T4 if abnormal; Future    Comprehensive Metabolic Panel; Future    cholestyramine (Questran) 4 gram packet; Take 1 packet (4 g) by mouth 3 times daily (morning, midday, late afternoon).    diphenoxylate-atropine (Lomotil) 2.5-0.025 mg tablet; Take 1 tablet by mouth 2 times a day as needed for diarrhea.    Stage 3a chronic kidney disease (Multi)    Orders:    Lipid Panel; Future    CBC; Future    TSH with reflex to Free T4 if abnormal; Future    Comprehensive Metabolic Panel; Future    cholestyramine (Questran) 4 gram packet; Take 1 packet (4 g) by mouth 3 times daily (morning, midday, late afternoon).    diphenoxylate-atropine (Lomotil) 2.5-0.025 mg tablet; Take 1 tablet by mouth 2 times a day as needed for diarrhea.    Secondary hypertension    Orders:    Lipid Panel; Future    CBC; Future    TSH with reflex to Free T4 if abnormal; Future    Comprehensive Metabolic Panel; Future    cholestyramine (Questran) 4 gram packet; Take 1 packet (4 g) by mouth 3 times daily (morning, midday, late afternoon).    diphenoxylate-atropine (Lomotil) 2.5-0.025 mg tablet; Take 1 tablet by mouth 2 times a day as needed for diarrhea.    Other hyperlipidemia    Orders:    Lipid Panel; Future    CBC; Future    TSH with reflex to Free T4 if abnormal; Future     Comprehensive Metabolic Panel; Future    cholestyramine (Questran) 4 gram packet; Take 1 packet (4 g) by mouth 3 times daily (morning, midday, late afternoon).    diphenoxylate-atropine (Lomotil) 2.5-0.025 mg tablet; Take 1 tablet by mouth 2 times a day as needed for diarrhea.    Functional diarrhea    Orders:    Lipid Panel; Future    CBC; Future    TSH with reflex to Free T4 if abnormal; Future    Comprehensive Metabolic Panel; Future    cholestyramine (Questran) 4 gram packet; Take 1 packet (4 g) by mouth 3 times daily (morning, midday, late afternoon).    diphenoxylate-atropine (Lomotil) 2.5-0.025 mg tablet; Take 1 tablet by mouth 2 times a day as needed for diarrhea.    Candidal dermatitis    Orders:    nystatin (Mycostatin) 100,000 unit/gram powder; Apply 1 Application topically 2 times a day.    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 Encouraged to do exercises everyday         A1c 5.5 continue metformin decrease glimepiride monitor blood sugar call back if sugars below 70 or above 180  Patient was referred to occupational therapy for drivering evaluation    Obesity Counseling  15 - 20 minutes were spent counseling on diet and exercise interventions to address obesity and weight reduction.

## 2025-08-02 NOTE — ASSESSMENT & PLAN NOTE
Orders:    Lipid Panel; Future    CBC; Future    TSH with reflex to Free T4 if abnormal; Future    Comprehensive Metabolic Panel; Future    cholestyramine (Questran) 4 gram packet; Take 1 packet (4 g) by mouth 3 times daily (morning, midday, late afternoon).    diphenoxylate-atropine (Lomotil) 2.5-0.025 mg tablet; Take 1 tablet by mouth 2 times a day as needed for diarrhea.

## 2025-08-02 NOTE — ASSESSMENT & PLAN NOTE
Orders:    POCT glycosylated hemoglobin (Hb A1C) manually resulted    Lipid Panel; Future    CBC; Future    TSH with reflex to Free T4 if abnormal; Future    Comprehensive Metabolic Panel; Future    cholestyramine (Questran) 4 gram packet; Take 1 packet (4 g) by mouth 3 times daily (morning, midday, late afternoon).    diphenoxylate-atropine (Lomotil) 2.5-0.025 mg tablet; Take 1 tablet by mouth 2 times a day as needed for diarrhea.

## 2025-08-02 NOTE — ASSESSMENT & PLAN NOTE
Orders:    nystatin (Mycostatin) 100,000 unit/gram powder; Apply 1 Application topically 2 times a day.

## 2025-08-06 DIAGNOSIS — N30.00 ACUTE CYSTITIS WITHOUT HEMATURIA: Primary | ICD-10-CM

## 2025-08-14 ENCOUNTER — TELEPHONE (OUTPATIENT)
Dept: PRIMARY CARE | Facility: CLINIC | Age: 75
End: 2025-08-14
Payer: MEDICARE

## 2025-08-14 DIAGNOSIS — N18.31 STAGE 3A CHRONIC KIDNEY DISEASE (MULTI): ICD-10-CM

## 2025-08-14 DIAGNOSIS — I15.9 SECONDARY HYPERTENSION: ICD-10-CM

## 2025-08-14 DIAGNOSIS — E78.49 OTHER HYPERLIPIDEMIA: Primary | ICD-10-CM

## 2025-08-14 DIAGNOSIS — E11.21 DIABETES MELLITUS WITH KIDNEY DISEASE (MULTI): ICD-10-CM

## 2025-08-14 DIAGNOSIS — E11.9 TYPE 2 DIABETES MELLITUS WITHOUT COMPLICATION, WITHOUT LONG-TERM CURRENT USE OF INSULIN: ICD-10-CM

## 2025-08-16 LAB
APPEARANCE UR: CLEAR
BACTERIA #/AREA URNS HPF: ABNORMAL /HPF
BACTERIA UR CULT: NORMAL
BILIRUB UR QL STRIP: NEGATIVE
COLOR UR: YELLOW
GLUCOSE UR QL STRIP: NEGATIVE
HGB UR QL STRIP: NEGATIVE
HYALINE CASTS #/AREA URNS LPF: ABNORMAL /LPF
KETONES UR QL STRIP: NEGATIVE
LEUKOCYTE ESTERASE UR QL STRIP: NEGATIVE
NITRITE UR QL STRIP: NEGATIVE
PH UR STRIP: 5.5 [PH] (ref 5–8)
PROT UR QL STRIP: ABNORMAL
RBC #/AREA URNS HPF: ABNORMAL /HPF
SERVICE CMNT-IMP: ABNORMAL
SP GR UR STRIP: 1.01 (ref 1–1.03)
SQUAMOUS #/AREA URNS HPF: ABNORMAL /HPF
WBC #/AREA URNS HPF: ABNORMAL /HPF

## 2025-08-19 LAB
ALBUMIN SERPL ELPH-MCNC: 4.1 G/DL (ref 3.8–4.8)
ALBUMIN SERPL-MCNC: 4.4 G/DL (ref 3.6–5.1)
ALP SERPL-CCNC: 83 U/L (ref 35–144)
ALPHA1 GLOB SERPL ELPH-MCNC: 0.3 G/DL (ref 0.2–0.3)
ALPHA2 GLOB SERPL ELPH-MCNC: 0.9 G/DL (ref 0.5–0.9)
ALT SERPL-CCNC: 12 U/L (ref 9–46)
ANION GAP SERPL CALCULATED.4IONS-SCNC: 15 MMOL/L (CALC) (ref 7–17)
AST SERPL-CCNC: 12 U/L (ref 10–35)
BETA1 GLOB SERPL ELPH-MCNC: 0.5 G/DL (ref 0.4–0.6)
BETA2 GLOB SERPL ELPH-MCNC: 0.3 G/DL (ref 0.2–0.5)
BILIRUB SERPL-MCNC: 0.5 MG/DL (ref 0.2–1.2)
BUN SERPL-MCNC: 23 MG/DL (ref 7–25)
CALCIUM SERPL-MCNC: 11 MG/DL (ref 8.6–10.3)
CHLORIDE SERPL-SCNC: 108 MMOL/L (ref 98–110)
CHOLEST SERPL-MCNC: 64 MG/DL
CHOLEST/HDLC SERPL: 2.2 (CALC)
CO2 SERPL-SCNC: 23 MMOL/L (ref 20–32)
CREAT SERPL-MCNC: 1.75 MG/DL (ref 0.7–1.28)
EGFRCR SERPLBLD CKD-EPI 2021: 41 ML/MIN/1.73M2
ERYTHROCYTE [DISTWIDTH] IN BLOOD BY AUTOMATED COUNT: 13.1 % (ref 11–15)
GAMMA GLOB SERPL ELPH-MCNC: 0.8 G/DL (ref 0.8–1.7)
GLUCOSE SERPL-MCNC: 75 MG/DL (ref 65–99)
HCT VFR BLD AUTO: 36.1 % (ref 38.5–50)
HDLC SERPL-MCNC: 29 MG/DL
HGB BLD-MCNC: 11.2 G/DL (ref 13.2–17.1)
LDLC SERPL CALC-MCNC: 11 MG/DL (CALC)
MCH RBC QN AUTO: 30.2 PG (ref 27–33)
MCHC RBC AUTO-ENTMCNC: 31 G/DL (ref 32–36)
MCV RBC AUTO: 97.3 FL (ref 80–100)
NONHDLC SERPL-MCNC: 35 MG/DL (CALC)
PLATELET # BLD AUTO: 159 THOUSAND/UL (ref 140–400)
PMV BLD REES-ECKER: 12.2 FL (ref 7.5–12.5)
POTASSIUM SERPL-SCNC: 3.8 MMOL/L (ref 3.5–5.3)
PROT PATTERN SERPL ELPH-IMP: NORMAL
PROT SERPL-MCNC: 6.9 G/DL (ref 6.1–8.1)
PROT SERPL-MCNC: 6.9 G/DL (ref 6.1–8.1)
RBC # BLD AUTO: 3.71 MILLION/UL (ref 4.2–5.8)
SODIUM SERPL-SCNC: 146 MMOL/L (ref 135–146)
TRIGL SERPL-MCNC: 161 MG/DL
TSH SERPL-ACNC: 2.95 MIU/L (ref 0.4–4.5)
WBC # BLD AUTO: 7.8 THOUSAND/UL (ref 3.8–10.8)

## 2025-08-20 DIAGNOSIS — E11.21 DIABETES MELLITUS WITH KIDNEY DISEASE (MULTI): Primary | ICD-10-CM

## 2025-08-26 ENCOUNTER — APPOINTMENT (OUTPATIENT)
Dept: PHARMACY | Facility: HOSPITAL | Age: 75
End: 2025-08-26
Payer: MEDICARE

## 2025-08-26 DIAGNOSIS — E11.21 DIABETES MELLITUS WITH KIDNEY DISEASE (MULTI): ICD-10-CM

## 2025-08-26 RX ORDER — BISMUTH SUBSALICYLATE 262 MG
1 TABLET,CHEWABLE ORAL DAILY
COMMUNITY

## 2025-08-28 ENCOUNTER — TELEPHONE (OUTPATIENT)
Dept: PHARMACY | Facility: HOSPITAL | Age: 75
End: 2025-08-28
Payer: MEDICARE

## 2025-08-28 DIAGNOSIS — E11.21 DIABETES MELLITUS WITH KIDNEY DISEASE (MULTI): Primary | ICD-10-CM

## 2025-08-28 PROCEDURE — RXMED WILLOW AMBULATORY MEDICATION CHARGE

## 2025-08-29 ENCOUNTER — PHARMACY VISIT (OUTPATIENT)
Dept: PHARMACY | Facility: CLINIC | Age: 75
End: 2025-08-29
Payer: MEDICARE

## 2025-09-05 ENCOUNTER — PATIENT MESSAGE (OUTPATIENT)
Dept: PRIMARY CARE | Facility: CLINIC | Age: 75
End: 2025-09-05
Payer: MEDICARE

## 2025-09-05 DIAGNOSIS — E11.9 TYPE 2 DIABETES MELLITUS WITHOUT COMPLICATION, WITHOUT LONG-TERM CURRENT USE OF INSULIN: ICD-10-CM

## 2025-09-05 RX ORDER — BLOOD SUGAR DIAGNOSTIC
1 STRIP MISCELLANEOUS DAILY
Qty: 100 STRIP | Refills: 1 | Status: SHIPPED | OUTPATIENT
Start: 2025-09-05

## 2025-09-11 ENCOUNTER — APPOINTMENT (OUTPATIENT)
Dept: PRIMARY CARE | Facility: CLINIC | Age: 75
End: 2025-09-11
Payer: MEDICARE

## 2025-09-23 ENCOUNTER — APPOINTMENT (OUTPATIENT)
Dept: PHARMACY | Facility: HOSPITAL | Age: 75
End: 2025-09-23
Payer: MEDICARE

## (undated) DEVICE — SUTURE, PROLENE, 3-0, 18 IN, PS1, BLUE

## (undated) DEVICE — SUTURE, ETHIBOND XTRA, 5 V-37, GRN/BR, LF

## (undated) DEVICE — GOWN, SURGICAL, SMARTGOWN, XX-LARGE, STERILE

## (undated) DEVICE — HIGH FLOW TIP FOR INTERPULSE HANDPIECE SET

## (undated) DEVICE — K-WIRE, 2.4MM 9 IN

## (undated) DEVICE — Device

## (undated) DEVICE — SUTURE TAPE, 1.3MM 40IN, BLK/WH, W/TAPER NDL 36.6MM

## (undated) DEVICE — DRAPE, SHEET, 17 X 23 IN

## (undated) DEVICE — BLADE, SAW PFC DUAL CUT 25 X 90

## (undated) DEVICE — SUTURE, VICRYL, 0, 36 IN, CT-1, UNDYED

## (undated) DEVICE — TIP, SUCTION, YANKAUER, FLEXIBLE

## (undated) DEVICE — INTERPULSE HANDPIECE SET W/ 10FT SUCTION TUBING

## (undated) DEVICE — SUTURE, TAPE, 36 IN X 1.3 IN, TAPERED END/ NEEDLE

## (undated) DEVICE — FIBERTAPE, CERCLAGE, BLUE, W/TIGERLINK

## (undated) DEVICE — ADHESIVE, SKIN, LIQUIBAND EXCEED

## (undated) DEVICE — NEEDLE, REVERSE CUTTING, W/NITINOL LOOP, C-13, 1/2 CIRCLE, 36.6MML

## (undated) DEVICE — SUTURE, VICRYL, 3-0, 27 IN, SH

## (undated) DEVICE — RETRIEVER, SUTURE, HEWSON

## (undated) DEVICE — HOOD, STERISHIELD T4 SYSTEM

## (undated) DEVICE — GLOVE, SURGICAL, PROTEXIS PI ORTHO, 8.5, PF, LF

## (undated) DEVICE — DRAPE, INCISE, ANTIMICROBIAL, IOBAN 2, LARGE, 17 X 23 IN, DISPOSABLE, STERILE

## (undated) DEVICE — GLOVE, SURGEON, PREMIERPRO PI, UNDERGLV, SZ-6.5, PF, GRN